# Patient Record
Sex: MALE | Race: WHITE | URBAN - METROPOLITAN AREA
[De-identification: names, ages, dates, MRNs, and addresses within clinical notes are randomized per-mention and may not be internally consistent; named-entity substitution may affect disease eponyms.]

---

## 2017-01-27 ENCOUNTER — INPATIENT (INPATIENT)
Facility: HOSPITAL | Age: 77
LOS: 2 days | Discharge: HOME | End: 2017-01-30
Attending: INTERNAL MEDICINE | Admitting: INTERNAL MEDICINE
Payer: MEDICARE

## 2017-01-27 PROCEDURE — 71010: CPT | Mod: 26

## 2017-01-27 PROCEDURE — 93010 ELECTROCARDIOGRAM REPORT: CPT

## 2017-01-27 PROCEDURE — 99285 EMERGENCY DEPT VISIT HI MDM: CPT | Mod: 25

## 2017-01-28 VITALS — WEIGHT: 224.87 LBS | HEIGHT: 70 IN

## 2017-01-28 LAB
ANION GAP SERPL CALC-SCNC: 6 MMOL/L — SIGNIFICANT CHANGE UP (ref 5–17)
BASOPHILS # BLD AUTO: 0 K/UL — SIGNIFICANT CHANGE UP (ref 0–0.2)
BASOPHILS NFR BLD AUTO: 0.4 % — SIGNIFICANT CHANGE UP (ref 0–2)
BUN SERPL-MCNC: 19 MG/DL — SIGNIFICANT CHANGE UP (ref 7–23)
CALCIUM SERPL-MCNC: 8.3 MG/DL — LOW (ref 8.5–10.1)
CHLORIDE SERPL-SCNC: 107 MMOL/L — SIGNIFICANT CHANGE UP (ref 96–108)
CO2 SERPL-SCNC: 29 MMOL/L — SIGNIFICANT CHANGE UP (ref 22–31)
CREAT SERPL-MCNC: 0.95 MG/DL — SIGNIFICANT CHANGE UP (ref 0.5–1.3)
CULTURE RESULTS: NO GROWTH — SIGNIFICANT CHANGE UP
EOSINOPHIL # BLD AUTO: 0 K/UL — SIGNIFICANT CHANGE UP (ref 0–0.5)
EOSINOPHIL NFR BLD AUTO: 0.5 % — SIGNIFICANT CHANGE UP (ref 0–6)
GLUCOSE SERPL-MCNC: 114 MG/DL — HIGH (ref 70–99)
HCT VFR BLD CALC: 35.8 % — LOW (ref 39–50)
HGB BLD-MCNC: 11.6 G/DL — LOW (ref 13–17)
LYMPHOCYTES # BLD AUTO: 1.6 K/UL — SIGNIFICANT CHANGE UP (ref 1–3.3)
LYMPHOCYTES # BLD AUTO: 16.1 % — SIGNIFICANT CHANGE UP (ref 13–44)
MAGNESIUM SERPL-MCNC: 2.2 MG/DL — SIGNIFICANT CHANGE UP (ref 1.8–2.4)
MCHC RBC-ENTMCNC: 31 PG — SIGNIFICANT CHANGE UP (ref 27–34)
MCHC RBC-ENTMCNC: 32.3 GM/DL — SIGNIFICANT CHANGE UP (ref 32–36)
MCV RBC AUTO: 95.9 FL — SIGNIFICANT CHANGE UP (ref 80–100)
MONOCYTES # BLD AUTO: 0.4 K/UL — SIGNIFICANT CHANGE UP (ref 0–0.9)
MONOCYTES NFR BLD AUTO: 4.5 % — SIGNIFICANT CHANGE UP (ref 2–14)
NEUTROPHILS # BLD AUTO: 7.7 K/UL — HIGH (ref 1.8–7.4)
NEUTROPHILS NFR BLD AUTO: 78.5 % — HIGH (ref 43–77)
PHOSPHATE SERPL-MCNC: 2.1 MG/DL — LOW (ref 2.5–4.5)
PLATELET # BLD AUTO: 177 K/UL — SIGNIFICANT CHANGE UP (ref 150–400)
POTASSIUM SERPL-MCNC: 4.3 MMOL/L — SIGNIFICANT CHANGE UP (ref 3.5–5.3)
POTASSIUM SERPL-SCNC: 4.3 MMOL/L — SIGNIFICANT CHANGE UP (ref 3.5–5.3)
RBC # BLD: 3.74 M/UL — LOW (ref 4.2–5.8)
RBC # FLD: 13.5 % — SIGNIFICANT CHANGE UP (ref 10.3–14.5)
SODIUM SERPL-SCNC: 142 MMOL/L — SIGNIFICANT CHANGE UP (ref 135–145)
SPECIMEN SOURCE: SIGNIFICANT CHANGE UP
WBC # BLD: 9.9 K/UL — SIGNIFICANT CHANGE UP (ref 3.8–10.5)
WBC # FLD AUTO: 9.9 K/UL — SIGNIFICANT CHANGE UP (ref 3.8–10.5)

## 2017-01-28 RX ORDER — TIOTROPIUM BROMIDE 18 UG/1
1 CAPSULE ORAL; RESPIRATORY (INHALATION) DAILY
Qty: 0 | Refills: 0 | Status: DISCONTINUED | OUTPATIENT
Start: 2017-01-28 | End: 2017-01-28

## 2017-01-28 RX ORDER — IPRATROPIUM/ALBUTEROL SULFATE 18-103MCG
3 AEROSOL WITH ADAPTER (GRAM) INHALATION EVERY 6 HOURS
Qty: 0 | Refills: 0 | Status: DISCONTINUED | OUTPATIENT
Start: 2017-01-28 | End: 2017-01-30

## 2017-01-28 RX ORDER — INFLUENZA VIRUS VACCINE 15; 15; 15; 15 UG/.5ML; UG/.5ML; UG/.5ML; UG/.5ML
0.5 SUSPENSION INTRAMUSCULAR ONCE
Qty: 0 | Refills: 0 | Status: DISCONTINUED | OUTPATIENT
Start: 2017-01-28 | End: 2017-01-30

## 2017-01-28 RX ORDER — CEFTRIAXONE 500 MG/1
1 INJECTION, POWDER, FOR SOLUTION INTRAMUSCULAR; INTRAVENOUS EVERY 24 HOURS
Qty: 0 | Refills: 0 | Status: DISCONTINUED | OUTPATIENT
Start: 2017-01-28 | End: 2017-01-28

## 2017-01-28 RX ORDER — ALBUTEROL 90 UG/1
1 AEROSOL, METERED ORAL EVERY 4 HOURS
Qty: 0 | Refills: 0 | Status: DISCONTINUED | OUTPATIENT
Start: 2017-01-28 | End: 2017-01-28

## 2017-01-28 RX ORDER — ACETAMINOPHEN 500 MG
650 TABLET ORAL EVERY 6 HOURS
Qty: 0 | Refills: 0 | Status: DISCONTINUED | OUTPATIENT
Start: 2017-01-28 | End: 2017-01-30

## 2017-01-28 RX ORDER — SODIUM CHLORIDE 9 MG/ML
1000 INJECTION INTRAMUSCULAR; INTRAVENOUS; SUBCUTANEOUS
Qty: 0 | Refills: 0 | Status: DISCONTINUED | OUTPATIENT
Start: 2017-01-28 | End: 2017-01-30

## 2017-01-28 RX ORDER — DOCUSATE SODIUM 100 MG
200 CAPSULE ORAL AT BEDTIME
Qty: 0 | Refills: 0 | Status: DISCONTINUED | OUTPATIENT
Start: 2017-01-28 | End: 2017-01-30

## 2017-01-28 RX ORDER — AZITHROMYCIN 500 MG/1
250 TABLET, FILM COATED ORAL DAILY
Qty: 0 | Refills: 0 | Status: DISCONTINUED | OUTPATIENT
Start: 2017-01-29 | End: 2017-01-30

## 2017-01-28 RX ORDER — ATORVASTATIN CALCIUM 80 MG/1
20 TABLET, FILM COATED ORAL AT BEDTIME
Qty: 0 | Refills: 0 | Status: DISCONTINUED | OUTPATIENT
Start: 2017-01-28 | End: 2017-01-28

## 2017-01-28 RX ORDER — AZITHROMYCIN 500 MG/1
250 TABLET, FILM COATED ORAL EVERY 24 HOURS
Qty: 0 | Refills: 0 | Status: DISCONTINUED | OUTPATIENT
Start: 2017-01-28 | End: 2017-01-28

## 2017-01-28 RX ORDER — ATORVASTATIN CALCIUM 80 MG/1
20 TABLET, FILM COATED ORAL AT BEDTIME
Qty: 0 | Refills: 0 | Status: DISCONTINUED | OUTPATIENT
Start: 2017-01-28 | End: 2017-01-30

## 2017-01-28 RX ORDER — CEFTRIAXONE 500 MG/1
1 INJECTION, POWDER, FOR SOLUTION INTRAMUSCULAR; INTRAVENOUS EVERY 24 HOURS
Qty: 0 | Refills: 0 | Status: DISCONTINUED | OUTPATIENT
Start: 2017-01-29 | End: 2017-01-30

## 2017-01-28 RX ORDER — ZOLPIDEM TARTRATE 10 MG/1
5 TABLET ORAL AT BEDTIME
Qty: 0 | Refills: 0 | Status: DISCONTINUED | OUTPATIENT
Start: 2017-01-28 | End: 2017-01-30

## 2017-01-28 RX ORDER — HEPARIN SODIUM 5000 [USP'U]/ML
5000 INJECTION INTRAVENOUS; SUBCUTANEOUS EVERY 12 HOURS
Qty: 0 | Refills: 0 | Status: DISCONTINUED | OUTPATIENT
Start: 2017-01-28 | End: 2017-01-30

## 2017-01-28 RX ORDER — AMLODIPINE BESYLATE 2.5 MG/1
5 TABLET ORAL DAILY
Qty: 0 | Refills: 0 | Status: DISCONTINUED | OUTPATIENT
Start: 2017-01-28 | End: 2017-01-28

## 2017-01-28 RX ORDER — LOSARTAN POTASSIUM 100 MG/1
100 TABLET, FILM COATED ORAL DAILY
Qty: 0 | Refills: 0 | Status: DISCONTINUED | OUTPATIENT
Start: 2017-01-28 | End: 2017-01-28

## 2017-01-28 RX ORDER — IPRATROPIUM/ALBUTEROL SULFATE 18-103MCG
3 AEROSOL WITH ADAPTER (GRAM) INHALATION EVERY 6 HOURS
Qty: 0 | Refills: 0 | Status: DISCONTINUED | OUTPATIENT
Start: 2017-01-28 | End: 2017-01-28

## 2017-01-28 RX ORDER — CEFTRIAXONE 500 MG/1
1 INJECTION, POWDER, FOR SOLUTION INTRAMUSCULAR; INTRAVENOUS EVERY 12 HOURS
Qty: 0 | Refills: 0 | Status: DISCONTINUED | OUTPATIENT
Start: 2017-01-28 | End: 2017-01-28

## 2017-01-28 RX ORDER — METFORMIN HYDROCHLORIDE 850 MG/1
1000 TABLET ORAL AT BEDTIME
Qty: 0 | Refills: 0 | Status: DISCONTINUED | OUTPATIENT
Start: 2017-01-28 | End: 2017-01-28

## 2017-01-28 RX ORDER — SENNA PLUS 8.6 MG/1
2 TABLET ORAL AT BEDTIME
Qty: 0 | Refills: 0 | Status: DISCONTINUED | OUTPATIENT
Start: 2017-01-28 | End: 2017-01-30

## 2017-01-28 RX ADMIN — SODIUM CHLORIDE 125 MILLILITER(S): 9 INJECTION INTRAMUSCULAR; INTRAVENOUS; SUBCUTANEOUS at 15:33

## 2017-01-28 RX ADMIN — ZOLPIDEM TARTRATE 5 MILLIGRAM(S): 10 TABLET ORAL at 21:02

## 2017-01-28 RX ADMIN — AZITHROMYCIN 250 MILLIGRAM(S): 500 TABLET, FILM COATED ORAL at 15:28

## 2017-01-28 RX ADMIN — HEPARIN SODIUM 5000 UNIT(S): 5000 INJECTION INTRAVENOUS; SUBCUTANEOUS at 17:56

## 2017-01-28 RX ADMIN — Medication 600 MILLIGRAM(S): at 17:56

## 2017-01-28 RX ADMIN — CEFTRIAXONE 100 GRAM(S): 500 INJECTION, POWDER, FOR SOLUTION INTRAMUSCULAR; INTRAVENOUS at 15:29

## 2017-01-28 RX ADMIN — ATORVASTATIN CALCIUM 20 MILLIGRAM(S): 80 TABLET, FILM COATED ORAL at 21:02

## 2017-01-28 NOTE — PROGRESS NOTE ADULT - SUBJECTIVE AND OBJECTIVE BOX
PCP    Patient is a 76y old  Male who presents with a chief complaint of sob and cough.    HPI: 75 y/o male with HTN, NIDDM II that presents to the ED with a 4 day history of progressively worsening  shortness of breath and productive cough. Patient state that his wife has been sick at home with similar symptoms. He  states that these symptoms have been associated with fevers, night sweats, and chills. Patient states that his shortness  of breath worsened to the point on being unable to ambulate without cough and SOB        Review of system- Rest of the review of system are normal excpet mentioned in HPI    SUBJECTIVE & OBJECTIVE:  No new complaint. Cough and SOB improving. Discussed with patient in length regarding management and d/c plan. Patient is very anxious to go home.         MEDICATIONS  (STANDING):  amLODIPine   Tablet 5milliGRAM(s) Oral daily  hydrochlorothiazide    Capsule 12.5milliGRAM(s) Oral daily  losartan 100milliGRAM(s) Oral daily  sodium chloride 0.9%. 1000milliLiter(s) IV Continuous <Continuous>  metFORMIN 1000milliGRAM(s) Oral at bedtime  atorvastatin 20milliGRAM(s) Oral at bedtime  azithromycin   Tablet 250milliGRAM(s) Oral every 24 hours  cefTRIAXone   IVPB 1Gram(s) IV Intermittent every 24 hours  guaiFENesin ER 600milliGRAM(s) Oral two times a day  heparin  Injectable 5000Unit(s) SubCutaneous every 12 hours  influenza   Vaccine 0.5milliLiter(s) IntraMuscular once    MEDICATIONS  (PRN):  acetaminophen   Tablet. 650milliGRAM(s) Oral every 6 hours PRN Mild Pain (1 - 3)  ALBUTerol/ipratropium for Nebulization 3milliLiter(s) Nebulizer every 6 hours PRN Wheezing  aluminum hydroxide/magnesium hydroxide/simethicone Suspension 30milliLiter(s) Oral every 6 hours PRN Dyspepsia  docusate sodium 200milliGRAM(s) Oral at bedtime PRN Constipation  senna 2Tablet(s) Oral at bedtime PRN Constipation  zolpidem 5milliGRAM(s) Oral at bedtime PRN Insomnia    Height (cm): 177.8 (01-28 @ 10:59)  Weight (kg): 102 (01-28 @ 10:59)  BMI (kg/m2): 32.3 (01-28 @ 10:59)  BSA (m2): 2.19 (01-28 @ 10:59)  LABS:                        11.6   9.9   )-----------( 177      ( 28 Jan 2017 06:05 )             35.8     Vitals: as per nursing documentation:  T(C): --  HR: --  BP: --  RR: --  SpO2: --  Wt(kg): --    Physical exam  GENERAL: NAD, well-groomed, well-developed  HEAD:  Atraumatic, Normocephalic  EYES: EOMI, PERRLA, conjunctiva and sclera clear  ENMT: Moist mucous membranes  NECK: Supple, No JVD  NERVOUS SYSTEM:  Alert & Oriented X3, Motor Strength 5/5 B/L upper and lower extremities; DTRs 2+ intact and symmetric  CHEST/LUNG: Clear to auscultation bilaterally; No rales, rhonchi, wheezing, or rubs  HEART: Regular rate and rhythm; No murmurs, rubs, or gallops  ABDOMEN: Soft, Nontender, Nondistended; Bowel sounds present  EXTREMITIES:  2+ Peripheral Pulses, No clubbing, cyanosis, or edema  Skin  CNS- alert, oriented X3, non focal     28 Jan 2017 06:05    142    |  107    |  19     ----------------------------<  114    4.3     |  29     |  0.95     Ca    8.3        28 Jan 2017 06:05  Phos  2.1       28 Jan 2017 06:05  Mg     2.2       28 Jan 2017 06:05      CAPILLARY BLOOD GLUCOSE      CAPILLARY BLOOD GLUCOSE    CAPILLARY BLOOD GLUCOSE            RECENT CULTURES:      RADIOLOGY & ADDITIONAL TESTS:            amLODIPine   Tablet 5milliGRAM(s) Oral daily  hydrochlorothiazide    Capsule 12.5milliGRAM(s) Oral daily  losartan 100milliGRAM(s) Oral daily  sodium chloride 0.9%. 1000milliLiter(s) IV Continuous <Continuous>  metFORMIN 1000milliGRAM(s) Oral at bedtime  atorvastatin 20milliGRAM(s) Oral at bedtime  azithromycin   Tablet 250milliGRAM(s) Oral every 24 hours  cefTRIAXone   IVPB 1Gram(s) IV Intermittent every 24 hours  guaiFENesin ER 600milliGRAM(s) Oral two times a day  heparin  Injectable 5000Unit(s) SubCutaneous every 12 hours  acetaminophen   Tablet. 650milliGRAM(s) Oral every 6 hours PRN  ALBUTerol/ipratropium for Nebulization 3milliLiter(s) Nebulizer every 6 hours PRN  aluminum hydroxide/magnesium hydroxide/simethicone Suspension 30milliLiter(s) Oral every 6 hours PRN  docusate sodium 200milliGRAM(s) Oral at bedtime PRN  senna 2Tablet(s) Oral at bedtime PRN  zolpidem 5milliGRAM(s) Oral at bedtime PRN  influenza   Vaccine 0.5milliLiter(s) IntraMuscular once    Assessment and Plan: :: 75 y/o male with HTN, NIDDM that presents with SOB and fever concerning for  Community acquired pneumonia    #Sepsis secondary to Viral Pneumomia (Human Metapnuemo Virus) with likley superimposed Community Acquired  Bacterial Left lobar Pnuemonia  -cbc wnl, will trend  - bmp wnl  -Continue IV Rocephin/Zithromax  - Mucinex for cough- blood culultres x 2, urine legiionella, mycoplasma ab    #NIDDM  - hold all oral hypoglycemics  - HISS  - Accucheck Q AC and HS    #HTN  - hold antihypertensives until sepsis resolves.

## 2017-01-29 LAB — LEGIONELLA AG UR QL: NEGATIVE — SIGNIFICANT CHANGE UP

## 2017-01-29 RX ORDER — ALBUTEROL 90 UG/1
1 AEROSOL, METERED ORAL EVERY 4 HOURS
Qty: 0 | Refills: 0 | Status: DISCONTINUED | OUTPATIENT
Start: 2017-01-29 | End: 2017-01-30

## 2017-01-29 RX ORDER — IPRATROPIUM/ALBUTEROL SULFATE 18-103MCG
3 AEROSOL WITH ADAPTER (GRAM) INHALATION EVERY 6 HOURS
Qty: 0 | Refills: 0 | Status: DISCONTINUED | OUTPATIENT
Start: 2017-01-29 | End: 2017-01-30

## 2017-01-29 RX ORDER — AMLODIPINE BESYLATE 2.5 MG/1
5 TABLET ORAL DAILY
Qty: 0 | Refills: 0 | Status: DISCONTINUED | OUTPATIENT
Start: 2017-01-29 | End: 2017-01-30

## 2017-01-29 RX ORDER — LOSARTAN POTASSIUM 100 MG/1
100 TABLET, FILM COATED ORAL DAILY
Qty: 0 | Refills: 0 | Status: DISCONTINUED | OUTPATIENT
Start: 2017-01-29 | End: 2017-01-30

## 2017-01-29 RX ORDER — TIOTROPIUM BROMIDE 18 UG/1
1 CAPSULE ORAL; RESPIRATORY (INHALATION) DAILY
Qty: 0 | Refills: 0 | Status: DISCONTINUED | OUTPATIENT
Start: 2017-01-29 | End: 2017-01-30

## 2017-01-29 RX ADMIN — Medication 3 MILLILITER(S): at 13:28

## 2017-01-29 RX ADMIN — Medication 600 MILLIGRAM(S): at 18:47

## 2017-01-29 RX ADMIN — AMLODIPINE BESYLATE 5 MILLIGRAM(S): 2.5 TABLET ORAL at 18:46

## 2017-01-29 RX ADMIN — HEPARIN SODIUM 5000 UNIT(S): 5000 INJECTION INTRAVENOUS; SUBCUTANEOUS at 06:45

## 2017-01-29 RX ADMIN — ATORVASTATIN CALCIUM 20 MILLIGRAM(S): 80 TABLET, FILM COATED ORAL at 21:47

## 2017-01-29 RX ADMIN — LOSARTAN POTASSIUM 100 MILLIGRAM(S): 100 TABLET, FILM COATED ORAL at 14:21

## 2017-01-29 RX ADMIN — Medication 200 MILLIGRAM(S): at 21:51

## 2017-01-29 RX ADMIN — ZOLPIDEM TARTRATE 5 MILLIGRAM(S): 10 TABLET ORAL at 21:47

## 2017-01-29 RX ADMIN — Medication 200 MILLIGRAM(S): at 22:41

## 2017-01-29 RX ADMIN — Medication 600 MILLIGRAM(S): at 06:45

## 2017-01-29 RX ADMIN — CEFTRIAXONE 100 GRAM(S): 500 INJECTION, POWDER, FOR SOLUTION INTRAMUSCULAR; INTRAVENOUS at 14:21

## 2017-01-29 RX ADMIN — AZITHROMYCIN 250 MILLIGRAM(S): 500 TABLET, FILM COATED ORAL at 11:46

## 2017-01-29 RX ADMIN — HEPARIN SODIUM 5000 UNIT(S): 5000 INJECTION INTRAVENOUS; SUBCUTANEOUS at 18:48

## 2017-01-29 NOTE — PROGRESS NOTE ADULT - SUBJECTIVE AND OBJECTIVE BOX
PCP    Patient is a 76y old  Male who presents with a chief complaint of sob and cough.    HPI: 75 y/o male with HTN, NIDDM II that presents to the ED with a 4 day history of progressively worsening  shortness of breath and productive cough. Patient state that his wife has been sick at home with similar symptoms. He  states that these symptoms have been associated with fevers, night sweats, and chills. Patient states that his shortness  of breath worsened to the point on being unable to ambulate without cough and SOB        Review of system- Rest of the review of system are normal excpet mentioned in HPI    SUBJECTIVE & OBJECTIVE:  No new complaint. Still with cough. Anxious to go home. Patient was advised not to be discharged until clinically stable.         MEDICATIONS  (STANDING):  amLODIPine   Tablet 5milliGRAM(s) Oral daily  hydrochlorothiazide    Capsule 12.5milliGRAM(s) Oral daily  losartan 100milliGRAM(s) Oral daily  sodium chloride 0.9%. 1000milliLiter(s) IV Continuous <Continuous>  metFORMIN 1000milliGRAM(s) Oral at bedtime  atorvastatin 20milliGRAM(s) Oral at bedtime  azithromycin   Tablet 250milliGRAM(s) Oral every 24 hours  cefTRIAXone   IVPB 1Gram(s) IV Intermittent every 24 hours  guaiFENesin ER 600milliGRAM(s) Oral two times a day  heparin  Injectable 5000Unit(s) SubCutaneous every 12 hours  influenza   Vaccine 0.5milliLiter(s) IntraMuscular once    MEDICATIONS  (PRN):  acetaminophen   Tablet. 650milliGRAM(s) Oral every 6 hours PRN Mild Pain (1 - 3)  ALBUTerol/ipratropium for Nebulization 3milliLiter(s) Nebulizer every 6 hours PRN Wheezing  aluminum hydroxide/magnesium hydroxide/simethicone Suspension 30milliLiter(s) Oral every 6 hours PRN Dyspepsia  docusate sodium 200milliGRAM(s) Oral at bedtime PRN Constipation  senna 2Tablet(s) Oral at bedtime PRN Constipation  zolpidem 5milliGRAM(s) Oral at bedtime PRN Insomnia    Height (cm): 177.8 (01-28 @ 10:59)  Weight (kg): 102 (01-28 @ 10:59)  BMI (kg/m2): 32.3 (01-28 @ 10:59)  BSA (m2): 2.19 (01-28 @ 10:59)  LABS:                        11.6   9.9   )-----------( 177      ( 28 Jan 2017 06:05 )             35.8       Physical exam  GENERAL: NAD, well-groomed, well-developed  HEAD:  Atraumatic, Normocephalic  EYES: EOMI, PERRLA, conjunctiva and sclera clear  ENMT: Moist mucous membranes  NECK: Supple, No JVD  NERVOUS SYSTEM:  Alert & Oriented X3, Motor Strength 5/5 B/L upper and lower extremities; DTRs 2+ intact and symmetric  CHEST/LUNG: Bilateral rhonchi and rales  HEART: Regular rate and rhythm; No murmurs, rubs, or gallops  ABDOMEN: Soft, Nontender, Nondistended; Bowel sounds present  EXTREMITIES:  2+ Peripheral Pulses, No clubbing, cyanosis, or edema  Skin  CNS- alert, oriented X3, non focal     28 Jan 2017 06:05    142    |  107    |  19     ----------------------------<  114    4.3     |  29     |  0.95     Ca    8.3        28 Jan 2017 06:05  Phos  2.1       28 Jan 2017 06:05  Mg     2.2       28 Jan 2017 06:05      Meds:    amLODIPine   Tablet 5milliGRAM(s) Oral daily  hydrochlorothiazide    Capsule 12.5milliGRAM(s) Oral daily  losartan 100milliGRAM(s) Oral daily  sodium chloride 0.9%. 1000milliLiter(s) IV Continuous <Continuous>  metFORMIN 1000milliGRAM(s) Oral at bedtime  atorvastatin 20milliGRAM(s) Oral at bedtime  azithromycin   Tablet 250milliGRAM(s) Oral every 24 hours  cefTRIAXone   IVPB 1Gram(s) IV Intermittent every 24 hours  guaiFENesin ER 600milliGRAM(s) Oral two times a day  heparin  Injectable 5000Unit(s) SubCutaneous every 12 hours  acetaminophen   Tablet. 650milliGRAM(s) Oral every 6 hours PRN  ALBUTerol/ipratropium for Nebulization 3milliLiter(s) Nebulizer every 6 hours PRN  aluminum hydroxide/magnesium hydroxide/simethicone Suspension 30milliLiter(s) Oral every 6 hours PRN  docusate sodium 200milliGRAM(s) Oral at bedtime PRN  senna 2Tablet(s) Oral at bedtime PRN  zolpidem 5milliGRAM(s) Oral at bedtime PRN  influenza   Vaccine 0.5milliLiter(s) IntraMuscular once    Assessment and Plan: :: 75 y/o male with HTN, NIDDM that presents with SOB and fever concerning for  Community acquired pneumonia    #Sepsis secondary to Viral Pneumomia (Human Metapnuemo Virus) with likley superimposed Community Acquired  Bacterial Left lobar Pnuemonia: resolving  -Clinically improving  - bmp wnl  -Continue IV Rocephin/Zithromax  - Mucinex for cough- blood culultres x 2, urine legiionella, mycoplasma ab    #NIDDM  - hold all oral hypoglycemics  - HISS  - Accucheck Q AC and HS    #HTN:  Restart po cozzar and  norvasc.    # Disposition: patient is not clinically stable for discharge.   - e.

## 2017-01-29 NOTE — DISCHARGE NOTE ADULT - CARE PLAN
Principal Discharge DX:	Pneumonia  Goal:	Continue antibiotic  Instructions for follow-up, activity and diet:	Follow up with your PMD in 1 week time

## 2017-01-29 NOTE — DISCHARGE NOTE ADULT - NS MD DC FALL RISK RISK
For information on Fall & Injury Prevention, visit [email or web address]

## 2017-01-29 NOTE — DISCHARGE NOTE ADULT - HOSPITAL COURSE
Patient was admitted with pneumonia and flu. He was treated with IV ropcehin and po zithromax and also nebulization treatment. Patient is clinically much improved.

## 2017-01-29 NOTE — DISCHARGE NOTE ADULT - MEDICATION SUMMARY - MEDICATIONS TO TAKE
I will START or STAY ON the medications listed below when I get home from the hospital:    Cozaar 100 mg oral tablet  -- 1 tab(s) by mouth once a day  -- Indication: For HTN    metFORMIN 500 mg oral tablet, extended release  -- 2 tab(s) by mouth 2 times a day (with meals)  -- Indication: For DM    Lipitor 20 mg oral tablet  -- 1 tab(s) by mouth once a day (at bedtime)  -- Indication: For Hyperlipidemia    albuterol 90 mcg/inh inhalation aerosol  -- 1 puff(s) inhaled every 6 hours, As Needed -for bronchospasm  -- Indication: For Pneumonia    Norvasc 5 mg oral tablet  -- 1 tab(s) by mouth once a day  -- Indication: For HTN    Ceftin 250 mg oral tablet  -- 1 tab(s) by mouth 2 times a day  -- Finish all this medication unless otherwise directed by prescriber.  Medication should be taken with plenty of water.  Take with food or milk.    -- Indication: For Pneumonia    hydroCHLOROthiazide 12.5 mg oral capsule  -- 1 cap(s) by mouth once a day  -- Indication: For HTN

## 2017-01-29 NOTE — DISCHARGE NOTE ADULT - PATIENT PORTAL LINK FT
“You can access the [employer] Patient Portal, offered by [facility], by registering with the following website: [email or web address]”

## 2017-01-30 VITALS
OXYGEN SATURATION: 95 % | HEART RATE: 76 BPM | SYSTOLIC BLOOD PRESSURE: 165 MMHG | DIASTOLIC BLOOD PRESSURE: 77 MMHG | TEMPERATURE: 98 F | RESPIRATION RATE: 20 BRPM

## 2017-01-30 DIAGNOSIS — J18.9 PNEUMONIA, UNSPECIFIED ORGANISM: ICD-10-CM

## 2017-01-30 DIAGNOSIS — R00.0 TACHYCARDIA, UNSPECIFIED: ICD-10-CM

## 2017-01-30 LAB
M PNEUMO IGG SER IA-ACNC: 2.97 INDEX — HIGH
M PNEUMO IGG SER IA-ACNC: POSITIVE
M PNEUMO IGM SER-ACNC: 92 UNITS/ML — SIGNIFICANT CHANGE UP
MYCOPLASMA AG SPEC QL: NEGATIVE — SIGNIFICANT CHANGE UP

## 2017-01-30 RX ORDER — LOSARTAN POTASSIUM 100 MG/1
1 TABLET, FILM COATED ORAL
Qty: 0 | Refills: 0 | COMMUNITY
Start: 2017-01-30

## 2017-01-30 RX ORDER — ALBUTEROL 90 UG/1
1 AEROSOL, METERED ORAL
Qty: 1 | Refills: 0 | OUTPATIENT
Start: 2017-01-30

## 2017-01-30 RX ORDER — CEFUROXIME AXETIL 250 MG
1 TABLET ORAL
Qty: 14 | Refills: 0 | OUTPATIENT
Start: 2017-01-30 | End: 2017-02-06

## 2017-01-30 RX ORDER — LOSARTAN POTASSIUM 100 MG/1
1 TABLET, FILM COATED ORAL
Qty: 0 | Refills: 0 | COMMUNITY

## 2017-01-30 RX ORDER — INFLUENZA VIRUS VACCINE 15; 15; 15; 15 UG/.5ML; UG/.5ML; UG/.5ML; UG/.5ML
0.5 SUSPENSION INTRAMUSCULAR ONCE
Qty: 0 | Refills: 0 | Status: COMPLETED | OUTPATIENT
Start: 2017-01-30 | End: 2017-01-30

## 2017-01-30 RX ADMIN — AZITHROMYCIN 250 MILLIGRAM(S): 500 TABLET, FILM COATED ORAL at 11:22

## 2017-01-30 RX ADMIN — AMLODIPINE BESYLATE 5 MILLIGRAM(S): 2.5 TABLET ORAL at 05:36

## 2017-01-30 RX ADMIN — Medication 200 MILLIGRAM(S): at 11:56

## 2017-01-30 RX ADMIN — INFLUENZA VIRUS VACCINE 0.5 MILLILITER(S): 15; 15; 15; 15 SUSPENSION INTRAMUSCULAR at 11:57

## 2017-01-30 RX ADMIN — SENNA PLUS 2 TABLET(S): 8.6 TABLET ORAL at 11:56

## 2017-01-30 RX ADMIN — Medication 600 MILLIGRAM(S): at 05:36

## 2017-01-30 RX ADMIN — LOSARTAN POTASSIUM 100 MILLIGRAM(S): 100 TABLET, FILM COATED ORAL at 06:28

## 2017-01-30 RX ADMIN — Medication 3 MILLILITER(S): at 01:48

## 2017-01-30 RX ADMIN — HEPARIN SODIUM 5000 UNIT(S): 5000 INJECTION INTRAVENOUS; SUBCUTANEOUS at 05:36

## 2017-02-01 DIAGNOSIS — J11.08 INFLUENZA DUE TO UNIDENTIFIED INFLUENZA VIRUS WITH SPECIFIED PNEUMONIA: ICD-10-CM

## 2017-02-01 DIAGNOSIS — Z87.891 PERSONAL HISTORY OF NICOTINE DEPENDENCE: ICD-10-CM

## 2017-02-01 DIAGNOSIS — J12.3 HUMAN METAPNEUMOVIRUS PNEUMONIA: ICD-10-CM

## 2017-02-01 DIAGNOSIS — E11.9 TYPE 2 DIABETES MELLITUS WITHOUT COMPLICATIONS: ICD-10-CM

## 2017-02-01 DIAGNOSIS — I10 ESSENTIAL (PRIMARY) HYPERTENSION: ICD-10-CM

## 2017-02-01 DIAGNOSIS — A41.9 SEPSIS, UNSPECIFIED ORGANISM: ICD-10-CM

## 2017-02-01 LAB
CULTURE RESULTS: SIGNIFICANT CHANGE UP
CULTURE RESULTS: SIGNIFICANT CHANGE UP
SPECIMEN SOURCE: SIGNIFICANT CHANGE UP
SPECIMEN SOURCE: SIGNIFICANT CHANGE UP

## 2018-01-01 ENCOUNTER — INPATIENT (INPATIENT)
Facility: HOSPITAL | Age: 78
LOS: 5 days | End: 2018-12-19
Attending: INTERNAL MEDICINE | Admitting: INTERNAL MEDICINE
Payer: MEDICARE

## 2018-01-01 ENCOUNTER — OUTPATIENT (OUTPATIENT)
Dept: OUTPATIENT SERVICES | Facility: HOSPITAL | Age: 78
LOS: 1 days | End: 2018-01-01
Payer: MEDICARE

## 2018-01-01 ENCOUNTER — INPATIENT (INPATIENT)
Facility: HOSPITAL | Age: 78
LOS: 3 days | Discharge: ANOTHER HOSPITAL | End: 2018-10-11
Attending: INTERNAL MEDICINE | Admitting: INTERNAL MEDICINE
Payer: MEDICARE

## 2018-01-01 VITALS
RESPIRATION RATE: 19 BRPM | HEIGHT: 68 IN | TEMPERATURE: 99 F | HEART RATE: 108 BPM | DIASTOLIC BLOOD PRESSURE: 82 MMHG | SYSTOLIC BLOOD PRESSURE: 96 MMHG | OXYGEN SATURATION: 96 % | WEIGHT: 182.1 LBS

## 2018-01-01 VITALS — SYSTOLIC BLOOD PRESSURE: 85 MMHG | DIASTOLIC BLOOD PRESSURE: 45 MMHG | HEART RATE: 81 BPM

## 2018-01-01 VITALS
DIASTOLIC BLOOD PRESSURE: 91 MMHG | TEMPERATURE: 98 F | SYSTOLIC BLOOD PRESSURE: 118 MMHG | RESPIRATION RATE: 18 BRPM | HEART RATE: 61 BPM | OXYGEN SATURATION: 96 %

## 2018-01-01 VITALS
OXYGEN SATURATION: 96 % | RESPIRATION RATE: 18 BRPM | TEMPERATURE: 98 F | DIASTOLIC BLOOD PRESSURE: 73 MMHG | SYSTOLIC BLOOD PRESSURE: 129 MMHG | HEART RATE: 83 BPM

## 2018-01-01 DIAGNOSIS — E78.5 HYPERLIPIDEMIA, UNSPECIFIED: ICD-10-CM

## 2018-01-01 DIAGNOSIS — R91.8 OTHER NONSPECIFIC ABNORMAL FINDING OF LUNG FIELD: ICD-10-CM

## 2018-01-01 DIAGNOSIS — Z66 DO NOT RESUSCITATE: ICD-10-CM

## 2018-01-01 DIAGNOSIS — I10 ESSENTIAL (PRIMARY) HYPERTENSION: ICD-10-CM

## 2018-01-01 DIAGNOSIS — C78.00 SECONDARY MALIGNANT NEOPLASM OF UNSPECIFIED LUNG: ICD-10-CM

## 2018-01-01 DIAGNOSIS — J96.01 ACUTE RESPIRATORY FAILURE WITH HYPOXIA: ICD-10-CM

## 2018-01-01 DIAGNOSIS — Z79.84 LONG TERM (CURRENT) USE OF ORAL HYPOGLYCEMIC DRUGS: ICD-10-CM

## 2018-01-01 DIAGNOSIS — D72.829 ELEVATED WHITE BLOOD CELL COUNT, UNSPECIFIED: ICD-10-CM

## 2018-01-01 DIAGNOSIS — R09.89 OTHER SPECIFIED SYMPTOMS AND SIGNS INVOLVING THE CIRCULATORY AND RESPIRATORY SYSTEMS: ICD-10-CM

## 2018-01-01 DIAGNOSIS — D61.810 ANTINEOPLASTIC CHEMOTHERAPY INDUCED PANCYTOPENIA: ICD-10-CM
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
## 2018-01-01 DIAGNOSIS — Z98.890 OTHER SPECIFIED POSTPROCEDURAL STATES: Chronic | ICD-10-CM

## 2018-01-01 DIAGNOSIS — Z87.891 PERSONAL HISTORY OF NICOTINE DEPENDENCE: ICD-10-CM

## 2018-01-01 DIAGNOSIS — Z92.3 PERSONAL HISTORY OF IRRADIATION: ICD-10-CM

## 2018-01-01 DIAGNOSIS — E11.65 TYPE 2 DIABETES MELLITUS WITH HYPERGLYCEMIA: ICD-10-CM

## 2018-01-01 DIAGNOSIS — R55 SYNCOPE AND COLLAPSE: ICD-10-CM

## 2018-01-01 DIAGNOSIS — E11.9 TYPE 2 DIABETES MELLITUS WITHOUT COMPLICATIONS: ICD-10-CM

## 2018-01-01 DIAGNOSIS — M54.2 CERVICALGIA: ICD-10-CM

## 2018-01-01 DIAGNOSIS — R57.0 CARDIOGENIC SHOCK: ICD-10-CM

## 2018-01-01 DIAGNOSIS — M89.8X8 OTHER SPECIFIED DISORDERS OF BONE, OTHER SITE: ICD-10-CM

## 2018-01-01 DIAGNOSIS — T38.0X5A ADVERSE EFFECT OF GLUCOCORTICOIDS AND SYNTHETIC ANALOGUES, INITIAL ENCOUNTER: ICD-10-CM

## 2018-01-01 DIAGNOSIS — Z00.00 ENCOUNTER FOR GENERAL ADULT MEDICAL EXAMINATION WITHOUT ABNORMAL FINDINGS: ICD-10-CM

## 2018-01-01 DIAGNOSIS — C79.51 SECONDARY MALIGNANT NEOPLASM OF BONE: ICD-10-CM

## 2018-01-01 DIAGNOSIS — E86.0 DEHYDRATION: ICD-10-CM

## 2018-01-01 DIAGNOSIS — D68.59 OTHER PRIMARY THROMBOPHILIA: ICD-10-CM

## 2018-01-01 DIAGNOSIS — G95.89 OTHER SPECIFIED DISEASES OF SPINAL CORD: ICD-10-CM

## 2018-01-01 DIAGNOSIS — E44.0 MODERATE PROTEIN-CALORIE MALNUTRITION: ICD-10-CM

## 2018-01-01 DIAGNOSIS — K62.5 HEMORRHAGE OF ANUS AND RECTUM: ICD-10-CM

## 2018-01-01 DIAGNOSIS — E66.9 OBESITY, UNSPECIFIED: ICD-10-CM

## 2018-01-01 DIAGNOSIS — M96.842 POSTPROCEDURAL SEROMA OF A MUSCULOSKELETAL STRUCTURE FOLLOWING A MUSCULOSKELETAL SYSTEM PROCEDURE: ICD-10-CM

## 2018-01-01 DIAGNOSIS — C34.90 MALIGNANT NEOPLASM OF UNSPECIFIED PART OF UNSPECIFIED BRONCHUS OR LUNG: ICD-10-CM

## 2018-01-01 DIAGNOSIS — Z92.21 PERSONAL HISTORY OF ANTINEOPLASTIC CHEMOTHERAPY: ICD-10-CM
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
## 2018-01-01 DIAGNOSIS — M50.30 OTHER CERVICAL DISC DEGENERATION, UNSPECIFIED CERVICAL REGION: ICD-10-CM

## 2018-01-01 DIAGNOSIS — F41.9 ANXIETY DISORDER, UNSPECIFIED: ICD-10-CM

## 2018-01-01 DIAGNOSIS — R19.7 DIARRHEA, UNSPECIFIED: ICD-10-CM

## 2018-01-01 DIAGNOSIS — D72.819 DECREASED WHITE BLOOD CELL COUNT, UNSPECIFIED: ICD-10-CM

## 2018-01-01 DIAGNOSIS — G82.50 QUADRIPLEGIA, UNSPECIFIED: ICD-10-CM

## 2018-01-01 DIAGNOSIS — K64.9 UNSPECIFIED HEMORRHOIDS: ICD-10-CM

## 2018-01-01 DIAGNOSIS — K21.9 GASTRO-ESOPHAGEAL REFLUX DISEASE WITHOUT ESOPHAGITIS: ICD-10-CM

## 2018-01-01 DIAGNOSIS — T81.89XA OTHER COMPLICATIONS OF PROCEDURES, NOT ELSEWHERE CLASSIFIED, INITIAL ENCOUNTER: ICD-10-CM

## 2018-01-01 DIAGNOSIS — D64.9 ANEMIA, UNSPECIFIED: ICD-10-CM

## 2018-01-01 LAB
ABO RH CONFIRMATION: SIGNIFICANT CHANGE UP
ADD ON TEST-SPECIMEN IN LAB: SIGNIFICANT CHANGE UP
ALBUMIN SERPL ELPH-MCNC: 2.2 G/DL — LOW (ref 3.3–5)
ALBUMIN SERPL ELPH-MCNC: 2.4 G/DL — LOW (ref 3.3–5)
ALBUMIN SERPL ELPH-MCNC: 3.2 G/DL — LOW (ref 3.3–5)
ALP SERPL-CCNC: 91 U/L — SIGNIFICANT CHANGE UP (ref 40–120)
ALP SERPL-CCNC: 92 U/L — SIGNIFICANT CHANGE UP (ref 40–120)
ALP SERPL-CCNC: 96 U/L — SIGNIFICANT CHANGE UP (ref 40–120)
ALT FLD-CCNC: 18 U/L — SIGNIFICANT CHANGE UP (ref 12–78)
ALT FLD-CCNC: 19 U/L — SIGNIFICANT CHANGE UP (ref 12–78)
ALT FLD-CCNC: 20 U/L — SIGNIFICANT CHANGE UP (ref 12–78)
ANION GAP SERPL CALC-SCNC: 10 MMOL/L — SIGNIFICANT CHANGE UP (ref 5–17)
ANION GAP SERPL CALC-SCNC: 12 MMOL/L — SIGNIFICANT CHANGE UP (ref 5–17)
ANION GAP SERPL CALC-SCNC: 6 MMOL/L — SIGNIFICANT CHANGE UP (ref 5–17)
ANION GAP SERPL CALC-SCNC: 7 MMOL/L — SIGNIFICANT CHANGE UP (ref 5–17)
ANION GAP SERPL CALC-SCNC: 8 MMOL/L — SIGNIFICANT CHANGE UP (ref 5–17)
APPEARANCE UR: CLEAR — SIGNIFICANT CHANGE UP
APTT BLD: 25.6 SEC — LOW (ref 27.5–37.4)
APTT BLD: 25.7 SEC — LOW (ref 27.5–36.3)
APTT BLD: 25.8 SEC — LOW (ref 27.5–37.4)
AST SERPL-CCNC: 16 U/L — SIGNIFICANT CHANGE UP (ref 15–37)
AST SERPL-CCNC: 18 U/L — SIGNIFICANT CHANGE UP (ref 15–37)
AST SERPL-CCNC: 19 U/L — SIGNIFICANT CHANGE UP (ref 15–37)
BACTERIA # UR AUTO: ABNORMAL
BASE EXCESS BLDA CALC-SCNC: -3.1 MMOL/L — LOW (ref -2–2)
BASOPHILS # BLD AUTO: 0.01 K/UL — SIGNIFICANT CHANGE UP (ref 0–0.2)
BASOPHILS # BLD AUTO: 0.04 K/UL — SIGNIFICANT CHANGE UP (ref 0–0.2)
BASOPHILS NFR BLD AUTO: 0.1 % — SIGNIFICANT CHANGE UP (ref 0–2)
BASOPHILS NFR BLD AUTO: 0.3 % — SIGNIFICANT CHANGE UP (ref 0–2)
BILIRUB DIRECT SERPL-MCNC: 0.3 MG/DL — HIGH (ref 0–0.2)
BILIRUB INDIRECT FLD-MCNC: 0.3 MG/DL — SIGNIFICANT CHANGE UP (ref 0.2–1)
BILIRUB SERPL-MCNC: 0.5 MG/DL — SIGNIFICANT CHANGE UP (ref 0.2–1.2)
BILIRUB SERPL-MCNC: 0.6 MG/DL — SIGNIFICANT CHANGE UP (ref 0.2–1.2)
BILIRUB SERPL-MCNC: 0.6 MG/DL — SIGNIFICANT CHANGE UP (ref 0.2–1.2)
BILIRUB UR-MCNC: NEGATIVE — SIGNIFICANT CHANGE UP
BLD GP AB SCN SERPL QL: SIGNIFICANT CHANGE UP
BLOOD GAS COMMENTS ARTERIAL: SIGNIFICANT CHANGE UP
BUN SERPL-MCNC: 23 MG/DL — SIGNIFICANT CHANGE UP (ref 7–23)
BUN SERPL-MCNC: 26 MG/DL — HIGH (ref 7–23)
BUN SERPL-MCNC: 33 MG/DL — HIGH (ref 7–23)
BUN SERPL-MCNC: 36 MG/DL — HIGH (ref 7–23)
BUN SERPL-MCNC: 36 MG/DL — HIGH (ref 7–23)
BUN SERPL-MCNC: 38 MG/DL — HIGH (ref 7–23)
BUN SERPL-MCNC: 42 MG/DL — HIGH (ref 7–23)
BUN SERPL-MCNC: 45 MG/DL — HIGH (ref 7–23)
BUN SERPL-MCNC: 53 MG/DL — HIGH (ref 7–23)
BUN SERPL-MCNC: 54 MG/DL — HIGH (ref 7–23)
CALCIUM SERPL-MCNC: 8.1 MG/DL — LOW (ref 8.5–10.1)
CALCIUM SERPL-MCNC: 8.2 MG/DL — LOW (ref 8.5–10.1)
CALCIUM SERPL-MCNC: 8.4 MG/DL — LOW (ref 8.5–10.1)
CALCIUM SERPL-MCNC: 8.6 MG/DL — SIGNIFICANT CHANGE UP (ref 8.5–10.1)
CALCIUM SERPL-MCNC: 8.6 MG/DL — SIGNIFICANT CHANGE UP (ref 8.5–10.1)
CALCIUM SERPL-MCNC: 8.7 MG/DL — SIGNIFICANT CHANGE UP (ref 8.5–10.1)
CALCIUM SERPL-MCNC: 8.8 MG/DL — SIGNIFICANT CHANGE UP (ref 8.5–10.1)
CALCIUM SERPL-MCNC: 8.8 MG/DL — SIGNIFICANT CHANGE UP (ref 8.5–10.1)
CALCIUM SERPL-MCNC: 9.3 MG/DL — SIGNIFICANT CHANGE UP (ref 8.5–10.1)
CALCIUM SERPL-MCNC: 9.4 MG/DL — SIGNIFICANT CHANGE UP (ref 8.5–10.1)
CEA SERPL-MCNC: 5.8 NG/ML — HIGH (ref 0–3.8)
CHLORIDE SERPL-SCNC: 101 MMOL/L — SIGNIFICANT CHANGE UP (ref 96–108)
CHLORIDE SERPL-SCNC: 105 MMOL/L — SIGNIFICANT CHANGE UP (ref 96–108)
CHLORIDE SERPL-SCNC: 106 MMOL/L — SIGNIFICANT CHANGE UP (ref 96–108)
CHLORIDE SERPL-SCNC: 106 MMOL/L — SIGNIFICANT CHANGE UP (ref 96–108)
CHLORIDE SERPL-SCNC: 109 MMOL/L — HIGH (ref 96–108)
CHLORIDE SERPL-SCNC: 109 MMOL/L — HIGH (ref 96–108)
CHLORIDE SERPL-SCNC: 113 MMOL/L — HIGH (ref 96–108)
CHLORIDE SERPL-SCNC: 113 MMOL/L — HIGH (ref 96–108)
CHLORIDE SERPL-SCNC: 117 MMOL/L — HIGH (ref 96–108)
CHLORIDE SERPL-SCNC: 118 MMOL/L — HIGH (ref 96–108)
CO2 SERPL-SCNC: 20 MMOL/L — LOW (ref 22–31)
CO2 SERPL-SCNC: 23 MMOL/L — SIGNIFICANT CHANGE UP (ref 22–31)
CO2 SERPL-SCNC: 24 MMOL/L — SIGNIFICANT CHANGE UP (ref 22–31)
CO2 SERPL-SCNC: 26 MMOL/L — SIGNIFICANT CHANGE UP (ref 22–31)
CO2 SERPL-SCNC: 26 MMOL/L — SIGNIFICANT CHANGE UP (ref 22–31)
CO2 SERPL-SCNC: 29 MMOL/L — SIGNIFICANT CHANGE UP (ref 22–31)
CO2 SERPL-SCNC: 29 MMOL/L — SIGNIFICANT CHANGE UP (ref 22–31)
COLOR SPEC: YELLOW — SIGNIFICANT CHANGE UP
COMMENT - URINE: SIGNIFICANT CHANGE UP
CORTIS AM PEAK SERPL-MCNC: 12 UG/DL — SIGNIFICANT CHANGE UP (ref 6–18.4)
CREAT SERPL-MCNC: 0.64 MG/DL — SIGNIFICANT CHANGE UP (ref 0.5–1.3)
CREAT SERPL-MCNC: 0.65 MG/DL — SIGNIFICANT CHANGE UP (ref 0.5–1.3)
CREAT SERPL-MCNC: 0.78 MG/DL — SIGNIFICANT CHANGE UP (ref 0.5–1.3)
CREAT SERPL-MCNC: 0.82 MG/DL — SIGNIFICANT CHANGE UP (ref 0.5–1.3)
CREAT SERPL-MCNC: 0.83 MG/DL — SIGNIFICANT CHANGE UP (ref 0.5–1.3)
CREAT SERPL-MCNC: 0.89 MG/DL — SIGNIFICANT CHANGE UP (ref 0.5–1.3)
CREAT SERPL-MCNC: 0.91 MG/DL — SIGNIFICANT CHANGE UP (ref 0.5–1.3)
CREAT SERPL-MCNC: 0.94 MG/DL — SIGNIFICANT CHANGE UP (ref 0.5–1.3)
CREAT SERPL-MCNC: 1.05 MG/DL — SIGNIFICANT CHANGE UP (ref 0.5–1.3)
CREAT SERPL-MCNC: 1.3 MG/DL — SIGNIFICANT CHANGE UP (ref 0.5–1.3)
CRP SERPL-MCNC: 2.57 MG/DL — HIGH (ref 0–0.4)
CULTURE RESULTS: NO GROWTH — SIGNIFICANT CHANGE UP
CULTURE RESULTS: SIGNIFICANT CHANGE UP
DIFF PNL FLD: ABNORMAL
EOSINOPHIL # BLD AUTO: 0 K/UL — SIGNIFICANT CHANGE UP (ref 0–0.5)
EOSINOPHIL # BLD AUTO: 0.11 K/UL — SIGNIFICANT CHANGE UP (ref 0–0.5)
EOSINOPHIL NFR BLD AUTO: 0 % — SIGNIFICANT CHANGE UP (ref 0–6)
EOSINOPHIL NFR BLD AUTO: 0.9 % — SIGNIFICANT CHANGE UP (ref 0–6)
EPI CELLS # UR: SIGNIFICANT CHANGE UP
ERYTHROCYTE [SEDIMENTATION RATE] IN BLOOD: 24 MM/HR — HIGH (ref 0–20)
GAS PNL BLDA: SIGNIFICANT CHANGE UP
GLUCOSE BLDC GLUCOMTR-MCNC: 101 MG/DL — HIGH (ref 70–99)
GLUCOSE BLDC GLUCOMTR-MCNC: 125 MG/DL — HIGH (ref 70–99)
GLUCOSE BLDC GLUCOMTR-MCNC: 138 MG/DL — HIGH (ref 70–99)
GLUCOSE BLDC GLUCOMTR-MCNC: 141 MG/DL — HIGH (ref 70–99)
GLUCOSE BLDC GLUCOMTR-MCNC: 145 MG/DL — HIGH (ref 70–99)
GLUCOSE BLDC GLUCOMTR-MCNC: 147 MG/DL — HIGH (ref 70–99)
GLUCOSE BLDC GLUCOMTR-MCNC: 156 MG/DL — HIGH (ref 70–99)
GLUCOSE BLDC GLUCOMTR-MCNC: 159 MG/DL — HIGH (ref 70–99)
GLUCOSE BLDC GLUCOMTR-MCNC: 160 MG/DL — HIGH (ref 70–99)
GLUCOSE BLDC GLUCOMTR-MCNC: 165 MG/DL — HIGH (ref 70–99)
GLUCOSE BLDC GLUCOMTR-MCNC: 165 MG/DL — HIGH (ref 70–99)
GLUCOSE BLDC GLUCOMTR-MCNC: 167 MG/DL — HIGH (ref 70–99)
GLUCOSE BLDC GLUCOMTR-MCNC: 171 MG/DL — HIGH (ref 70–99)
GLUCOSE BLDC GLUCOMTR-MCNC: 172 MG/DL — HIGH (ref 70–99)
GLUCOSE BLDC GLUCOMTR-MCNC: 174 MG/DL — HIGH (ref 70–99)
GLUCOSE BLDC GLUCOMTR-MCNC: 179 MG/DL — HIGH (ref 70–99)
GLUCOSE BLDC GLUCOMTR-MCNC: 186 MG/DL — HIGH (ref 70–99)
GLUCOSE BLDC GLUCOMTR-MCNC: 189 MG/DL — HIGH (ref 70–99)
GLUCOSE BLDC GLUCOMTR-MCNC: 217 MG/DL — HIGH (ref 70–99)
GLUCOSE BLDC GLUCOMTR-MCNC: 221 MG/DL — HIGH (ref 70–99)
GLUCOSE BLDC GLUCOMTR-MCNC: 222 MG/DL — HIGH (ref 70–99)
GLUCOSE BLDC GLUCOMTR-MCNC: 226 MG/DL — HIGH (ref 70–99)
GLUCOSE BLDC GLUCOMTR-MCNC: 242 MG/DL — HIGH (ref 70–99)
GLUCOSE BLDC GLUCOMTR-MCNC: 251 MG/DL — HIGH (ref 70–99)
GLUCOSE BLDC GLUCOMTR-MCNC: 267 MG/DL — HIGH (ref 70–99)
GLUCOSE BLDC GLUCOMTR-MCNC: 291 MG/DL — HIGH (ref 70–99)
GLUCOSE BLDC GLUCOMTR-MCNC: 307 MG/DL — HIGH (ref 70–99)
GLUCOSE BLDC GLUCOMTR-MCNC: 322 MG/DL — HIGH (ref 70–99)
GLUCOSE BLDC GLUCOMTR-MCNC: 363 MG/DL — HIGH (ref 70–99)
GLUCOSE BLDC GLUCOMTR-MCNC: 398 MG/DL — HIGH (ref 70–99)
GLUCOSE BLDC GLUCOMTR-MCNC: 426 MG/DL — HIGH (ref 70–99)
GLUCOSE BLDC GLUCOMTR-MCNC: 435 MG/DL — HIGH (ref 70–99)
GLUCOSE SERPL-MCNC: 112 MG/DL — HIGH (ref 70–99)
GLUCOSE SERPL-MCNC: 120 MG/DL — HIGH (ref 70–99)
GLUCOSE SERPL-MCNC: 148 MG/DL — HIGH (ref 70–99)
GLUCOSE SERPL-MCNC: 156 MG/DL — HIGH (ref 70–99)
GLUCOSE SERPL-MCNC: 166 MG/DL — HIGH (ref 70–99)
GLUCOSE SERPL-MCNC: 178 MG/DL — HIGH (ref 70–99)
GLUCOSE SERPL-MCNC: 203 MG/DL — HIGH (ref 70–99)
GLUCOSE SERPL-MCNC: 212 MG/DL — HIGH (ref 70–99)
GLUCOSE SERPL-MCNC: 316 MG/DL — HIGH (ref 70–99)
GLUCOSE SERPL-MCNC: 96 MG/DL — SIGNIFICANT CHANGE UP (ref 70–99)
GLUCOSE UR QL: NEGATIVE MG/DL — SIGNIFICANT CHANGE UP
HBA1C BLD-MCNC: 11.8 % — HIGH (ref 4–5.6)
HBA1C BLD-MCNC: 6.5 % — HIGH (ref 4–5.6)
HCO3 BLDA-SCNC: 22 MMOL/L — SIGNIFICANT CHANGE UP (ref 21–29)
HCT VFR BLD CALC: 21.9 % — LOW (ref 39–50)
HCT VFR BLD CALC: 23 % — LOW (ref 39–50)
HCT VFR BLD CALC: 23.3 % — LOW (ref 39–50)
HCT VFR BLD CALC: 25.9 % — LOW (ref 39–50)
HCT VFR BLD CALC: 28.7 % — LOW (ref 39–50)
HCT VFR BLD CALC: 32.3 % — LOW (ref 39–50)
HCT VFR BLD CALC: 32.7 % — LOW (ref 39–50)
HCT VFR BLD CALC: 33 % — LOW (ref 39–50)
HCT VFR BLD CALC: 33.9 % — LOW (ref 39–50)
HCT VFR BLD CALC: 35.2 % — LOW (ref 39–50)
HCT VFR BLD CALC: 42.7 % — SIGNIFICANT CHANGE UP (ref 39–50)
HGB BLD-MCNC: 10.2 G/DL — LOW (ref 13–17)
HGB BLD-MCNC: 10.4 G/DL — LOW (ref 13–17)
HGB BLD-MCNC: 11.1 G/DL — LOW (ref 13–17)
HGB BLD-MCNC: 11.2 G/DL — LOW (ref 13–17)
HGB BLD-MCNC: 12 G/DL — LOW (ref 13–17)
HGB BLD-MCNC: 14.2 G/DL — SIGNIFICANT CHANGE UP (ref 13–17)
HGB BLD-MCNC: 6.8 G/DL — CRITICAL LOW (ref 13–17)
HGB BLD-MCNC: 7.1 G/DL — LOW (ref 13–17)
HGB BLD-MCNC: 7.1 G/DL — LOW (ref 13–17)
HGB BLD-MCNC: 8.1 G/DL — LOW (ref 13–17)
HGB BLD-MCNC: 8.9 G/DL — LOW (ref 13–17)
IMM GRANULOCYTES NFR BLD AUTO: 1.8 % — HIGH (ref 0–1.5)
IMM GRANULOCYTES NFR BLD AUTO: 3.5 % — HIGH (ref 0–1.5)
INR BLD: 1 RATIO — SIGNIFICANT CHANGE UP (ref 0.88–1.16)
INR BLD: 1.03 RATIO — SIGNIFICANT CHANGE UP (ref 0.88–1.16)
INR BLD: 1.1 RATIO — SIGNIFICANT CHANGE UP (ref 0.88–1.16)
KETONES UR-MCNC: NEGATIVE — SIGNIFICANT CHANGE UP
LACTATE SERPL-SCNC: 0.7 MMOL/L — SIGNIFICANT CHANGE UP (ref 0.7–2)
LACTATE SERPL-SCNC: 3.5 MMOL/L — HIGH (ref 0.7–2)
LEUKOCYTE ESTERASE UR-ACNC: NEGATIVE — SIGNIFICANT CHANGE UP
LYMPHOCYTES # BLD AUTO: 0.83 K/UL — LOW (ref 1–3.3)
LYMPHOCYTES # BLD AUTO: 1.15 K/UL — SIGNIFICANT CHANGE UP (ref 1–3.3)
LYMPHOCYTES # BLD AUTO: 15.3 % — SIGNIFICANT CHANGE UP (ref 13–44)
LYMPHOCYTES # BLD AUTO: 7 % — LOW (ref 13–44)
MAGNESIUM SERPL-MCNC: 1.9 MG/DL — SIGNIFICANT CHANGE UP (ref 1.6–2.6)
MAGNESIUM SERPL-MCNC: 2 MG/DL — SIGNIFICANT CHANGE UP (ref 1.6–2.6)
MAGNESIUM SERPL-MCNC: 2 MG/DL — SIGNIFICANT CHANGE UP (ref 1.6–2.6)
MAGNESIUM SERPL-MCNC: 2.1 MG/DL — SIGNIFICANT CHANGE UP (ref 1.6–2.6)
MAGNESIUM SERPL-MCNC: 2.2 MG/DL — SIGNIFICANT CHANGE UP (ref 1.6–2.6)
MCHC RBC-ENTMCNC: 28.7 PG — SIGNIFICANT CHANGE UP (ref 27–34)
MCHC RBC-ENTMCNC: 29.3 PG — SIGNIFICANT CHANGE UP (ref 27–34)
MCHC RBC-ENTMCNC: 29.4 PG — SIGNIFICANT CHANGE UP (ref 27–34)
MCHC RBC-ENTMCNC: 29.5 PG — SIGNIFICANT CHANGE UP (ref 27–34)
MCHC RBC-ENTMCNC: 29.6 PG — SIGNIFICANT CHANGE UP (ref 27–34)
MCHC RBC-ENTMCNC: 30 PG — SIGNIFICANT CHANGE UP (ref 27–34)
MCHC RBC-ENTMCNC: 30.1 PG — SIGNIFICANT CHANGE UP (ref 27–34)
MCHC RBC-ENTMCNC: 30.5 GM/DL — LOW (ref 32–36)
MCHC RBC-ENTMCNC: 30.7 GM/DL — LOW (ref 32–36)
MCHC RBC-ENTMCNC: 30.9 GM/DL — LOW (ref 32–36)
MCHC RBC-ENTMCNC: 31 GM/DL — LOW (ref 32–36)
MCHC RBC-ENTMCNC: 31 PG — SIGNIFICANT CHANGE UP (ref 27–34)
MCHC RBC-ENTMCNC: 31.1 GM/DL — LOW (ref 32–36)
MCHC RBC-ENTMCNC: 31.1 PG — SIGNIFICANT CHANGE UP (ref 27–34)
MCHC RBC-ENTMCNC: 31.3 GM/DL — LOW (ref 32–36)
MCHC RBC-ENTMCNC: 31.4 PG — SIGNIFICANT CHANGE UP (ref 27–34)
MCHC RBC-ENTMCNC: 31.5 PG — SIGNIFICANT CHANGE UP (ref 27–34)
MCHC RBC-ENTMCNC: 31.6 GM/DL — LOW (ref 32–36)
MCHC RBC-ENTMCNC: 33.3 GM/DL — SIGNIFICANT CHANGE UP (ref 32–36)
MCHC RBC-ENTMCNC: 33.9 GM/DL — SIGNIFICANT CHANGE UP (ref 32–36)
MCHC RBC-ENTMCNC: 33.9 GM/DL — SIGNIFICANT CHANGE UP (ref 32–36)
MCHC RBC-ENTMCNC: 34.1 GM/DL — SIGNIFICANT CHANGE UP (ref 32–36)
MCV RBC AUTO: 91.2 FL — SIGNIFICANT CHANGE UP (ref 80–100)
MCV RBC AUTO: 91.4 FL — SIGNIFICANT CHANGE UP (ref 80–100)
MCV RBC AUTO: 92.6 FL — SIGNIFICANT CHANGE UP (ref 80–100)
MCV RBC AUTO: 93.1 FL — SIGNIFICANT CHANGE UP (ref 80–100)
MCV RBC AUTO: 94.4 FL — SIGNIFICANT CHANGE UP (ref 80–100)
MCV RBC AUTO: 94.5 FL — SIGNIFICANT CHANGE UP (ref 80–100)
MCV RBC AUTO: 94.7 FL — SIGNIFICANT CHANGE UP (ref 80–100)
MCV RBC AUTO: 94.8 FL — SIGNIFICANT CHANGE UP (ref 80–100)
MCV RBC AUTO: 95 FL — SIGNIFICANT CHANGE UP (ref 80–100)
MCV RBC AUTO: 96.7 FL — SIGNIFICANT CHANGE UP (ref 80–100)
MCV RBC AUTO: 98.3 FL — SIGNIFICANT CHANGE UP (ref 80–100)
MONOCYTES # BLD AUTO: 0.29 K/UL — SIGNIFICANT CHANGE UP (ref 0–0.9)
MONOCYTES # BLD AUTO: 0.75 K/UL — SIGNIFICANT CHANGE UP (ref 0–0.9)
MONOCYTES NFR BLD AUTO: 3.9 % — SIGNIFICANT CHANGE UP (ref 2–14)
MONOCYTES NFR BLD AUTO: 6.3 % — SIGNIFICANT CHANGE UP (ref 2–14)
NEUTROPHILS # BLD AUTO: 5.79 K/UL — SIGNIFICANT CHANGE UP (ref 1.8–7.4)
NEUTROPHILS # BLD AUTO: 9.98 K/UL — HIGH (ref 1.8–7.4)
NEUTROPHILS NFR BLD AUTO: 77.2 % — HIGH (ref 43–77)
NEUTROPHILS NFR BLD AUTO: 83.7 % — HIGH (ref 43–77)
NITRITE UR-MCNC: NEGATIVE — SIGNIFICANT CHANGE UP
NRBC # BLD: 0 /100 WBCS — SIGNIFICANT CHANGE UP (ref 0–0)
NT-PROBNP SERPL-SCNC: 1773 PG/ML — HIGH (ref 0–450)
PCO2 BLDA: 44 MMHG — SIGNIFICANT CHANGE UP (ref 32–46)
PH BLDA: 7.33 — LOW (ref 7.35–7.45)
PH UR: 5 — SIGNIFICANT CHANGE UP (ref 5–8)
PHOSPHATE SERPL-MCNC: 2.3 MG/DL — LOW (ref 2.5–4.5)
PHOSPHATE SERPL-MCNC: 3 MG/DL — SIGNIFICANT CHANGE UP (ref 2.5–4.5)
PHOSPHATE SERPL-MCNC: 3.2 MG/DL — SIGNIFICANT CHANGE UP (ref 2.5–4.5)
PHOSPHATE SERPL-MCNC: 3.2 MG/DL — SIGNIFICANT CHANGE UP (ref 2.5–4.5)
PHOSPHATE SERPL-MCNC: 3.4 MG/DL — SIGNIFICANT CHANGE UP (ref 2.5–4.5)
PLATELET # BLD AUTO: 100 K/UL — LOW (ref 150–400)
PLATELET # BLD AUTO: 143 K/UL — LOW (ref 150–400)
PLATELET # BLD AUTO: 199 K/UL — SIGNIFICANT CHANGE UP (ref 150–400)
PLATELET # BLD AUTO: 242 K/UL — SIGNIFICANT CHANGE UP (ref 150–400)
PLATELET # BLD AUTO: 262 K/UL — SIGNIFICANT CHANGE UP (ref 150–400)
PLATELET # BLD AUTO: 269 K/UL — SIGNIFICANT CHANGE UP (ref 150–400)
PLATELET # BLD AUTO: 286 K/UL — SIGNIFICANT CHANGE UP (ref 150–400)
PLATELET # BLD AUTO: 314 K/UL — SIGNIFICANT CHANGE UP (ref 150–400)
PLATELET # BLD AUTO: 364 K/UL — SIGNIFICANT CHANGE UP (ref 150–400)
PLATELET # BLD AUTO: 47 K/UL — LOW (ref 150–400)
PLATELET # BLD AUTO: 71 K/UL — LOW (ref 150–400)
PO2 BLDA: 165 MMHG — HIGH (ref 74–108)
POTASSIUM SERPL-MCNC: 3.6 MMOL/L — SIGNIFICANT CHANGE UP (ref 3.5–5.3)
POTASSIUM SERPL-MCNC: 3.9 MMOL/L — SIGNIFICANT CHANGE UP (ref 3.5–5.3)
POTASSIUM SERPL-MCNC: 3.9 MMOL/L — SIGNIFICANT CHANGE UP (ref 3.5–5.3)
POTASSIUM SERPL-MCNC: 4 MMOL/L — SIGNIFICANT CHANGE UP (ref 3.5–5.3)
POTASSIUM SERPL-MCNC: 4.1 MMOL/L — SIGNIFICANT CHANGE UP (ref 3.5–5.3)
POTASSIUM SERPL-MCNC: 4.2 MMOL/L — SIGNIFICANT CHANGE UP (ref 3.5–5.3)
POTASSIUM SERPL-MCNC: 4.3 MMOL/L — SIGNIFICANT CHANGE UP (ref 3.5–5.3)
POTASSIUM SERPL-MCNC: 4.5 MMOL/L — SIGNIFICANT CHANGE UP (ref 3.5–5.3)
POTASSIUM SERPL-MCNC: 4.5 MMOL/L — SIGNIFICANT CHANGE UP (ref 3.5–5.3)
POTASSIUM SERPL-MCNC: 4.6 MMOL/L — SIGNIFICANT CHANGE UP (ref 3.5–5.3)
POTASSIUM SERPL-SCNC: 3.6 MMOL/L — SIGNIFICANT CHANGE UP (ref 3.5–5.3)
POTASSIUM SERPL-SCNC: 3.9 MMOL/L — SIGNIFICANT CHANGE UP (ref 3.5–5.3)
POTASSIUM SERPL-SCNC: 3.9 MMOL/L — SIGNIFICANT CHANGE UP (ref 3.5–5.3)
POTASSIUM SERPL-SCNC: 4 MMOL/L — SIGNIFICANT CHANGE UP (ref 3.5–5.3)
POTASSIUM SERPL-SCNC: 4.1 MMOL/L — SIGNIFICANT CHANGE UP (ref 3.5–5.3)
POTASSIUM SERPL-SCNC: 4.2 MMOL/L — SIGNIFICANT CHANGE UP (ref 3.5–5.3)
POTASSIUM SERPL-SCNC: 4.3 MMOL/L — SIGNIFICANT CHANGE UP (ref 3.5–5.3)
POTASSIUM SERPL-SCNC: 4.5 MMOL/L — SIGNIFICANT CHANGE UP (ref 3.5–5.3)
POTASSIUM SERPL-SCNC: 4.5 MMOL/L — SIGNIFICANT CHANGE UP (ref 3.5–5.3)
POTASSIUM SERPL-SCNC: 4.6 MMOL/L — SIGNIFICANT CHANGE UP (ref 3.5–5.3)
PROT SERPL-MCNC: 6.3 GM/DL — SIGNIFICANT CHANGE UP (ref 6–8.3)
PROT SERPL-MCNC: 6.6 GM/DL — SIGNIFICANT CHANGE UP (ref 6–8.3)
PROT SERPL-MCNC: 6.7 GM/DL — SIGNIFICANT CHANGE UP (ref 6–8.3)
PROT UR-MCNC: 30 MG/DL
PROTHROM AB SERPL-ACNC: 10.8 SEC — SIGNIFICANT CHANGE UP (ref 9.8–12.7)
PROTHROM AB SERPL-ACNC: 11.1 SEC — SIGNIFICANT CHANGE UP (ref 9.8–12.7)
PROTHROM AB SERPL-ACNC: 12.2 SEC — SIGNIFICANT CHANGE UP (ref 10–12.9)
RBC # BLD: 2.31 M/UL — LOW (ref 4.2–5.8)
RBC # BLD: 2.41 M/UL — LOW (ref 4.2–5.8)
RBC # BLD: 2.47 M/UL — LOW (ref 4.2–5.8)
RBC # BLD: 2.74 M/UL — LOW (ref 4.2–5.8)
RBC # BLD: 3.04 M/UL — LOW (ref 4.2–5.8)
RBC # BLD: 3.4 M/UL — LOW (ref 4.2–5.8)
RBC # BLD: 3.45 M/UL — LOW (ref 4.2–5.8)
RBC # BLD: 3.53 M/UL — LOW (ref 4.2–5.8)
RBC # BLD: 3.61 M/UL — LOW (ref 4.2–5.8)
RBC # BLD: 3.86 M/UL — LOW (ref 4.2–5.8)
RBC # BLD: 4.51 M/UL — SIGNIFICANT CHANGE UP (ref 4.2–5.8)
RBC # FLD: 13.2 % — SIGNIFICANT CHANGE UP (ref 10.3–14.5)
RBC # FLD: 14.7 % — HIGH (ref 10.3–14.5)
RBC # FLD: 14.8 % — HIGH (ref 10.3–14.5)
RBC # FLD: 14.9 % — HIGH (ref 10.3–14.5)
RBC # FLD: 15.1 % — HIGH (ref 10.3–14.5)
RBC # FLD: 15.1 % — HIGH (ref 10.3–14.5)
RBC # FLD: 15.2 % — HIGH (ref 10.3–14.5)
RBC # FLD: 15.3 % — HIGH (ref 10.3–14.5)
RBC CASTS # UR COMP ASSIST: ABNORMAL /HPF (ref 0–4)
SAO2 % BLDA: 98 % — HIGH (ref 92–96)
SODIUM SERPL-SCNC: 139 MMOL/L — SIGNIFICANT CHANGE UP (ref 135–145)
SODIUM SERPL-SCNC: 141 MMOL/L — SIGNIFICANT CHANGE UP (ref 135–145)
SODIUM SERPL-SCNC: 142 MMOL/L — SIGNIFICANT CHANGE UP (ref 135–145)
SODIUM SERPL-SCNC: 143 MMOL/L — SIGNIFICANT CHANGE UP (ref 135–145)
SODIUM SERPL-SCNC: 146 MMOL/L — HIGH (ref 135–145)
SODIUM SERPL-SCNC: 148 MMOL/L — HIGH (ref 135–145)
SP GR SPEC: 1.01 — SIGNIFICANT CHANGE UP (ref 1.01–1.02)
SPECIMEN SOURCE: SIGNIFICANT CHANGE UP
TROPONIN I SERPL-MCNC: 0.04 NG/ML — SIGNIFICANT CHANGE UP (ref 0.01–0.04)
TSH SERPL-MCNC: 0.12 UU/ML — LOW (ref 0.34–4.82)
TYPE + AB SCN PNL BLD: SIGNIFICANT CHANGE UP
UROBILINOGEN FLD QL: NEGATIVE MG/DL — SIGNIFICANT CHANGE UP
WBC # BLD: 0.43 K/UL — CRITICAL LOW (ref 3.8–10.5)
WBC # BLD: 0.48 K/UL — CRITICAL LOW (ref 3.8–10.5)
WBC # BLD: 0.48 K/UL — CRITICAL LOW (ref 3.8–10.5)
WBC # BLD: 0.5 K/UL — CRITICAL LOW (ref 3.8–10.5)
WBC # BLD: 1.07 K/UL — CRITICAL LOW (ref 3.8–10.5)
WBC # BLD: 11.47 K/UL — HIGH (ref 3.8–10.5)
WBC # BLD: 11.93 K/UL — HIGH (ref 3.8–10.5)
WBC # BLD: 4.32 K/UL — SIGNIFICANT CHANGE UP (ref 3.8–10.5)
WBC # BLD: 7.5 K/UL — SIGNIFICANT CHANGE UP (ref 3.8–10.5)
WBC # BLD: 8.82 K/UL — SIGNIFICANT CHANGE UP (ref 3.8–10.5)
WBC # BLD: 9.6 K/UL — SIGNIFICANT CHANGE UP (ref 3.8–10.5)
WBC # FLD AUTO: 0.43 K/UL — CRITICAL LOW (ref 3.8–10.5)
WBC # FLD AUTO: 0.48 K/UL — CRITICAL LOW (ref 3.8–10.5)
WBC # FLD AUTO: 0.48 K/UL — CRITICAL LOW (ref 3.8–10.5)
WBC # FLD AUTO: 0.5 K/UL — CRITICAL LOW (ref 3.8–10.5)
WBC # FLD AUTO: 1.07 K/UL — CRITICAL LOW (ref 3.8–10.5)
WBC # FLD AUTO: 11.47 K/UL — HIGH (ref 3.8–10.5)
WBC # FLD AUTO: 11.93 K/UL — HIGH (ref 3.8–10.5)
WBC # FLD AUTO: 4.32 K/UL — SIGNIFICANT CHANGE UP (ref 3.8–10.5)
WBC # FLD AUTO: 7.5 K/UL — SIGNIFICANT CHANGE UP (ref 3.8–10.5)
WBC # FLD AUTO: 8.82 K/UL — SIGNIFICANT CHANGE UP (ref 3.8–10.5)
WBC # FLD AUTO: 9.6 K/UL — SIGNIFICANT CHANGE UP (ref 3.8–10.5)
WBC UR QL: SIGNIFICANT CHANGE UP

## 2018-01-01 PROCEDURE — 72157 MRI CHEST SPINE W/O & W/DYE: CPT | Mod: 26

## 2018-01-01 PROCEDURE — 72125 CT NECK SPINE W/O DYE: CPT | Mod: 26

## 2018-01-01 PROCEDURE — 99291 CRITICAL CARE FIRST HOUR: CPT

## 2018-01-01 PROCEDURE — 71045 X-RAY EXAM CHEST 1 VIEW: CPT | Mod: 26

## 2018-01-01 PROCEDURE — 72146 MRI CHEST SPINE W/O DYE: CPT | Mod: 26

## 2018-01-01 PROCEDURE — 74177 CT ABD & PELVIS W/CONTRAST: CPT

## 2018-01-01 PROCEDURE — 72128 CT CHEST SPINE W/O DYE: CPT | Mod: 26

## 2018-01-01 PROCEDURE — 99285 EMERGENCY DEPT VISIT HI MDM: CPT

## 2018-01-01 PROCEDURE — 72156 MRI NECK SPINE W/O & W/DYE: CPT | Mod: 26

## 2018-01-01 PROCEDURE — 93010 ELECTROCARDIOGRAM REPORT: CPT

## 2018-01-01 PROCEDURE — 93306 TTE W/DOPPLER COMPLETE: CPT | Mod: 26

## 2018-01-01 PROCEDURE — 70551 MRI BRAIN STEM W/O DYE: CPT | Mod: 26

## 2018-01-01 PROCEDURE — 78806: CPT | Mod: 26

## 2018-01-01 PROCEDURE — 71260 CT THORAX DX C+: CPT

## 2018-01-01 PROCEDURE — 70450 CT HEAD/BRAIN W/O DYE: CPT | Mod: 26

## 2018-01-01 PROCEDURE — 72125 CT NECK SPINE W/O DYE: CPT

## 2018-01-01 PROCEDURE — 72141 MRI NECK SPINE W/O DYE: CPT | Mod: 26

## 2018-01-01 PROCEDURE — 71275 CT ANGIOGRAPHY CHEST: CPT | Mod: 26

## 2018-01-01 PROCEDURE — 74177 CT ABD & PELVIS W/CONTRAST: CPT | Mod: 26

## 2018-01-01 PROCEDURE — 71260 CT THORAX DX C+: CPT | Mod: 26

## 2018-01-01 RX ORDER — PROPOFOL 10 MG/ML
25 INJECTION, EMULSION INTRAVENOUS
Qty: 1000 | Refills: 0 | Status: DISCONTINUED | OUTPATIENT
Start: 2018-01-01 | End: 2018-01-01

## 2018-01-01 RX ORDER — ENOXAPARIN SODIUM 100 MG/ML
40 INJECTION SUBCUTANEOUS DAILY
Qty: 0 | Refills: 0 | Status: DISCONTINUED | OUTPATIENT
Start: 2018-01-01 | End: 2018-01-01

## 2018-01-01 RX ORDER — CHLORHEXIDINE GLUCONATE 213 G/1000ML
15 SOLUTION TOPICAL
Qty: 0 | Refills: 0 | Status: DISCONTINUED | OUTPATIENT
Start: 2018-01-01 | End: 2018-01-01

## 2018-01-01 RX ORDER — SODIUM CHLORIDE 9 MG/ML
1000 INJECTION, SOLUTION INTRAVENOUS
Qty: 0 | Refills: 0 | Status: DISCONTINUED | OUTPATIENT
Start: 2018-01-01 | End: 2018-01-01

## 2018-01-01 RX ORDER — ENOXAPARIN SODIUM 100 MG/ML
40 INJECTION SUBCUTANEOUS EVERY 24 HOURS
Qty: 0 | Refills: 0 | Status: DISCONTINUED | OUTPATIENT
Start: 2018-01-01 | End: 2018-01-01

## 2018-01-01 RX ORDER — AMLODIPINE BESYLATE 2.5 MG/1
1 TABLET ORAL
Qty: 0 | Refills: 0 | COMMUNITY

## 2018-01-01 RX ORDER — ROBINUL 0.2 MG/ML
0.2 INJECTION INTRAMUSCULAR; INTRAVENOUS EVERY 4 HOURS
Qty: 0 | Refills: 0 | Status: DISCONTINUED | OUTPATIENT
Start: 2018-01-01 | End: 2018-01-01

## 2018-01-01 RX ORDER — MIDAZOLAM HYDROCHLORIDE 1 MG/ML
0.02 INJECTION, SOLUTION INTRAMUSCULAR; INTRAVENOUS
Qty: 100 | Refills: 0 | Status: DISCONTINUED | OUTPATIENT
Start: 2018-01-01 | End: 2018-01-01

## 2018-01-01 RX ORDER — OMEPRAZOLE 10 MG/1
1 CAPSULE, DELAYED RELEASE ORAL
Qty: 0 | Refills: 0 | COMMUNITY

## 2018-01-01 RX ORDER — POLYETHYLENE GLYCOL 3350 17 G/17G
1 POWDER, FOR SOLUTION ORAL
Qty: 0 | Refills: 0 | COMMUNITY

## 2018-01-01 RX ORDER — DEXTROSE 50 % IN WATER 50 %
12.5 SYRINGE (ML) INTRAVENOUS ONCE
Qty: 0 | Refills: 0 | Status: DISCONTINUED | OUTPATIENT
Start: 2018-01-01 | End: 2018-01-01

## 2018-01-01 RX ORDER — ACETAMINOPHEN 500 MG
2 TABLET ORAL
Qty: 0 | Refills: 0 | COMMUNITY
Start: 2018-01-01

## 2018-01-01 RX ORDER — SODIUM CHLORIDE 9 MG/ML
1000 INJECTION INTRAMUSCULAR; INTRAVENOUS; SUBCUTANEOUS
Qty: 0 | Refills: 0 | Status: DISCONTINUED | OUTPATIENT
Start: 2018-01-01 | End: 2018-01-01

## 2018-01-01 RX ORDER — FILGRASTIM 480MCG/1.6
480 VIAL (ML) INJECTION DAILY
Qty: 0 | Refills: 0 | Status: DISCONTINUED | OUTPATIENT
Start: 2018-01-01 | End: 2018-01-01

## 2018-01-01 RX ORDER — FENTANYL CITRATE 50 UG/ML
0.5 INJECTION INTRAVENOUS
Qty: 2500 | Refills: 0 | Status: DISCONTINUED | OUTPATIENT
Start: 2018-01-01 | End: 2018-01-01

## 2018-01-01 RX ORDER — LOSARTAN POTASSIUM 100 MG/1
100 TABLET, FILM COATED ORAL DAILY
Qty: 0 | Refills: 0 | Status: DISCONTINUED | OUTPATIENT
Start: 2018-01-01 | End: 2018-01-01

## 2018-01-01 RX ORDER — INSULIN LISPRO 100/ML
VIAL (ML) SUBCUTANEOUS AT BEDTIME
Qty: 0 | Refills: 0 | Status: DISCONTINUED | OUTPATIENT
Start: 2018-01-01 | End: 2018-01-01

## 2018-01-01 RX ORDER — HYDROMORPHONE HYDROCHLORIDE 2 MG/ML
1 INJECTION INTRAMUSCULAR; INTRAVENOUS; SUBCUTANEOUS
Qty: 0 | Refills: 0 | Status: DISCONTINUED | OUTPATIENT
Start: 2018-01-01 | End: 2018-01-01

## 2018-01-01 RX ORDER — PHENYLEPHRINE HYDROCHLORIDE 10 MG/ML
0.1 INJECTION INTRAVENOUS
Qty: 40 | Refills: 0 | Status: DISCONTINUED | OUTPATIENT
Start: 2018-01-01 | End: 2018-01-01

## 2018-01-01 RX ORDER — INSULIN LISPRO 100/ML
6 VIAL (ML) SUBCUTANEOUS ONCE
Qty: 0 | Refills: 0 | Status: COMPLETED | OUTPATIENT
Start: 2018-01-01 | End: 2018-01-01

## 2018-01-01 RX ORDER — FOLIC ACID 0.8 MG
1 TABLET ORAL
Qty: 0 | Refills: 0 | COMMUNITY

## 2018-01-01 RX ORDER — OXYCODONE HYDROCHLORIDE 5 MG/1
5 TABLET ORAL EVERY 4 HOURS
Qty: 0 | Refills: 0 | Status: DISCONTINUED | OUTPATIENT
Start: 2018-01-01 | End: 2018-01-01

## 2018-01-01 RX ORDER — PIPERACILLIN AND TAZOBACTAM 4; .5 G/20ML; G/20ML
3.38 INJECTION, POWDER, LYOPHILIZED, FOR SOLUTION INTRAVENOUS ONCE
Qty: 0 | Refills: 0 | Status: COMPLETED | OUTPATIENT
Start: 2018-01-01 | End: 2018-01-01

## 2018-01-01 RX ORDER — SODIUM CHLORIDE 9 MG/ML
2000 INJECTION INTRAMUSCULAR; INTRAVENOUS; SUBCUTANEOUS ONCE
Qty: 0 | Refills: 0 | Status: COMPLETED | OUTPATIENT
Start: 2018-01-01 | End: 2018-01-01

## 2018-01-01 RX ORDER — METFORMIN HYDROCHLORIDE 850 MG/1
2 TABLET ORAL
Qty: 0 | Refills: 0 | COMMUNITY

## 2018-01-01 RX ORDER — DEXTROSE 50 % IN WATER 50 %
25 SYRINGE (ML) INTRAVENOUS ONCE
Qty: 0 | Refills: 0 | Status: DISCONTINUED | OUTPATIENT
Start: 2018-01-01 | End: 2018-01-01

## 2018-01-01 RX ORDER — INSULIN LISPRO 100/ML
VIAL (ML) SUBCUTANEOUS EVERY 6 HOURS
Qty: 0 | Refills: 0 | Status: DISCONTINUED | OUTPATIENT
Start: 2018-01-01 | End: 2018-01-01

## 2018-01-01 RX ORDER — ATORVASTATIN CALCIUM 80 MG/1
20 TABLET, FILM COATED ORAL AT BEDTIME
Qty: 0 | Refills: 0 | Status: DISCONTINUED | OUTPATIENT
Start: 2018-01-01 | End: 2018-01-01

## 2018-01-01 RX ORDER — ATORVASTATIN CALCIUM 80 MG/1
1 TABLET, FILM COATED ORAL
Qty: 0 | Refills: 0 | COMMUNITY

## 2018-01-01 RX ORDER — INSULIN GLARGINE 100 [IU]/ML
15 INJECTION, SOLUTION SUBCUTANEOUS AT BEDTIME
Qty: 0 | Refills: 0 | Status: DISCONTINUED | OUTPATIENT
Start: 2018-01-01 | End: 2018-01-01

## 2018-01-01 RX ORDER — PANTOPRAZOLE SODIUM 20 MG/1
40 TABLET, DELAYED RELEASE ORAL
Qty: 0 | Refills: 0 | Status: DISCONTINUED | OUTPATIENT
Start: 2018-01-01 | End: 2018-01-01

## 2018-01-01 RX ORDER — ACETAMINOPHEN 500 MG
650 TABLET ORAL EVERY 6 HOURS
Qty: 0 | Refills: 0 | Status: DISCONTINUED | OUTPATIENT
Start: 2018-01-01 | End: 2018-01-01

## 2018-01-01 RX ORDER — INSULIN GLARGINE 100 [IU]/ML
10 INJECTION, SOLUTION SUBCUTANEOUS AT BEDTIME
Qty: 0 | Refills: 0 | Status: DISCONTINUED | OUTPATIENT
Start: 2018-01-01 | End: 2018-01-01

## 2018-01-01 RX ORDER — PANTOPRAZOLE SODIUM 20 MG/1
1 TABLET, DELAYED RELEASE ORAL
Qty: 0 | Refills: 0 | COMMUNITY
Start: 2018-01-01

## 2018-01-01 RX ORDER — INSULIN GLARGINE 100 [IU]/ML
5 INJECTION, SOLUTION SUBCUTANEOUS AT BEDTIME
Qty: 0 | Refills: 0 | Status: DISCONTINUED | OUTPATIENT
Start: 2018-01-01 | End: 2018-01-01

## 2018-01-01 RX ORDER — FILGRASTIM 480MCG/1.6
480 VIAL (ML) INJECTION ONCE
Qty: 0 | Refills: 0 | Status: COMPLETED | OUTPATIENT
Start: 2018-01-01 | End: 2018-01-01

## 2018-01-01 RX ORDER — INSULIN LISPRO 100/ML
VIAL (ML) SUBCUTANEOUS
Qty: 0 | Refills: 0 | Status: DISCONTINUED | OUTPATIENT
Start: 2018-01-01 | End: 2018-01-01

## 2018-01-01 RX ORDER — GLUCAGON INJECTION, SOLUTION 0.5 MG/.1ML
1 INJECTION, SOLUTION SUBCUTANEOUS ONCE
Qty: 0 | Refills: 0 | Status: DISCONTINUED | OUTPATIENT
Start: 2018-01-01 | End: 2018-01-01

## 2018-01-01 RX ORDER — SENNA PLUS 8.6 MG/1
1 TABLET ORAL
Qty: 0 | Refills: 0 | COMMUNITY

## 2018-01-01 RX ORDER — HEPARIN SODIUM 5000 [USP'U]/ML
5000 INJECTION INTRAVENOUS; SUBCUTANEOUS EVERY 8 HOURS
Qty: 0 | Refills: 0 | Status: DISCONTINUED | OUTPATIENT
Start: 2018-01-01 | End: 2018-01-01

## 2018-01-01 RX ORDER — PANTOPRAZOLE SODIUM 20 MG/1
40 TABLET, DELAYED RELEASE ORAL DAILY
Qty: 0 | Refills: 0 | Status: DISCONTINUED | OUTPATIENT
Start: 2018-01-01 | End: 2018-01-01

## 2018-01-01 RX ORDER — DEXAMETHASONE 0.5 MG/5ML
4 ELIXIR ORAL EVERY 6 HOURS
Qty: 0 | Refills: 0 | Status: DISCONTINUED | OUTPATIENT
Start: 2018-01-01 | End: 2018-01-01

## 2018-01-01 RX ORDER — DEXTROSE 50 % IN WATER 50 %
15 SYRINGE (ML) INTRAVENOUS ONCE
Qty: 0 | Refills: 0 | Status: DISCONTINUED | OUTPATIENT
Start: 2018-01-01 | End: 2018-01-01

## 2018-01-01 RX ORDER — ATORVASTATIN CALCIUM 80 MG/1
40 TABLET, FILM COATED ORAL DAILY
Qty: 0 | Refills: 0 | Status: DISCONTINUED | OUTPATIENT
Start: 2018-01-01 | End: 2018-01-01

## 2018-01-01 RX ORDER — HYDROMORPHONE HYDROCHLORIDE 2 MG/ML
1 INJECTION INTRAMUSCULAR; INTRAVENOUS; SUBCUTANEOUS EVERY 4 HOURS
Qty: 0 | Refills: 0 | Status: DISCONTINUED | OUTPATIENT
Start: 2018-01-01 | End: 2018-01-01

## 2018-01-01 RX ORDER — PIPERACILLIN AND TAZOBACTAM 4; .5 G/20ML; G/20ML
3.38 INJECTION, POWDER, LYOPHILIZED, FOR SOLUTION INTRAVENOUS EVERY 8 HOURS
Qty: 0 | Refills: 0 | Status: DISCONTINUED | OUTPATIENT
Start: 2018-01-01 | End: 2018-01-01

## 2018-01-01 RX ORDER — GABAPENTIN 400 MG/1
1 CAPSULE ORAL
Qty: 0 | Refills: 0 | COMMUNITY

## 2018-01-01 RX ORDER — EPINEPHRINE 0.3 MG/.3ML
0.05 INJECTION INTRAMUSCULAR; SUBCUTANEOUS
Qty: 4 | Refills: 0 | Status: DISCONTINUED | OUTPATIENT
Start: 2018-01-01 | End: 2018-01-01

## 2018-01-01 RX ORDER — OXYCODONE HYDROCHLORIDE 5 MG/1
1 TABLET ORAL
Qty: 0 | Refills: 0 | COMMUNITY
Start: 2018-01-01

## 2018-01-01 RX ORDER — POLYETHYLENE GLYCOL 3350 17 G/17G
17 POWDER, FOR SOLUTION ORAL DAILY
Qty: 0 | Refills: 0 | Status: DISCONTINUED | OUTPATIENT
Start: 2018-01-01 | End: 2018-01-01

## 2018-01-01 RX ORDER — OXYCODONE HYDROCHLORIDE 5 MG/1
10 TABLET ORAL EVERY 4 HOURS
Qty: 0 | Refills: 0 | Status: DISCONTINUED | OUTPATIENT
Start: 2018-01-01 | End: 2018-01-01

## 2018-01-01 RX ORDER — DOCUSATE SODIUM 100 MG
100 CAPSULE ORAL
Qty: 0 | Refills: 0 | Status: DISCONTINUED | OUTPATIENT
Start: 2018-01-01 | End: 2018-01-01

## 2018-01-01 RX ORDER — METOPROLOL TARTRATE 50 MG
1 TABLET ORAL
Qty: 0 | Refills: 0 | COMMUNITY
Start: 2018-01-01

## 2018-01-01 RX ORDER — AMLODIPINE BESYLATE 2.5 MG/1
5 TABLET ORAL DAILY
Qty: 0 | Refills: 0 | Status: DISCONTINUED | OUTPATIENT
Start: 2018-01-01 | End: 2018-01-01

## 2018-01-01 RX ORDER — FENTANYL CITRATE 50 UG/ML
50 INJECTION INTRAVENOUS ONCE
Qty: 0 | Refills: 0 | Status: DISCONTINUED | OUTPATIENT
Start: 2018-01-01 | End: 2018-01-01

## 2018-01-01 RX ORDER — METOPROLOL TARTRATE 50 MG
25 TABLET ORAL DAILY
Qty: 0 | Refills: 0 | Status: DISCONTINUED | OUTPATIENT
Start: 2018-01-01 | End: 2018-01-01

## 2018-01-01 RX ORDER — SUCCINYLCHOLINE CHLORIDE 100 MG/5ML
100 SYRINGE (ML) INTRAVENOUS ONCE
Qty: 0 | Refills: 0 | Status: COMPLETED | OUTPATIENT
Start: 2018-01-01 | End: 2018-01-01

## 2018-01-01 RX ORDER — INSULIN GLARGINE 100 [IU]/ML
0 INJECTION, SOLUTION SUBCUTANEOUS
Qty: 0 | Refills: 0 | COMMUNITY
Start: 2018-01-01

## 2018-01-01 RX ORDER — DEXAMETHASONE 0.5 MG/5ML
4 ELIXIR ORAL EVERY 8 HOURS
Qty: 0 | Refills: 0 | Status: DISCONTINUED | OUTPATIENT
Start: 2018-01-01 | End: 2018-01-01

## 2018-01-01 RX ORDER — VANCOMYCIN HCL 1 G
1250 VIAL (EA) INTRAVENOUS ONCE
Qty: 0 | Refills: 0 | Status: COMPLETED | OUTPATIENT
Start: 2018-01-01 | End: 2018-01-01

## 2018-01-01 RX ORDER — AMLODIPINE BESYLATE 2.5 MG/1
2 TABLET ORAL
Qty: 0 | Refills: 0 | COMMUNITY

## 2018-01-01 RX ORDER — INSULIN GLARGINE 100 [IU]/ML
5 INJECTION, SOLUTION SUBCUTANEOUS EVERY MORNING
Qty: 0 | Refills: 0 | Status: DISCONTINUED | OUTPATIENT
Start: 2018-01-01 | End: 2018-01-01

## 2018-01-01 RX ORDER — DOCUSATE SODIUM 100 MG
3 CAPSULE ORAL
Qty: 0 | Refills: 0 | COMMUNITY

## 2018-01-01 RX ORDER — DOPAMINE HYDROCHLORIDE 40 MG/ML
10 INJECTION, SOLUTION, CONCENTRATE INTRAVENOUS
Qty: 400 | Refills: 0 | Status: DISCONTINUED | OUTPATIENT
Start: 2018-01-01 | End: 2018-01-01

## 2018-01-01 RX ORDER — INFLUENZA VIRUS VACCINE 15; 15; 15; 15 UG/.5ML; UG/.5ML; UG/.5ML; UG/.5ML
0.5 SUSPENSION INTRAMUSCULAR ONCE
Qty: 0 | Refills: 0 | Status: DISCONTINUED | OUTPATIENT
Start: 2018-01-01 | End: 2018-01-01

## 2018-01-01 RX ORDER — MIRTAZAPINE 45 MG/1
1 TABLET, ORALLY DISINTEGRATING ORAL
Qty: 0 | Refills: 0 | COMMUNITY

## 2018-01-01 RX ORDER — INSULIN LISPRO 100/ML
4 VIAL (ML) SUBCUTANEOUS ONCE
Qty: 0 | Refills: 0 | Status: COMPLETED | OUTPATIENT
Start: 2018-01-01 | End: 2018-01-01

## 2018-01-01 RX ORDER — DEXAMETHASONE 0.5 MG/5ML
4 ELIXIR ORAL
Qty: 0 | Refills: 0 | COMMUNITY
Start: 2018-01-01

## 2018-01-01 RX ORDER — ETOMIDATE 2 MG/ML
20 INJECTION INTRAVENOUS ONCE
Qty: 0 | Refills: 0 | Status: COMPLETED | OUTPATIENT
Start: 2018-01-01 | End: 2018-01-01

## 2018-01-01 RX ADMIN — DOPAMINE HYDROCHLORIDE 30.98 MICROGRAM(S)/KG/MIN: 40 INJECTION, SOLUTION, CONCENTRATE INTRAVENOUS at 19:22

## 2018-01-01 RX ADMIN — SODIUM CHLORIDE 75 MILLILITER(S): 9 INJECTION INTRAMUSCULAR; INTRAVENOUS; SUBCUTANEOUS at 05:17

## 2018-01-01 RX ADMIN — HYDROMORPHONE HYDROCHLORIDE 1 MILLIGRAM(S): 2 INJECTION INTRAMUSCULAR; INTRAVENOUS; SUBCUTANEOUS at 08:44

## 2018-01-01 RX ADMIN — CHLORHEXIDINE GLUCONATE 15 MILLILITER(S): 213 SOLUTION TOPICAL at 05:13

## 2018-01-01 RX ADMIN — CHLORHEXIDINE GLUCONATE 15 MILLILITER(S): 213 SOLUTION TOPICAL at 18:01

## 2018-01-01 RX ADMIN — AMLODIPINE BESYLATE 5 MILLIGRAM(S): 2.5 TABLET ORAL at 06:09

## 2018-01-01 RX ADMIN — Medication 4 MILLIGRAM(S): at 17:20

## 2018-01-01 RX ADMIN — ATORVASTATIN CALCIUM 20 MILLIGRAM(S): 80 TABLET, FILM COATED ORAL at 21:32

## 2018-01-01 RX ADMIN — SODIUM CHLORIDE 75 MILLILITER(S): 9 INJECTION, SOLUTION INTRAVENOUS at 15:12

## 2018-01-01 RX ADMIN — SODIUM CHLORIDE 75 MILLILITER(S): 9 INJECTION INTRAMUSCULAR; INTRAVENOUS; SUBCUTANEOUS at 21:46

## 2018-01-01 RX ADMIN — PANTOPRAZOLE SODIUM 40 MILLIGRAM(S): 20 TABLET, DELAYED RELEASE ORAL at 12:31

## 2018-01-01 RX ADMIN — Medication 4 MILLIGRAM(S): at 12:46

## 2018-01-01 RX ADMIN — Medication 4 MILLIGRAM(S): at 12:31

## 2018-01-01 RX ADMIN — PANTOPRAZOLE SODIUM 40 MILLIGRAM(S): 20 TABLET, DELAYED RELEASE ORAL at 11:37

## 2018-01-01 RX ADMIN — AMLODIPINE BESYLATE 5 MILLIGRAM(S): 2.5 TABLET ORAL at 05:41

## 2018-01-01 RX ADMIN — CHLORHEXIDINE GLUCONATE 15 MILLILITER(S): 213 SOLUTION TOPICAL at 17:22

## 2018-01-01 RX ADMIN — LOSARTAN POTASSIUM 100 MILLIGRAM(S): 100 TABLET, FILM COATED ORAL at 06:09

## 2018-01-01 RX ADMIN — HYDROMORPHONE HYDROCHLORIDE 1 MILLIGRAM(S): 2 INJECTION INTRAMUSCULAR; INTRAVENOUS; SUBCUTANEOUS at 12:17

## 2018-01-01 RX ADMIN — FENTANYL CITRATE 4.13 MICROGRAM(S)/KG/HR: 50 INJECTION INTRAVENOUS at 02:53

## 2018-01-01 RX ADMIN — PANTOPRAZOLE SODIUM 40 MILLIGRAM(S): 20 TABLET, DELAYED RELEASE ORAL at 05:30

## 2018-01-01 RX ADMIN — FENTANYL CITRATE 4.13 MICROGRAM(S)/KG/HR: 50 INJECTION INTRAVENOUS at 19:56

## 2018-01-01 RX ADMIN — OXYCODONE HYDROCHLORIDE 10 MILLIGRAM(S): 5 TABLET ORAL at 16:50

## 2018-01-01 RX ADMIN — HYDROMORPHONE HYDROCHLORIDE 1 MILLIGRAM(S): 2 INJECTION INTRAMUSCULAR; INTRAVENOUS; SUBCUTANEOUS at 21:45

## 2018-01-01 RX ADMIN — Medication 2: at 08:00

## 2018-01-01 RX ADMIN — PANTOPRAZOLE SODIUM 40 MILLIGRAM(S): 20 TABLET, DELAYED RELEASE ORAL at 12:37

## 2018-01-01 RX ADMIN — OXYCODONE HYDROCHLORIDE 10 MILLIGRAM(S): 5 TABLET ORAL at 20:27

## 2018-01-01 RX ADMIN — OXYCODONE HYDROCHLORIDE 10 MILLIGRAM(S): 5 TABLET ORAL at 14:06

## 2018-01-01 RX ADMIN — Medication 2: at 05:24

## 2018-01-01 RX ADMIN — Medication 2: at 19:23

## 2018-01-01 RX ADMIN — HYDROMORPHONE HYDROCHLORIDE 1 MILLIGRAM(S): 2 INJECTION INTRAMUSCULAR; INTRAVENOUS; SUBCUTANEOUS at 22:15

## 2018-01-01 RX ADMIN — PANTOPRAZOLE SODIUM 40 MILLIGRAM(S): 20 TABLET, DELAYED RELEASE ORAL at 15:24

## 2018-01-01 RX ADMIN — HYDROMORPHONE HYDROCHLORIDE 1 MILLIGRAM(S): 2 INJECTION INTRAMUSCULAR; INTRAVENOUS; SUBCUTANEOUS at 19:45

## 2018-01-01 RX ADMIN — HYDROMORPHONE HYDROCHLORIDE 1 MILLIGRAM(S): 2 INJECTION INTRAMUSCULAR; INTRAVENOUS; SUBCUTANEOUS at 12:00

## 2018-01-01 RX ADMIN — Medication 2: at 05:27

## 2018-01-01 RX ADMIN — Medication 4: at 08:10

## 2018-01-01 RX ADMIN — Medication 2 MILLIGRAM(S): at 13:00

## 2018-01-01 RX ADMIN — Medication 4 MILLIGRAM(S): at 12:42

## 2018-01-01 RX ADMIN — HYDROMORPHONE HYDROCHLORIDE 1 MILLIGRAM(S): 2 INJECTION INTRAMUSCULAR; INTRAVENOUS; SUBCUTANEOUS at 23:50

## 2018-01-01 RX ADMIN — Medication 480 MICROGRAM(S): at 16:30

## 2018-01-01 RX ADMIN — Medication 2: at 08:25

## 2018-01-01 RX ADMIN — Medication 4 UNIT(S): at 19:14

## 2018-01-01 RX ADMIN — SODIUM CHLORIDE 75 MILLILITER(S): 9 INJECTION INTRAMUSCULAR; INTRAVENOUS; SUBCUTANEOUS at 21:31

## 2018-01-01 RX ADMIN — Medication 4 MILLIGRAM(S): at 21:38

## 2018-01-01 RX ADMIN — CHLORHEXIDINE GLUCONATE 15 MILLILITER(S): 213 SOLUTION TOPICAL at 05:18

## 2018-01-01 RX ADMIN — Medication 2: at 16:51

## 2018-01-01 RX ADMIN — Medication 1: at 21:39

## 2018-01-01 RX ADMIN — Medication 4 MILLIGRAM(S): at 05:26

## 2018-01-01 RX ADMIN — PROPOFOL 12.39 MICROGRAM(S)/KG/MIN: 10 INJECTION, EMULSION INTRAVENOUS at 12:28

## 2018-01-01 RX ADMIN — DOPAMINE HYDROCHLORIDE 30.98 MICROGRAM(S)/KG/MIN: 40 INJECTION, SOLUTION, CONCENTRATE INTRAVENOUS at 00:04

## 2018-01-01 RX ADMIN — HYDROMORPHONE HYDROCHLORIDE 1 MILLIGRAM(S): 2 INJECTION INTRAMUSCULAR; INTRAVENOUS; SUBCUTANEOUS at 04:45

## 2018-01-01 RX ADMIN — Medication 4 MILLIGRAM(S): at 23:46

## 2018-01-01 RX ADMIN — Medication 4 MILLIGRAM(S): at 05:13

## 2018-01-01 RX ADMIN — DOPAMINE HYDROCHLORIDE 30.98 MICROGRAM(S)/KG/MIN: 40 INJECTION, SOLUTION, CONCENTRATE INTRAVENOUS at 21:11

## 2018-01-01 RX ADMIN — PIPERACILLIN AND TAZOBACTAM 25 GRAM(S): 4; .5 INJECTION, POWDER, LYOPHILIZED, FOR SOLUTION INTRAVENOUS at 05:18

## 2018-01-01 RX ADMIN — HEPARIN SODIUM 5000 UNIT(S): 5000 INJECTION INTRAVENOUS; SUBCUTANEOUS at 21:39

## 2018-01-01 RX ADMIN — PIPERACILLIN AND TAZOBACTAM 25 GRAM(S): 4; .5 INJECTION, POWDER, LYOPHILIZED, FOR SOLUTION INTRAVENOUS at 13:11

## 2018-01-01 RX ADMIN — PIPERACILLIN AND TAZOBACTAM 25 GRAM(S): 4; .5 INJECTION, POWDER, LYOPHILIZED, FOR SOLUTION INTRAVENOUS at 22:05

## 2018-01-01 RX ADMIN — PIPERACILLIN AND TAZOBACTAM 25 GRAM(S): 4; .5 INJECTION, POWDER, LYOPHILIZED, FOR SOLUTION INTRAVENOUS at 21:45

## 2018-01-01 RX ADMIN — HYDROMORPHONE HYDROCHLORIDE 1 MILLIGRAM(S): 2 INJECTION INTRAMUSCULAR; INTRAVENOUS; SUBCUTANEOUS at 05:15

## 2018-01-01 RX ADMIN — HYDROMORPHONE HYDROCHLORIDE 1 MILLIGRAM(S): 2 INJECTION INTRAMUSCULAR; INTRAVENOUS; SUBCUTANEOUS at 13:33

## 2018-01-01 RX ADMIN — Medication 4 MILLIGRAM(S): at 13:23

## 2018-01-01 RX ADMIN — PIPERACILLIN AND TAZOBACTAM 25 GRAM(S): 4; .5 INJECTION, POWDER, LYOPHILIZED, FOR SOLUTION INTRAVENOUS at 05:41

## 2018-01-01 RX ADMIN — HYDROMORPHONE HYDROCHLORIDE 1 MILLIGRAM(S): 2 INJECTION INTRAMUSCULAR; INTRAVENOUS; SUBCUTANEOUS at 09:20

## 2018-01-01 RX ADMIN — PIPERACILLIN AND TAZOBACTAM 25 GRAM(S): 4; .5 INJECTION, POWDER, LYOPHILIZED, FOR SOLUTION INTRAVENOUS at 13:52

## 2018-01-01 RX ADMIN — HYDROMORPHONE HYDROCHLORIDE 1 MILLIGRAM(S): 2 INJECTION INTRAMUSCULAR; INTRAVENOUS; SUBCUTANEOUS at 13:51

## 2018-01-01 RX ADMIN — ENOXAPARIN SODIUM 40 MILLIGRAM(S): 100 INJECTION SUBCUTANEOUS at 11:27

## 2018-01-01 RX ADMIN — HYDROMORPHONE HYDROCHLORIDE 1 MILLIGRAM(S): 2 INJECTION INTRAMUSCULAR; INTRAVENOUS; SUBCUTANEOUS at 08:27

## 2018-01-01 RX ADMIN — OXYCODONE HYDROCHLORIDE 10 MILLIGRAM(S): 5 TABLET ORAL at 05:44

## 2018-01-01 RX ADMIN — ENOXAPARIN SODIUM 40 MILLIGRAM(S): 100 INJECTION SUBCUTANEOUS at 05:15

## 2018-01-01 RX ADMIN — FENTANYL CITRATE 4.13 MICROGRAM(S)/KG/HR: 50 INJECTION INTRAVENOUS at 17:09

## 2018-01-01 RX ADMIN — HYDROMORPHONE HYDROCHLORIDE 1 MILLIGRAM(S): 2 INJECTION INTRAMUSCULAR; INTRAVENOUS; SUBCUTANEOUS at 04:30

## 2018-01-01 RX ADMIN — LOSARTAN POTASSIUM 100 MILLIGRAM(S): 100 TABLET, FILM COATED ORAL at 05:38

## 2018-01-01 RX ADMIN — HEPARIN SODIUM 5000 UNIT(S): 5000 INJECTION INTRAVENOUS; SUBCUTANEOUS at 13:38

## 2018-01-01 RX ADMIN — HYDROMORPHONE HYDROCHLORIDE 1 MILLIGRAM(S): 2 INJECTION INTRAMUSCULAR; INTRAVENOUS; SUBCUTANEOUS at 09:05

## 2018-01-01 RX ADMIN — SODIUM CHLORIDE 75 MILLILITER(S): 9 INJECTION INTRAMUSCULAR; INTRAVENOUS; SUBCUTANEOUS at 05:15

## 2018-01-01 RX ADMIN — PANTOPRAZOLE SODIUM 40 MILLIGRAM(S): 20 TABLET, DELAYED RELEASE ORAL at 12:46

## 2018-01-01 RX ADMIN — Medication 100 MILLIGRAM(S): at 05:30

## 2018-01-01 RX ADMIN — HYDROMORPHONE HYDROCHLORIDE 1 MILLIGRAM(S): 2 INJECTION INTRAMUSCULAR; INTRAVENOUS; SUBCUTANEOUS at 22:00

## 2018-01-01 RX ADMIN — Medication 2 MILLIGRAM(S): at 10:46

## 2018-01-01 RX ADMIN — HYDROMORPHONE HYDROCHLORIDE 1 MILLIGRAM(S): 2 INJECTION INTRAMUSCULAR; INTRAVENOUS; SUBCUTANEOUS at 02:45

## 2018-01-01 RX ADMIN — Medication 4 MILLIGRAM(S): at 11:26

## 2018-01-01 RX ADMIN — INSULIN GLARGINE 5 UNIT(S): 100 INJECTION, SOLUTION SUBCUTANEOUS at 09:45

## 2018-01-01 RX ADMIN — Medication 2 MILLIGRAM(S): at 05:41

## 2018-01-01 RX ADMIN — Medication 4 MILLIGRAM(S): at 21:41

## 2018-01-01 RX ADMIN — OXYCODONE HYDROCHLORIDE 10 MILLIGRAM(S): 5 TABLET ORAL at 16:48

## 2018-01-01 RX ADMIN — PIPERACILLIN AND TAZOBACTAM 25 GRAM(S): 4; .5 INJECTION, POWDER, LYOPHILIZED, FOR SOLUTION INTRAVENOUS at 13:49

## 2018-01-01 RX ADMIN — HYDROMORPHONE HYDROCHLORIDE 1 MILLIGRAM(S): 2 INJECTION INTRAMUSCULAR; INTRAVENOUS; SUBCUTANEOUS at 23:55

## 2018-01-01 RX ADMIN — Medication 4 MILLIGRAM(S): at 21:39

## 2018-01-01 RX ADMIN — FENTANYL CITRATE 4.13 MICROGRAM(S)/KG/HR: 50 INJECTION INTRAVENOUS at 09:16

## 2018-01-01 RX ADMIN — Medication 2: at 23:46

## 2018-01-01 RX ADMIN — POLYETHYLENE GLYCOL 3350 17 GRAM(S): 17 POWDER, FOR SOLUTION ORAL at 13:32

## 2018-01-01 RX ADMIN — ENOXAPARIN SODIUM 40 MILLIGRAM(S): 100 INJECTION SUBCUTANEOUS at 05:27

## 2018-01-01 RX ADMIN — Medication 3: at 12:16

## 2018-01-01 RX ADMIN — HYDROMORPHONE HYDROCHLORIDE 1 MILLIGRAM(S): 2 INJECTION INTRAMUSCULAR; INTRAVENOUS; SUBCUTANEOUS at 10:00

## 2018-01-01 RX ADMIN — ROBINUL 0.2 MILLIGRAM(S): 0.2 INJECTION INTRAMUSCULAR; INTRAVENOUS at 15:25

## 2018-01-01 RX ADMIN — Medication 2 MILLIGRAM(S): at 05:15

## 2018-01-01 RX ADMIN — HYDROMORPHONE HYDROCHLORIDE 1 MILLIGRAM(S): 2 INJECTION INTRAMUSCULAR; INTRAVENOUS; SUBCUTANEOUS at 13:48

## 2018-01-01 RX ADMIN — PIPERACILLIN AND TAZOBACTAM 200 GRAM(S): 4; .5 INJECTION, POWDER, LYOPHILIZED, FOR SOLUTION INTRAVENOUS at 16:28

## 2018-01-01 RX ADMIN — CHLORHEXIDINE GLUCONATE 15 MILLILITER(S): 213 SOLUTION TOPICAL at 19:27

## 2018-01-01 RX ADMIN — PIPERACILLIN AND TAZOBACTAM 25 GRAM(S): 4; .5 INJECTION, POWDER, LYOPHILIZED, FOR SOLUTION INTRAVENOUS at 22:07

## 2018-01-01 RX ADMIN — Medication 2: at 18:01

## 2018-01-01 RX ADMIN — Medication 5: at 12:34

## 2018-01-01 RX ADMIN — CHLORHEXIDINE GLUCONATE 15 MILLILITER(S): 213 SOLUTION TOPICAL at 05:17

## 2018-01-01 RX ADMIN — HEPARIN SODIUM 5000 UNIT(S): 5000 INJECTION INTRAVENOUS; SUBCUTANEOUS at 21:41

## 2018-01-01 RX ADMIN — HEPARIN SODIUM 5000 UNIT(S): 5000 INJECTION INTRAVENOUS; SUBCUTANEOUS at 13:28

## 2018-01-01 RX ADMIN — SODIUM CHLORIDE 75 MILLILITER(S): 9 INJECTION INTRAMUSCULAR; INTRAVENOUS; SUBCUTANEOUS at 16:29

## 2018-01-01 RX ADMIN — Medication 4 MILLIGRAM(S): at 13:36

## 2018-01-01 RX ADMIN — SODIUM CHLORIDE 75 MILLILITER(S): 9 INJECTION INTRAMUSCULAR; INTRAVENOUS; SUBCUTANEOUS at 05:13

## 2018-01-01 RX ADMIN — Medication 650 MILLIGRAM(S): at 23:05

## 2018-01-01 RX ADMIN — Medication 2 MILLIGRAM(S): at 11:51

## 2018-01-01 RX ADMIN — PIPERACILLIN AND TAZOBACTAM 25 GRAM(S): 4; .5 INJECTION, POWDER, LYOPHILIZED, FOR SOLUTION INTRAVENOUS at 05:16

## 2018-01-01 RX ADMIN — Medication 480 MICROGRAM(S): at 12:55

## 2018-01-01 RX ADMIN — Medication 2: at 05:16

## 2018-01-01 RX ADMIN — ATORVASTATIN CALCIUM 20 MILLIGRAM(S): 80 TABLET, FILM COATED ORAL at 21:41

## 2018-01-01 RX ADMIN — PIPERACILLIN AND TAZOBACTAM 25 GRAM(S): 4; .5 INJECTION, POWDER, LYOPHILIZED, FOR SOLUTION INTRAVENOUS at 21:11

## 2018-01-01 RX ADMIN — Medication 100 MILLIGRAM(S): at 05:41

## 2018-01-01 RX ADMIN — HYDROMORPHONE HYDROCHLORIDE 1 MILLIGRAM(S): 2 INJECTION INTRAMUSCULAR; INTRAVENOUS; SUBCUTANEOUS at 08:29

## 2018-01-01 RX ADMIN — POLYETHYLENE GLYCOL 3350 17 GRAM(S): 17 POWDER, FOR SOLUTION ORAL at 12:19

## 2018-01-01 RX ADMIN — Medication 2 MILLIGRAM(S): at 23:40

## 2018-01-01 RX ADMIN — Medication 2 MILLIGRAM(S): at 06:41

## 2018-01-01 RX ADMIN — LOSARTAN POTASSIUM 100 MILLIGRAM(S): 100 TABLET, FILM COATED ORAL at 05:41

## 2018-01-01 RX ADMIN — OXYCODONE HYDROCHLORIDE 10 MILLIGRAM(S): 5 TABLET ORAL at 21:30

## 2018-01-01 RX ADMIN — HYDROMORPHONE HYDROCHLORIDE 1 MILLIGRAM(S): 2 INJECTION INTRAMUSCULAR; INTRAVENOUS; SUBCUTANEOUS at 13:04

## 2018-01-01 RX ADMIN — HYDROMORPHONE HYDROCHLORIDE 1 MILLIGRAM(S): 2 INJECTION INTRAMUSCULAR; INTRAVENOUS; SUBCUTANEOUS at 02:30

## 2018-01-01 RX ADMIN — HEPARIN SODIUM 5000 UNIT(S): 5000 INJECTION INTRAVENOUS; SUBCUTANEOUS at 06:09

## 2018-01-01 RX ADMIN — Medication 100 MILLIGRAM(S): at 17:16

## 2018-01-01 RX ADMIN — Medication 100 MILLIGRAM(S): at 16:50

## 2018-01-01 RX ADMIN — HYDROMORPHONE HYDROCHLORIDE 1 MILLIGRAM(S): 2 INJECTION INTRAMUSCULAR; INTRAVENOUS; SUBCUTANEOUS at 08:40

## 2018-01-01 RX ADMIN — Medication 6: at 17:16

## 2018-01-01 RX ADMIN — HYDROMORPHONE HYDROCHLORIDE 1 MILLIGRAM(S): 2 INJECTION INTRAMUSCULAR; INTRAVENOUS; SUBCUTANEOUS at 09:14

## 2018-01-01 RX ADMIN — Medication 1: at 21:38

## 2018-01-01 RX ADMIN — AMLODIPINE BESYLATE 5 MILLIGRAM(S): 2.5 TABLET ORAL at 17:56

## 2018-01-01 RX ADMIN — HYDROMORPHONE HYDROCHLORIDE 1 MILLIGRAM(S): 2 INJECTION INTRAMUSCULAR; INTRAVENOUS; SUBCUTANEOUS at 20:10

## 2018-01-01 RX ADMIN — Medication 166.67 MILLIGRAM(S): at 17:21

## 2018-01-01 RX ADMIN — OXYCODONE HYDROCHLORIDE 10 MILLIGRAM(S): 5 TABLET ORAL at 06:14

## 2018-01-01 RX ADMIN — DOPAMINE HYDROCHLORIDE 30.98 MICROGRAM(S)/KG/MIN: 40 INJECTION, SOLUTION, CONCENTRATE INTRAVENOUS at 17:05

## 2018-01-01 RX ADMIN — Medication 100 MILLIGRAM(S): at 16:52

## 2018-01-01 RX ADMIN — Medication 2: at 12:31

## 2018-01-01 RX ADMIN — Medication 4 MILLIGRAM(S): at 23:47

## 2018-01-01 RX ADMIN — DOPAMINE HYDROCHLORIDE 30.98 MICROGRAM(S)/KG/MIN: 40 INJECTION, SOLUTION, CONCENTRATE INTRAVENOUS at 05:17

## 2018-01-01 RX ADMIN — DOPAMINE HYDROCHLORIDE 30.98 MICROGRAM(S)/KG/MIN: 40 INJECTION, SOLUTION, CONCENTRATE INTRAVENOUS at 05:15

## 2018-01-01 RX ADMIN — SODIUM CHLORIDE 75 MILLILITER(S): 9 INJECTION INTRAMUSCULAR; INTRAVENOUS; SUBCUTANEOUS at 18:01

## 2018-01-01 RX ADMIN — FENTANYL CITRATE 4.13 MICROGRAM(S)/KG/HR: 50 INJECTION INTRAVENOUS at 07:01

## 2018-01-01 RX ADMIN — SODIUM CHLORIDE 75 MILLILITER(S): 9 INJECTION, SOLUTION INTRAVENOUS at 05:26

## 2018-01-01 RX ADMIN — Medication 4 MILLIGRAM(S): at 13:28

## 2018-01-01 RX ADMIN — HYDROMORPHONE HYDROCHLORIDE 1 MILLIGRAM(S): 2 INJECTION INTRAMUSCULAR; INTRAVENOUS; SUBCUTANEOUS at 23:40

## 2018-01-01 RX ADMIN — HEPARIN SODIUM 5000 UNIT(S): 5000 INJECTION INTRAVENOUS; SUBCUTANEOUS at 05:43

## 2018-01-01 RX ADMIN — PIPERACILLIN AND TAZOBACTAM 25 GRAM(S): 4; .5 INJECTION, POWDER, LYOPHILIZED, FOR SOLUTION INTRAVENOUS at 15:24

## 2018-01-01 RX ADMIN — Medication 3: at 19:02

## 2018-01-01 RX ADMIN — Medication 2: at 17:21

## 2018-01-01 RX ADMIN — Medication 4 MILLIGRAM(S): at 23:48

## 2018-01-01 RX ADMIN — PANTOPRAZOLE SODIUM 40 MILLIGRAM(S): 20 TABLET, DELAYED RELEASE ORAL at 12:42

## 2018-01-01 RX ADMIN — SODIUM CHLORIDE 75 MILLILITER(S): 9 INJECTION INTRAMUSCULAR; INTRAVENOUS; SUBCUTANEOUS at 13:24

## 2018-01-01 RX ADMIN — FENTANYL CITRATE 4.13 MICROGRAM(S)/KG/HR: 50 INJECTION INTRAVENOUS at 20:15

## 2018-01-01 RX ADMIN — DOPAMINE HYDROCHLORIDE 30.98 MICROGRAM(S)/KG/MIN: 40 INJECTION, SOLUTION, CONCENTRATE INTRAVENOUS at 10:57

## 2018-01-01 RX ADMIN — OXYCODONE HYDROCHLORIDE 10 MILLIGRAM(S): 5 TABLET ORAL at 17:03

## 2018-01-01 RX ADMIN — SODIUM CHLORIDE 75 MILLILITER(S): 9 INJECTION INTRAMUSCULAR; INTRAVENOUS; SUBCUTANEOUS at 05:41

## 2018-01-01 RX ADMIN — CHLORHEXIDINE GLUCONATE 15 MILLILITER(S): 213 SOLUTION TOPICAL at 17:04

## 2018-01-01 RX ADMIN — Medication 2: at 23:52

## 2018-01-01 RX ADMIN — HEPARIN SODIUM 5000 UNIT(S): 5000 INJECTION INTRAVENOUS; SUBCUTANEOUS at 05:30

## 2018-01-01 RX ADMIN — CHLORHEXIDINE GLUCONATE 15 MILLILITER(S): 213 SOLUTION TOPICAL at 05:42

## 2018-01-01 RX ADMIN — FENTANYL CITRATE 4.13 MICROGRAM(S)/KG/HR: 50 INJECTION INTRAVENOUS at 16:40

## 2018-01-01 RX ADMIN — Medication 100 MILLIGRAM(S): at 11:41

## 2018-01-01 RX ADMIN — Medication 2: at 23:51

## 2018-01-01 RX ADMIN — Medication 4 MILLIGRAM(S): at 05:42

## 2018-01-01 RX ADMIN — HYDROMORPHONE HYDROCHLORIDE 1 MILLIGRAM(S): 2 INJECTION INTRAMUSCULAR; INTRAVENOUS; SUBCUTANEOUS at 21:49

## 2018-01-01 RX ADMIN — FENTANYL CITRATE 4.13 MICROGRAM(S)/KG/HR: 50 INJECTION INTRAVENOUS at 11:49

## 2018-01-01 RX ADMIN — Medication 2: at 23:12

## 2018-01-01 RX ADMIN — DOPAMINE HYDROCHLORIDE 30.98 MICROGRAM(S)/KG/MIN: 40 INJECTION, SOLUTION, CONCENTRATE INTRAVENOUS at 13:49

## 2018-01-01 RX ADMIN — Medication 4 MILLIGRAM(S): at 05:30

## 2018-01-01 RX ADMIN — Medication 2: at 05:44

## 2018-01-01 RX ADMIN — AMLODIPINE BESYLATE 5 MILLIGRAM(S): 2.5 TABLET ORAL at 05:38

## 2018-01-01 RX ADMIN — INSULIN GLARGINE 15 UNIT(S): 100 INJECTION, SOLUTION SUBCUTANEOUS at 21:38

## 2018-01-01 RX ADMIN — OXYCODONE HYDROCHLORIDE 10 MILLIGRAM(S): 5 TABLET ORAL at 13:36

## 2018-01-01 RX ADMIN — Medication 480 MICROGRAM(S): at 11:26

## 2018-01-01 RX ADMIN — Medication 2: at 16:49

## 2018-01-01 RX ADMIN — Medication 2 MILLIGRAM(S): at 19:29

## 2018-01-01 RX ADMIN — SODIUM CHLORIDE 1000 MILLILITER(S): 9 INJECTION INTRAMUSCULAR; INTRAVENOUS; SUBCUTANEOUS at 16:27

## 2018-01-01 RX ADMIN — HYDROMORPHONE HYDROCHLORIDE 1 MILLIGRAM(S): 2 INJECTION INTRAMUSCULAR; INTRAVENOUS; SUBCUTANEOUS at 09:00

## 2018-01-01 RX ADMIN — Medication 2: at 13:26

## 2018-01-01 RX ADMIN — ETOMIDATE 20 MILLIGRAM(S): 2 INJECTION INTRAVENOUS at 11:41

## 2018-01-01 RX ADMIN — Medication 2 MILLIGRAM(S): at 15:24

## 2018-01-01 RX ADMIN — Medication 2 MILLIGRAM(S): at 21:20

## 2018-01-01 RX ADMIN — Medication 650 MILLIGRAM(S): at 22:05

## 2018-01-01 RX ADMIN — HYDROMORPHONE HYDROCHLORIDE 1 MILLIGRAM(S): 2 INJECTION INTRAMUSCULAR; INTRAVENOUS; SUBCUTANEOUS at 00:05

## 2018-01-01 RX ADMIN — Medication 4 MILLIGRAM(S): at 19:26

## 2018-01-01 RX ADMIN — HEPARIN SODIUM 5000 UNIT(S): 5000 INJECTION INTRAVENOUS; SUBCUTANEOUS at 21:37

## 2018-01-01 RX ADMIN — HYDROMORPHONE HYDROCHLORIDE 1 MILLIGRAM(S): 2 INJECTION INTRAMUSCULAR; INTRAVENOUS; SUBCUTANEOUS at 08:48

## 2018-01-01 RX ADMIN — OXYCODONE HYDROCHLORIDE 10 MILLIGRAM(S): 5 TABLET ORAL at 05:28

## 2018-01-01 RX ADMIN — PANTOPRAZOLE SODIUM 40 MILLIGRAM(S): 20 TABLET, DELAYED RELEASE ORAL at 11:26

## 2018-01-01 RX ADMIN — INSULIN GLARGINE 5 UNIT(S): 100 INJECTION, SOLUTION SUBCUTANEOUS at 08:59

## 2018-01-01 RX ADMIN — LOSARTAN POTASSIUM 100 MILLIGRAM(S): 100 TABLET, FILM COATED ORAL at 16:58

## 2018-01-01 RX ADMIN — HEPARIN SODIUM 5000 UNIT(S): 5000 INJECTION INTRAVENOUS; SUBCUTANEOUS at 13:23

## 2018-01-01 RX ADMIN — Medication 4 MILLIGRAM(S): at 06:09

## 2018-01-01 RX ADMIN — HYDROMORPHONE HYDROCHLORIDE 1 MILLIGRAM(S): 2 INJECTION INTRAMUSCULAR; INTRAVENOUS; SUBCUTANEOUS at 17:29

## 2018-01-01 RX ADMIN — PIPERACILLIN AND TAZOBACTAM 25 GRAM(S): 4; .5 INJECTION, POWDER, LYOPHILIZED, FOR SOLUTION INTRAVENOUS at 05:12

## 2018-01-01 RX ADMIN — ATORVASTATIN CALCIUM 20 MILLIGRAM(S): 80 TABLET, FILM COATED ORAL at 21:38

## 2018-01-01 RX ADMIN — POLYETHYLENE GLYCOL 3350 17 GRAM(S): 17 POWDER, FOR SOLUTION ORAL at 13:39

## 2018-01-01 RX ADMIN — Medication 2: at 12:50

## 2018-01-01 RX ADMIN — INSULIN GLARGINE 10 UNIT(S): 100 INJECTION, SOLUTION SUBCUTANEOUS at 21:38

## 2018-01-01 RX ADMIN — Medication 6 UNIT(S): at 21:32

## 2018-01-01 RX ADMIN — Medication 480 MICROGRAM(S): at 12:59

## 2018-01-01 RX ADMIN — HYDROMORPHONE HYDROCHLORIDE 1 MILLIGRAM(S): 2 INJECTION INTRAMUSCULAR; INTRAVENOUS; SUBCUTANEOUS at 05:30

## 2018-01-01 RX ADMIN — HYDROMORPHONE HYDROCHLORIDE 1 MILLIGRAM(S): 2 INJECTION INTRAMUSCULAR; INTRAVENOUS; SUBCUTANEOUS at 16:26

## 2018-01-01 RX ADMIN — Medication 4 MILLIGRAM(S): at 17:02

## 2018-10-08 NOTE — CONSULT NOTE ADULT - SUBJECTIVE AND OBJECTIVE BOX
INPATIENT HEMATOLOGY ONCOLOGY CONSULTATION  [facility]  REQUESTING PHYSICIAN: Dr. 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 Patient had extensive systemic imaging as an outpatient including CT chest abdomen  pelvis which were also notable for a pleuralbased lesion in the right lower lobe as well as a right acromion lesion. Patient also underwent  PET/CT on October 5 which revealed a FDG avid right upper lobe lung mass as well as multiple FDG avid lytic osseous lesions with soft  tissue component whereby the differential was suggestive of primary lung malignancy. Over the course of the last week he has been  having worsening neck pain and the patient was initially written for oxycodone 5 mg subsequently upgraded to 10 mg every 6 hours as  needed with no significant relief. Patient also complained of constipation and was written for bowel regimen. Patient developed copious  amounts of diarrhea and was subsequently dehydrated.  Patient presents to [facility] today for worsening neck pain as well as dehydration decreased p.o. intake and generalized  weakness.  ROS: + pain localized to RIght cervical region  Constitutional: Alert, cooperative, oriented. No acute distress  Eyes: Conjunctivae and sclerae are clear and without icterus.  ENMT: Neck Pain  Lymph: No easy bruising or bleeding. The patient denies any tender or palpable lymph nodes.  Respiratory: No dyspnea on exertion, chest pain, cough or hemoptysis.  Cardiovascular: No anginal chest pain, palpitations or orthopnea  Gastrointestinal: No nausea, vomiting, diarrhea, GI bleeding, or constipation. No change in bowel habits, no heartburn or early satiety.  Extremities: No lower extremity edema      LAST TREATMENT: N/A  PAST MEDICAL HISTORY: Type II DM, HTN,  PAST SURGICAL HISTORY: none  SOCIAL HISTORY: Former smoker, No ETOH      CURRENT MEDICATIONS:    Hydrochlorothiazide 12.5 mg every morning  Metformin 1000 mg every afternoon  Amoxicillin 1 tab twice daily  Protonix 40 mg p.o. daily  Prednisone 50 mg p.o. daily  Oxycodone 5 mg every 4 hours as needed pain  Losartan 100 mg p.o. daily  Amlodipine 5 mg p.o. daily  Docusate 200 mg p.o. twice daily  Atorvastatin 20 mg p.o. nightly  Lactulose  FAMILY HISTORY: No pertinent family history of cancer  PHYSICAL EXAMINATION:  GENERAL APPEARANCE: Well developed, well nourished, uncomfortable  SKIN: Inspection of the skin reveals no rashes, ulcerations or petechiae.  HEENT:  NECK: Patient in soft ccollar  CHEST: Normal AP diameter and normal contour without any kyphoscoliosis.  LUNGS: Auscultation of the lungs revealed normal breath sounds without any other adventitious sounds or rubs.  CARDIOVASCULAR: There was a regular rate and rhythm without any murmurs, gallops, rubs.   ABDOMEN: Soft and nontender with normal bowel sounds.  MUSCULOSKELETAL:. There was no tenderness or effusions noted. Muscle strength and tone were normal.  EXTREMITIES: No cyanosis, clubbing or edema.  NEUROLOGIC: Alert and oriented x 3. Normal affect.   LABS:     CBC Full  -  ( 07 Oct 2018 15:45 )  WBC Count : 11.93 K/uL  Hemoglobin : 14.2 g/dL  Hematocrit : 42.7 %  Platelet Count - Automated : 314 K/uL  Mean Cell Volume : 94.7 fl  Mean Cell Hemoglobin : 31.5 pg  Mean Cell Hemoglobin Concentration : 33.3 gm/dL  Auto Neutrophil # : 9.98 K/uL  Auto Lymphocyte # : 0.83 K/uL  Auto Monocyte # : 0.75 K/uL  Auto Eosinophil # : 0.11 K/uL  Auto Basophil # : 0.04 K/uL  Auto Neutrophil % : 83.7 %  Auto Lymphocyte % : 7.0 %  Auto Monocyte % : 6.3 %  Auto Eosinophil % : 0.9 %  Auto Basophil % : 0.3 %  10-07    139  |  101  |  36<H>  ----------------------------<  203<H>  3.9   |  26  |  1.30    Ca    9.3      07 Oct 2018 15:45    TPro  6.7  /  Alb  3.2<L>  /  TBili  0.6  /  DBili  x   /  AST  16  /  ALT  18  /  AlkPhos  92  10-07    IMAGING:  MRI brain: 10/3/2018 mild small vessel ischemic disease without evidence of acute ischemia or abnormal enhancement  PET/CT performed on 10/4/2018:  Lytic lesion with soft tissue component in the C1 vertebral body on the right with peripheral FDG avidity measuring 3.2 x 3.2 cm SUV of 3.0  Expansile lytic lesion in the right acromion with soft tissue component FDG avid 3.9 measuring 6.0 x 4.4cm destructive lesion in the right  lateral fifth rib with associated soft tissue component SUV of 1.9 FDG avid soft tissue lesion in the T9 vertebral body on the right anteriorly  measuring 1.8 x 1.7 SUV of 2.6  Pleuralbased  mass in the medial right lower lobe FDG avid SUV of 3.2 measuring 3.1 x 3.0 cm  Right lower paratracheal lymph node 1.8 x 0.9 cm

## 2018-10-08 NOTE — CONSULT NOTE ADULT - ADDITIONAL PE
Patient seen at Bedside. Sitting up, awake and alert, in no acute distress, eating a Ham sandwich. Able to move his head. Upper extremity mobility normal ( eating a sandwich), moving legs, without complain of weakness, pain or parasthesia

## 2018-10-08 NOTE — CONSULT NOTE ADULT - SUBJECTIVE AND OBJECTIVE BOX
This patient was being managed as an outpatient. He was known to have a large lesion at C1. Biopsy was pending as well as XRT to the neck. His pain increased over the weekend and he was admitted. I am asked to see the patient to start RT to the neck. 

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
## 2018-10-09 NOTE — PROGRESS NOTE ADULT - SUBJECTIVE AND OBJECTIVE BOX
Chief Complaint: Neck Pain    History: Patient admitted with severe neck pain. Underwent work-up as outpatient which demonstrated destructive lesion involving the right lateral mass of C1 as well as lung mass. Recent MRI of cervical and thoracic spine again demonstrated destructive lesion involving C1 as well as destructive process involving T9. MRI reviewed with radiology who feels MRI findings may represent infection/abcess.    OBJECTIVE:     Vital Signs Last 24 Hrs  T(C): 36.3 (09 Oct 2018 04:03), Max: 36.8 (08 Oct 2018 20:29)  T(F): 97.4 (09 Oct 2018 04:03), Max: 98.2 (08 Oct 2018 20:29)  HR: 68 (09 Oct 2018 04:03) (68 - 83)  BP: 138/82 (09 Oct 2018 04:03) (129/73 - 138/82)  BP(mean): --  RR: 18 (09 Oct 2018 04:03) (18 - 18)  SpO2: 96% (09 Oct 2018 04:03) (96% - 96%)    Physical Exam:         Awake and alert         HEENT - unremarkable         Neck - supple, tenderness along the right paraspinal region, ROM associated with pain.         Bilateral Upper Extremities:             Good Motor Strength             Sensation intact         Bilateral Lower Extremities             Good Motor Strength             Senation intact             I&O's Detail    08 Oct 2018 07:01  -  09 Oct 2018 07:00  --------------------------------------------------------  IN:    sodium chloride 0.9%.: 1000 mL  Total IN: 1000 mL    OUT:    Voided: 2300 mL  Total OUT: 2300 mL    Total NET: -1300 mL          LABS:                        11.2   7.50  )-----------( 242      ( 09 Oct 2018 05:30 )             33.0     10-09    142  |  106  |  26<H>  ----------------------------<  212<H>  4.0   |  26  |  0.78    Ca    8.6      09 Oct 2018 05:30    TPro  6.7  /  Alb  3.2<L>  /  TBili  0.6  /  DBili  x   /  AST  16  /  ALT  18  /  AlkPhos  92  10-07    PT/INR - ( 08 Oct 2018 12:20 )   PT: 11.1 sec;   INR: 1.03 ratio         PTT - ( 08 Oct 2018 12:20 )  PTT:25.8 sec      RADIOLOGY & ADDITIONAL STUDIES: MRI Cervical Spine - destructive lesion involving the right lateral mass of C1. No cord compression. Tumor vs infection. MRI of the thoracic spine - demonstrates destructive process of T9 suggestive of osteomyelitis.      A/P :  78y Male with severe neck pain, Destructive lesion C1 and T9, Tumor vs infection  -   Patient requires additional work-up prior to surgery. Agree with ID evaluation. Will follow up ESR and blood cultures. Would like to obtain Gallium scan to further evaluate spine for the possibility of infection/abscess. Spoke with Nuclear Medicine Department. Patient van be injected tomorrow and then scanned 48 hours later. Gallium Scan results would not be available until late in day on Friday. Therefore, surgery can not be performed on Thursday and will have to be re-scheduled after all work-up is completed.  -   Continue pain management.  -    Continue present treatment

## 2018-10-09 NOTE — PROGRESS NOTE ADULT - SUBJECTIVE AND OBJECTIVE BOX
This is a 78year old gentleman with past medical history significant for hypertension type 2 diabetes noninsulin dependent recently seen by Genaro Hackett in the office after worsening neck pain and radiographic findings consistent with an expansile destructive lesion involving the C1 mass. Patient had extensive systemic imaging as an outpatient including CT chest abdomen  pelvis which were also notable for a pleural based lesion in the right lower lobe as well as a right acromion lesion. Patient also underwent PET/CT on October 5 which revealed a FDG avid right upper lobe lung mass as well as multiple FDG avid lytic osseous lesions with soft tissue component whereby the differential was suggestive of primary lung malignancy. Over the course of the last week he has been having worsening neck pain and the patient was initially written for oxycodone 5 mg subsequently upgraded to 10 mg every 6 hours as  needed with no significant relief. Patient also complained of constipation and was written for bowel regimen. Patient developed copious amounts of diarrhea and was subsequently dehydrated. Patient presented to [facility] for worsening neck pain as well as dehydration decreased p.o. intake and generalized  weakness.    MEDICATIONS  (STANDING):  amLODIPine   Tablet 5 milliGRAM(s) Oral daily  atorvastatin 20 milliGRAM(s) Oral at bedtime  dexamethasone  Injectable 4 milliGRAM(s) IV Push every 8 hours  dextrose 5%. 1000 milliLiter(s) (50 mL/Hr) IV Continuous <Continuous>  dextrose 50% Injectable 12.5 Gram(s) IV Push once  dextrose 50% Injectable 25 Gram(s) IV Push once  dextrose 50% Injectable 25 Gram(s) IV Push once  heparin  Injectable 5000 Unit(s) SubCutaneous every 8 hours  insulin glargine Injectable (LANTUS) 5 Unit(s) SubCutaneous at bedtime  insulin lispro (HumaLOG) corrective regimen sliding scale   SubCutaneous at bedtime  insulin lispro (HumaLOG) corrective regimen sliding scale   SubCutaneous three times a day before meals  losartan 100 milliGRAM(s) Oral daily  sodium chloride 0.9%. 1000 milliLiter(s) (75 mL/Hr) IV Continuous <Continuous>    MEDICATIONS  (PRN):  acetaminophen   Tablet .. 650 milliGRAM(s) Oral every 6 hours PRN Temp greater or equal to 38C (100.4F), Mild Pain (1 - 3)  dextrose 40% Gel 15 Gram(s) Oral once PRN Blood Glucose LESS THAN 70 milliGRAM(s)/deciliter  glucagon  Injectable 1 milliGRAM(s) IntraMuscular once PRN Glucose LESS THAN 70 milligrams/deciliter  HYDROmorphone  Injectable 1 milliGRAM(s) IV Push every 4 hours PRN Severe Pain (7 - 10)  oxyCODONE    IR 10 milliGRAM(s) Oral every 4 hours PRN Moderate Pain (4 - 6)  oxyCODONE    IR 5 milliGRAM(s) Oral every 4 hours PRN Mild Pain (1 - 3)    Allergies    No Known Allergies    Intolerances    PE:    Vital Signs Last 24 Hrs  T(C): 36.3 (09 Oct 2018 04:03), Max: 36.8 (08 Oct 2018 20:29)  T(F): 97.4 (09 Oct 2018 04:03), Max: 98.2 (08 Oct 2018 20:29)  HR: 68 (09 Oct 2018 04:03) (68 - 83)  BP: 138/82 (09 Oct 2018 04:03) (129/73 - 138/82)  BP(mean): --  RR: 18 (09 Oct 2018 04:03) (18 - 18)  SpO2: 96% (09 Oct 2018 04:03) (96% - 96%)    Patient laying comfortably in bed-admits to marked R neck pain when trying to sit or stand.  Marked pain with AROM/PROM cervical spine  No motor deficits UE/LEs  Negative Allan's/clonus    MRI cervical spine with enhancing irregular destructive lesion R C1 arch which may represent an abscess rather than metastasis  MRI thoracic spine with enhancing irregular lesion and increased signal changes anterior body T9. This is a 78year old gentleman with past medical history significant for hypertension type 2 diabetes noninsulin dependent recently seen by Genaro Hackett in the office after worsening neck pain and radiographic findings consistent with an expansile destructive lesion involving the C1 mass. Patient had extensive systemic imaging as an outpatient including CT chest abdomen  pelvis which were also notable for a pleural based lesion in the right lower lobe as well as a right acromion lesion. Patient also underwent PET/CT on October 5 which revealed a FDG avid right upper lobe lung mass as well as multiple FDG avid lytic osseous lesions with soft tissue component whereby the differential was suggestive of primary lung malignancy. Over the course of the last week he has been having worsening neck pain and the patient was initially written for oxycodone 5 mg subsequently upgraded to 10 mg every 6 hours as  needed with no significant relief. Patient also complained of constipation and was written for bowel regimen. Patient developed copious amounts of diarrhea and was subsequently dehydrated. Patient presented to [facility] for worsening neck pain as well as dehydration decreased p.o. intake and generalized  weakness.    MEDICATIONS  (STANDING):  amLODIPine   Tablet 5 milliGRAM(s) Oral daily  atorvastatin 20 milliGRAM(s) Oral at bedtime  dexamethasone  Injectable 4 milliGRAM(s) IV Push every 8 hours  dextrose 5%. 1000 milliLiter(s) (50 mL/Hr) IV Continuous <Continuous>  dextrose 50% Injectable 12.5 Gram(s) IV Push once  dextrose 50% Injectable 25 Gram(s) IV Push once  dextrose 50% Injectable 25 Gram(s) IV Push once  heparin  Injectable 5000 Unit(s) SubCutaneous every 8 hours  insulin glargine Injectable (LANTUS) 5 Unit(s) SubCutaneous at bedtime  insulin lispro (HumaLOG) corrective regimen sliding scale   SubCutaneous at bedtime  insulin lispro (HumaLOG) corrective regimen sliding scale   SubCutaneous three times a day before meals  losartan 100 milliGRAM(s) Oral daily  sodium chloride 0.9%. 1000 milliLiter(s) (75 mL/Hr) IV Continuous <Continuous>    MEDICATIONS  (PRN):  acetaminophen   Tablet .. 650 milliGRAM(s) Oral every 6 hours PRN Temp greater or equal to 38C (100.4F), Mild Pain (1 - 3)  dextrose 40% Gel 15 Gram(s) Oral once PRN Blood Glucose LESS THAN 70 milliGRAM(s)/deciliter  glucagon  Injectable 1 milliGRAM(s) IntraMuscular once PRN Glucose LESS THAN 70 milligrams/deciliter  HYDROmorphone  Injectable 1 milliGRAM(s) IV Push every 4 hours PRN Severe Pain (7 - 10)  oxyCODONE    IR 10 milliGRAM(s) Oral every 4 hours PRN Moderate Pain (4 - 6)  oxyCODONE    IR 5 milliGRAM(s) Oral every 4 hours PRN Mild Pain (1 - 3)    Allergies    No Known Allergies    Intolerances    PE:    Vital Signs Last 24 Hrs  T(C): 36.3 (09 Oct 2018 04:03), Max: 36.8 (08 Oct 2018 20:29)  T(F): 97.4 (09 Oct 2018 04:03), Max: 98.2 (08 Oct 2018 20:29)  HR: 68 (09 Oct 2018 04:03) (68 - 83)  BP: 138/82 (09 Oct 2018 04:03) (129/73 - 138/82)  BP(mean): --  RR: 18 (09 Oct 2018 04:03) (18 - 18)  SpO2: 96% (09 Oct 2018 04:03) (96% - 96%)    Patient laying comfortably in bed-admits to marked R neck pain when trying to sit or stand.  Marked pain with AROM/PROM cervical spine  No motor deficits UE/LEs  Negative Allan's/clonus    MRI cervical spine with enhancing irregular destructive lesion R C1 arch which may represent an abscess rather than metastasis, multilevel spondylosis with myelomalacia of L side of cord at C3/4 with central stenosis  MRI thoracic spine with enhancing irregular lesion and increased signal changes anterior body T9.

## 2018-10-09 NOTE — PROGRESS NOTE ADULT - SUBJECTIVE AND OBJECTIVE BOX
Pt seen and examined, case d/w Mariangel Rosales and Vesta.  Pt is c/o neck pain, better controlled on current regimen, diarrhea resolved.      Date of Service: 10-09-18 @ 09:39    Vital Signs Last 24 Hrs  T(C): 36.3 (09 Oct 2018 04:03), Max: 36.8 (08 Oct 2018 20:29)  T(F): 97.4 (09 Oct 2018 04:03), Max: 98.2 (08 Oct 2018 20:29)  HR: 68 (09 Oct 2018 04:03) (68 - 83)  BP: 138/82 (09 Oct 2018 04:03) (129/73 - 138/82)  BP(mean): --  RR: 18 (09 Oct 2018 04:03) (18 - 18)  SpO2: 96% (09 Oct 2018 04:03) (96% - 96%)    Daily     Daily     I&O's Detail    08 Oct 2018 07:01  -  09 Oct 2018 07:00  --------------------------------------------------------  IN:    sodium chloride 0.9%.: 1000 mL  Total IN: 1000 mL    OUT:    Voided: 2300 mL  Total OUT: 2300 mL    Total NET: -1300 mL          CAPILLARY BLOOD GLUCOSE      POCT Blood Glucose.: 221 mg/dL (09 Oct 2018 07:57)  POCT Blood Glucose.: 322 mg/dL (08 Oct 2018 21:07)            EKG-nsr with PAC, non-specific STT wave changes                           11.2   7.50  )-----------( 242      ( 09 Oct 2018 05:30 )             33.0       10-09    142  |  106  |  26<H>  ----------------------------<  212<H>  4.0   |  26  |  0.78    Ca    8.6      09 Oct 2018 05:30    TPro  6.7  /  Alb  3.2<L>  /  TBili  0.6  /  DBili  x   /  AST  16  /  ALT  18  /  AlkPhos  92  10-07      PT/INR - ( 08 Oct 2018 12:20 )   PT: 11.1 sec;   INR: 1.03 ratio         PTT - ( 08 Oct 2018 12:20 )  PTT:25.8 sec    LIVER FUNCTIONS - ( 07 Oct 2018 15:45 )  Alb: 3.2 g/dL / Pro: 6.7 gm/dL / ALK PHOS: 92 U/L / ALT: 18 U/L / AST: 16 U/L / GGT: x                     MEDICATIONS  (STANDING):  amLODIPine   Tablet 5 milliGRAM(s) Oral daily  atorvastatin 20 milliGRAM(s) Oral at bedtime  dexamethasone  Injectable 4 milliGRAM(s) IV Push every 8 hours  dextrose 5%. 1000 milliLiter(s) (50 mL/Hr) IV Continuous <Continuous>  dextrose 50% Injectable 12.5 Gram(s) IV Push once  dextrose 50% Injectable 25 Gram(s) IV Push once  dextrose 50% Injectable 25 Gram(s) IV Push once  heparin  Injectable 5000 Unit(s) SubCutaneous every 8 hours  insulin lispro (HumaLOG) corrective regimen sliding scale   SubCutaneous three times a day before meals  losartan 100 milliGRAM(s) Oral daily  sodium chloride 0.9%. 1000 milliLiter(s) (75 mL/Hr) IV Continuous <Continuous>    MEDICATIONS  (PRN):  acetaminophen   Tablet .. 650 milliGRAM(s) Oral every 6 hours PRN Temp greater or equal to 38C (100.4F), Mild Pain (1 - 3)  dextrose 40% Gel 15 Gram(s) Oral once PRN Blood Glucose LESS THAN 70 milliGRAM(s)/deciliter  glucagon  Injectable 1 milliGRAM(s) IntraMuscular once PRN Glucose LESS THAN 70 milligrams/deciliter  HYDROmorphone  Injectable 1 milliGRAM(s) IV Push every 4 hours PRN Severe Pain (7 - 10)  oxyCODONE    IR 10 milliGRAM(s) Oral every 4 hours PRN Moderate Pain (4 - 6)  oxyCODONE    IR 5 milliGRAM(s) Oral every 4 hours PRN Mild Pain (1 - 3)
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
## 2018-10-09 NOTE — PROGRESS NOTE ADULT - ASSESSMENT
This is a 79yearold gentleman with history of hypertension diabetes recently found to have an expansile lesion at the T1 level admitted for intractable cervical pain.    MRI report now suggestive of necrotic lesion at C1 still unclear if infectious or malignant.   discussed with Dr. Hernandez earlier today and patient planned for Surgery on thurs   continue with dexamethasone   will await biopsy results.   diagnosis favoring pulmonary primary due to pleural based lesion  Case discussed with Dr. Rao   continue with current pain regimen.   Radiation oncology consult appreciated

## 2018-10-09 NOTE — PROGRESS NOTE ADULT - ASSESSMENT
This is a 78year old gentleman with past medical history significant for hypertension type 2 diabetes noninsulin dependent recently seen by Genaro Hackett in the office after worsening neck pain and radiographic findings consistent with an expansile destructive lesion involving the C1 mass. Patient had extensive systemic imaging as an outpatient including CT chest abdomen  pelvis which were also notable for a pleural based lesion in the right lower lobe as well as a right acromion lesion. Patient also underwent PET/CT on October 5 which revealed a FDG avid right upper lobe lung mass as well as multiple FDG avid lytic osseous lesions with soft tissue component whereby the differential was suggestive of primary lung malignancy. Over the course of the last week he has been having worsening neck pain and the patient was initially written for oxycodone 5 mg subsequently upgraded to 10 mg every 6 hours as  needed with no significant relief. Patient also complained of constipation and was written for bowel regimen. Patient developed copious amounts of diarrhea and was subsequently dehydrated. Patient presented to [facility] for worsening neck pain as well as dehydration decreased p.o. intake and generalized  weakness.    IMP:  Possible C1 and T9 abscess-  Underlying Lung mass c/w neoplasm and possible metastases    Plan:  Work up initiated for sepsis  Echo  ID consult  CT scan cervical and thoracic spine  Patient will still require stabilization of C1 due to destructive nature of current process whether tumor or infection.  Case discussed with Dr. Jaime, Dr. Rao, Dr. Rosales, Dr. Hernandez This is a 78year old gentleman with past medical history significant for hypertension type 2 diabetes noninsulin dependent recently seen by Genaro Hackett in the office after worsening neck pain and radiographic findings consistent with an expansile destructive lesion involving the C1 mass. Patient had extensive systemic imaging as an outpatient including CT chest abdomen  pelvis which were also notable for a pleural based lesion in the right lower lobe as well as a right acromion lesion. Patient also underwent PET/CT on October 5 which revealed a FDG avid right upper lobe lung mass as well as multiple FDG avid lytic osseous lesions with soft tissue component whereby the differential was suggestive of primary lung malignancy. Over the course of the last week he has been having worsening neck pain and the patient was initially written for oxycodone 5 mg subsequently upgraded to 10 mg every 6 hours as  needed with no significant relief. Patient also complained of constipation and was written for bowel regimen. Patient developed copious amounts of diarrhea and was subsequently dehydrated. Patient presented to [facility] for worsening neck pain as well as dehydration decreased p.o. intake and generalized  weakness.    IMP:  Possible C1 and T9 abscess-  Underlying Lung mass c/w neoplasm and possible metastases  Cervical DDD with myelomalacia C3/4 without evidence of myelopathy    Plan:  Work up initiated for sepsis  Echo  ID consult  CT scan cervical and thoracic spine  Patient will still require stabilization of C1 due to destructive nature of current process whether tumor or infection.  Case discussed with Dr. Jaime, Dr. Rao, Dr. Rosales, Dr. Hernandez

## 2018-10-09 NOTE — PROGRESS NOTE ADULT - ASSESSMENT
Pt is a 77 y/o female ex-smoker (smoked 1.5 ppd for many years, quit 30 years ago), HTN, type 2 diabetes, hyperlipidemia who was recently diagnosed with RLL pleural based mass along with C1 expansile mass.  Due to increasing pain he was admitted for pain control and further evaluation of his presumed metastatic disease.     * C1 Expansile Mass-concern for metastatic lung CA vs cancer.  Spine f/u, will need surgery, continue steroids.  No neurological deficits at this time, RT input noted.  Cards preop evaluation, order echo, continue pain control, bowel regimen, heme-onc following,   * HTN-stable, continue Norvasc HCTZ on hold  * Type 2 Diabetes-hold metformin, add low dose Lantus adjust ISS, titrate further as needed.  * Hyperlipidemia-continue atorvastatin  * GERD-continue Protonix  * Proph- heparin   * Disp-OOB, ambulate  * Comm-d/w pt, RN, Dr Rosales, Enoc Torres, left message for son Pt is a 77 y/o female ex-smoker (smoked 1.5 ppd for many years, quit 30 years ago), HTN, type 2 diabetes, hyperlipidemia who was recently diagnosed with RLL pleural based mass along with C1 expansile mass.  Due to increasing pain he was admitted for pain control and further evaluation of his presumed metastatic disease.     * C1 Expansile Mass-concern for metastatic lung CA vs cancer.  Spine f/u, will need surgery, continue steroids, ESR, CRP, echo, ID eval.  No neurological deficits at this time, RT input noted, no need for urgent RT at this time.  Cards preop evaluation, order echo, continue pain control, bowel regimen, heme-onc following.    * HTN-stable, continue Norvasc HCTZ on hold  * Type 2 Diabetes-hold metformin, add low dose Lantus adjust ISS, titrate further as needed.  * Hyperlipidemia-continue atorvastatin  * GERD-continue Protonix  * Proph- heparin   * Disp-OOB, ambulate  * Comm-d/w pt, RN, Dr Rosales, Enoc Torres, left message for son Pt is a 77 y/o female ex-smoker (smoked 1.5 ppd for many years, quit 30 years ago), HTN, type 2 diabetes, hyperlipidemia who was recently diagnosed with RLL pleural based mass along with C1 expansile mass.  Due to increasing pain he was admitted for pain control and further evaluation of his presumed metastatic disease.     * C1 Expansile Mass-concern for metastatic lung CA vs cancer.  Spine f/u, will need surgery, continue steroids, ESR, CRP, echo, ID eval.  No neurological deficits at this time, RT input noted, no need for urgent RT at this time.  Cards preop evaluation, order echo, continue pain control, bowel regimen, heme-onc following.    * HTN-stable, continue Norvasc, losartan   * Type 2 Diabetes-hold metformin, add low dose Lantus adjust ISS, titrate further as needed.  * Hyperlipidemia-continue atorvastatin  * GERD-continue Protonix  * Proph- heparin   * Disp-OOB, ambulate  * Comm-d/w pt, RN, Dr Rosales, Enoc Torres, left message for son

## 2018-10-10 NOTE — CONSULT NOTE ADULT - ASSESSMENT
C1 lesion Infective vs metastatic  No known CAD but high risk  HTN  DM  Obesity  Ex smoker    Suggest:    Get echo  No angina. Stress test is not absolutely necessary but may help. Will discuss with nuclear medicine reg timing of nuclear stress test once gallium scan is done.   Meanwhile continue current treatment, optimize BP  Add low dose beta blockers if HR permits.
IMPRESSION: This is a 77yearold gentleman with history of hypertension diabetes recently found to have an expansile lesion at the T1 level admitted for intractable cervical pain.  Neuro: C1 mass  Radiation Consult  Continue  with Corticosteroids  Will  perform MRI of C and T spine with / Wo contrast  Ortho  spine consult López Roman  pain  control  continue with 10mg Oxyco  Intervention  radiology consult for biopsy of C1 mass  Cardio: Hypertension  continue wtih ARB and CCB  hold diurectics  ENDO: DM II  Check Hb A1c  hold metformin  RISS  DVT: lovenox
Patient is 78 year old male with past medical history hypertension, hyperlipidemia, diabetes admitted on 10/7 for evaluation of neck pain mostly on right side of neck that began on 9/10 while on a trip to [facility] for which he was not even able to get out of bed. The patient denies fever, chills or any recent sick contacts; no recent dental work. No visual problems, is able to bend his neck and move in many motions.  1. Patient admitted with multiple lesions of the spine, possibly a necrotic lesion at C1 which may represent an abscess  - blood cultures have been ordered  - will need echocardiogram  - iv hydration and supportive care   - serial cbc and monitor temperature   - reviewed prior medical records to evaluate for resistant or atypical pathogens   - will continue off antibiotics until aspirate or OR cultures have been obtained  2. other issues: hypertension, hyperlipidemia, diabetes  - per medicine
PACs and [employer] EMR systems were down at time of consult, so I cannot view any images. [employer] also will not load with patient's name and  supplied.  I discussed the case with Dr. Hernandez, who stated he was able to view images and who thought the C1 lesion was unstable. Outpatient opinion from another neurosurgeon said the opposite. We are waiting for spine surgery to complete evaluation. He was unaware of need for tissue or biopsy.    At this point the patient is stable and comfortable. Should the need arise for urgent RT, My associates are aware of the case and we can establish transfer, or discharge and treatment from home.
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
## 2018-10-10 NOTE — PROGRESS NOTE ADULT - ASSESSMENT
Pt is a 79 y/o female ex-smoker (smoked 1.5 ppd for many years, quit 30 years ago), HTN, type 2 diabetes, hyperlipidemia who was recently diagnosed with RLL pleural based mass along with C1 expansile mass.  Due to increasing pain he was admitted for pain control and further evaluation of his presumed metastatic disease.     * C1 Expansile Mass-concern for metastatic lung CA vs cancer.  Spine f/u noted, will need surgery which is postponed until after gallium scan, continue steroids, ESR, CRP low therefore less likely infectious, f/u echo and cults, ID eval noted  No neurological deficits at this time, RT input noted, no need for urgent RT at this time.  Cards preop evaluation, await echo, continue pain control, bowel regimen, heme-onc following.    * HTN-stable, continue Norvasc, losartan   * Scant Rectal Bleeding-? hemorrhoids, monitor for now if returns than consider GI eval.    * Type 2 Diabetes-uncontrolled due to steroids, continue to hold metformin, inc Lantus, continue ISS and titrate further as needed.  * Hyperlipidemia-continue atorvastatin  * GERD-continue Protonix  * Proph- heparin   * Disp-OOB, ambulate  * Comm- d/w pt, RN, Dr Caputo, Dr Hernandez (yesterday)

## 2018-10-10 NOTE — PROGRESS NOTE ADULT - ASSESSMENT
Patient is 78 year old male with past medical history hypertension, hyperlipidemia, diabetes admitted on 10/7 for evaluation of neck pain mostly on right side of neck that began on 9/10 while on a trip to [facility] for which he was not even able to get out of bed. The patient denies fever, chills or any recent sick contacts; no recent dental work. No visual problems, is able to bend his neck and move in many motions.  1. Patient admitted with multiple lesions of the spine, possibly a necrotic lesion at C1 which may represent an abscess  - blood cultures have been ordered  - will need echocardiogram  - iv hydration and supportive care   - serial cbc and monitor temperature   - reviewed prior medical records to evaluate for resistant or atypical pathogens   - will continue off antibiotics until aspirate or OR cultures have been obtained  - ortho spine has gallium scan planned, therefore surgery will be delayed pending this imaging  2. other issues: hypertension, hyperlipidemia, diabetes  - per medicine
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
## 2018-10-10 NOTE — PROGRESS NOTE ADULT - ASSESSMENT
This is a 77yearold gentleman with history of hypertension diabetes recently found to have an expansile lesion at the T1 level admitted for intractable cervical pain.    MRI report now suggestive of necrotic lesion at C1 still unclear if infectious or malignant.   As per ortho spine, patient is now pending gallium scan prior to biopsy. Image will take place after 48 hours aftter  ID consult appreciated , ESR 24  planned for 2D echo  continue with dexamethasone   will await biopsy results.   diagnosis favoring pulmonary primary due to pleural based lesion on CT  continue with current pain regimen.   Radiation oncology consult appreciated

## 2018-10-10 NOTE — PROGRESS NOTE ADULT - SUBJECTIVE AND OBJECTIVE BOX
Chief Complaint: Neck Pain    History: Patient admitted with severe neck pain. Underwent work-up as outpatient which demonstrated destructive lesion involving the right lateral mass of C1 as well as lung mass. Recent MRI of cervical and thoracic spine again demonstrated destructive lesion involving C1 as well as destructive process involving T9. MRI reviewed with radiology who feels MRI findings may represent infection/abcess.    10/10/18 Patient with persistent R sided neck pain. No radicular pain. Denies history of previous radicular pain    MEDICATIONS  (STANDING):  amLODIPine   Tablet 5 milliGRAM(s) Oral daily  atorvastatin 20 milliGRAM(s) Oral at bedtime  dexamethasone  Injectable 4 milliGRAM(s) IV Push every 8 hours  dextrose 5%. 1000 milliLiter(s) (50 mL/Hr) IV Continuous <Continuous>  dextrose 50% Injectable 12.5 Gram(s) IV Push once  dextrose 50% Injectable 25 Gram(s) IV Push once  dextrose 50% Injectable 25 Gram(s) IV Push once  docusate sodium 100 milliGRAM(s) Oral two times a day  heparin  Injectable 5000 Unit(s) SubCutaneous every 8 hours  insulin glargine Injectable (LANTUS) 15 Unit(s) SubCutaneous at bedtime  insulin lispro (HumaLOG) corrective regimen sliding scale   SubCutaneous at bedtime  insulin lispro (HumaLOG) corrective regimen sliding scale   SubCutaneous three times a day before meals  losartan 100 milliGRAM(s) Oral daily  pantoprazole    Tablet 40 milliGRAM(s) Oral before breakfast  polyethylene glycol 3350 17 Gram(s) Oral daily  sodium chloride 0.9%. 1000 milliLiter(s) (75 mL/Hr) IV Continuous <Continuous>    MEDICATIONS  (PRN):  acetaminophen   Tablet .. 650 milliGRAM(s) Oral every 6 hours PRN Temp greater or equal to 38C (100.4F), Mild Pain (1 - 3)  dextrose 40% Gel 15 Gram(s) Oral once PRN Blood Glucose LESS THAN 70 milliGRAM(s)/deciliter  glucagon  Injectable 1 milliGRAM(s) IntraMuscular once PRN Glucose LESS THAN 70 milligrams/deciliter  HYDROmorphone  Injectable 1 milliGRAM(s) IV Push every 4 hours PRN Severe Pain (7 - 10)  oxyCODONE    IR 10 milliGRAM(s) Oral every 4 hours PRN Moderate Pain (4 - 6)  oxyCODONE    IR 5 milliGRAM(s) Oral every 4 hours PRN Mild Pain (1 - 3)    Allergies    No Known Allergies    Intolerances        OBJECTIVE:     Vital Signs Last 24 Hrs  T(C): 36.8 (10 Oct 2018 05:06), Max: 37.1 (09 Oct 2018 14:06)  T(F): 98.2 (10 Oct 2018 05:06), Max: 98.7 (09 Oct 2018 14:06)  HR: 59 (10 Oct 2018 05:06) (59 - 85)  BP: 151/73 (10 Oct 2018 05:06) (138/85 - 151/73)  BP(mean): --  RR: 17 (10 Oct 2018 05:06) (16 - 18)  SpO2: 96% (10 Oct 2018 05:06) (95% - 96%)    Physical Exam:         Awake and alert, c/o R sided neck pain         Neck - supple, tenderness along the right paraspinal region, ROM associated with pain.         Bilateral Upper Extremities:             Good Motor Strength             Sensation intact         Bilateral Lower Extremities             Good Motor Strength             Senation intact        Negative Allan's/clonus/hyperreflexia  	                     LABS:                        11.2   7.50  )-----------( 242      ( 09 Oct 2018 05:30 )             33.0     10-09    142  |  106  |  26<H>  ----------------------------<  212<H>  4.0   |  26  |  0.78    Ca    8.6      09 Oct 2018 05:30    ESR  24  CRP  2.57      RADIOLOGY & ADDITIONAL STUDIES:   MRI Cervical Spine - destructive lesion involving the right lateral mass of C1. Old signal changes in cord C3/4 L side with central stenosis. Tumor vs infection.   MRI of the thoracic spine - demonstrates destructive process of T9 suggestive of osteomyelitis.      A/P :  78y Male with severe neck pain, Destructive lesion C1 and T9, Tumor vs infection  -   Patient requires additional work-up prior to surgery.   Agree with ID evaluation.   Will follow up ESR and blood cultures.   Awaiting Gallium scan to further evaluate spine for the possibility of infection/abscess. Spoke with Nuclear Medicine Department. Patient was injected and then scanned 48 hours later. Gallium Scan results would not be available until late in day on Friday. Therefore, surgery can not be performed on Thursday and will have to be re-scheduled after all work-up is completed.  -   Continue pain management.  -    Continue present treatment

## 2018-10-10 NOTE — PROGRESS NOTE ADULT - SUBJECTIVE AND OBJECTIVE BOX
Pt is c/o neck pain otherwise uneventful night.  He is awaiting gallium scan and his surgery is now postponed.  He was noted to have scant amount of blood when patient had a BM.  No abd pain.    Date of Service: 10-10-18 @ 09:44    Vital Signs Last 24 Hrs  T(C): 36.8 (10 Oct 2018 05:06), Max: 37.1 (09 Oct 2018 14:06)  T(F): 98.2 (10 Oct 2018 05:06), Max: 98.7 (09 Oct 2018 14:06)  HR: 59 (10 Oct 2018 05:06) (59 - 85)  BP: 151/73 (10 Oct 2018 05:06) (138/85 - 151/73)  BP(mean): --  RR: 17 (10 Oct 2018 05:06) (16 - 18)  SpO2: 96% (10 Oct 2018 05:06) (95% - 96%)    Daily     Daily     I&O's Detail    09 Oct 2018 07:01  -  10 Oct 2018 07:00  --------------------------------------------------------  IN:    sodium chloride 0.9%.: 1779 mL  Total IN: 1779 mL    OUT:    Voided: 1400 mL  Total OUT: 1400 mL    Total NET: 379 mL          CAPILLARY BLOOD GLUCOSE      POCT Blood Glucose.: 226 mg/dL (10 Oct 2018 07:45)  POCT Blood Glucose.: 291 mg/dL (09 Oct 2018 21:26)  POCT Blood Glucose.: 363 mg/dL (09 Oct 2018 18:46)  POCT Blood Glucose.: 435 mg/dL (09 Oct 2018 17:11)  POCT Blood Glucose.: 426 mg/dL (09 Oct 2018 17:09)  POCT Blood Glucose.: 251 mg/dL (09 Oct 2018 11:44)                                      11.2   7.50  )-----------( 242      ( 09 Oct 2018 05:30 )             33.0       10-09    142  |  106  |  26<H>  ----------------------------<  212<H>  4.0   |  26  |  0.78    Ca    8.6      09 Oct 2018 05:30        PT/INR - ( 08 Oct 2018 12:20 )   PT: 11.1 sec;   INR: 1.03 ratio         PTT - ( 08 Oct 2018 12:20 )  PTT:25.8 sec                MEDICATIONS  (STANDING):  amLODIPine   Tablet 5 milliGRAM(s) Oral daily  atorvastatin 20 milliGRAM(s) Oral at bedtime  dexamethasone  Injectable 4 milliGRAM(s) IV Push every 8 hours  dextrose 5%. 1000 milliLiter(s) (50 mL/Hr) IV Continuous <Continuous>  dextrose 50% Injectable 12.5 Gram(s) IV Push once  dextrose 50% Injectable 25 Gram(s) IV Push once  dextrose 50% Injectable 25 Gram(s) IV Push once  docusate sodium 100 milliGRAM(s) Oral two times a day  heparin  Injectable 5000 Unit(s) SubCutaneous every 8 hours  insulin glargine Injectable (LANTUS) 15 Unit(s) SubCutaneous at bedtime  insulin lispro (HumaLOG) corrective regimen sliding scale   SubCutaneous at bedtime  insulin lispro (HumaLOG) corrective regimen sliding scale   SubCutaneous three times a day before meals  losartan 100 milliGRAM(s) Oral daily  polyethylene glycol 3350 17 Gram(s) Oral daily  sodium chloride 0.9%. 1000 milliLiter(s) (75 mL/Hr) IV Continuous <Continuous>    MEDICATIONS  (PRN):  acetaminophen   Tablet .. 650 milliGRAM(s) Oral every 6 hours PRN Temp greater or equal to 38C (100.4F), Mild Pain (1 - 3)  dextrose 40% Gel 15 Gram(s) Oral once PRN Blood Glucose LESS THAN 70 milliGRAM(s)/deciliter  glucagon  Injectable 1 milliGRAM(s) IntraMuscular once PRN Glucose LESS THAN 70 milligrams/deciliter  HYDROmorphone  Injectable 1 milliGRAM(s) IV Push every 4 hours PRN Severe Pain (7 - 10)  oxyCODONE    IR 10 milliGRAM(s) Oral every 4 hours PRN Moderate Pain (4 - 6)  oxyCODONE    IR 5 milliGRAM(s) Oral every 4 hours PRN Mild Pain (1 - 3)

## 2018-10-10 NOTE — CONSULT NOTE ADULT - SUBJECTIVE AND OBJECTIVE BOX
Patient is a 78y old  Male who presents with a chief complaint of Neck pain (10 Oct 2018 09:42)      HPI:      PAST MEDICAL & SURGICAL HISTORY:      PREVIOUS CARDIAC WORKUP:      Echo:  Stress Test:  Cardiac Cath:    ALLERGIES:    No Known Allergies       MEDICATIONS  (STANDING):  amLODIPine   Tablet 5 milliGRAM(s) Oral daily  atorvastatin 20 milliGRAM(s) Oral at bedtime  dexamethasone  Injectable 4 milliGRAM(s) IV Push every 8 hours  dextrose 5%. 1000 milliLiter(s) (50 mL/Hr) IV Continuous <Continuous>  dextrose 50% Injectable 12.5 Gram(s) IV Push once  dextrose 50% Injectable 25 Gram(s) IV Push once  dextrose 50% Injectable 25 Gram(s) IV Push once  docusate sodium 100 milliGRAM(s) Oral two times a day  heparin  Injectable 5000 Unit(s) SubCutaneous every 8 hours  insulin glargine Injectable (LANTUS) 15 Unit(s) SubCutaneous at bedtime  insulin lispro (HumaLOG) corrective regimen sliding scale   SubCutaneous at bedtime  insulin lispro (HumaLOG) corrective regimen sliding scale   SubCutaneous three times a day before meals  losartan 100 milliGRAM(s) Oral daily  pantoprazole    Tablet 40 milliGRAM(s) Oral before breakfast  polyethylene glycol 3350 17 Gram(s) Oral daily  sodium chloride 0.9%. 1000 milliLiter(s) (75 mL/Hr) IV Continuous <Continuous>    MEDICATIONS  (PRN):  acetaminophen   Tablet .. 650 milliGRAM(s) Oral every 6 hours PRN Temp greater or equal to 38C (100.4F), Mild Pain (1 - 3)  dextrose 40% Gel 15 Gram(s) Oral once PRN Blood Glucose LESS THAN 70 milliGRAM(s)/deciliter  glucagon  Injectable 1 milliGRAM(s) IntraMuscular once PRN Glucose LESS THAN 70 milligrams/deciliter  HYDROmorphone  Injectable 1 milliGRAM(s) IV Push every 4 hours PRN Severe Pain (7 - 10)  oxyCODONE    IR 10 milliGRAM(s) Oral every 4 hours PRN Moderate Pain (4 - 6)  oxyCODONE    IR 5 milliGRAM(s) Oral every 4 hours PRN Mild Pain (1 - 3)      FAMILY HISTORY:        SOCIAL HISTORY:  .scl     ROS:     .ros    Vital Signs Last 24 Hrs  T(C): 36.8 (10 Oct 2018 05:06), Max: 37.1 (09 Oct 2018 14:06)  T(F): 98.2 (10 Oct 2018 05:06), Max: 98.7 (09 Oct 2018 14:06)  HR: 59 (10 Oct 2018 05:06) (59 - 85)  BP: 151/73 (10 Oct 2018 05:06) (138/85 - 151/73)  BP(mean): --  RR: 17 (10 Oct 2018 05:06) (16 - 18)  SpO2: 96% (10 Oct 2018 05:06) (95% - 96%)    I&O's Summary    09 Oct 2018 07:01  -  10 Oct 2018 07:00  --------------------------------------------------------  IN: 1779 mL / OUT: 1400 mL / NET: 379 mL        PHYSICAL EXAM:    .phy      TELEMETRY:    ECG:    LABS:                          11.2   7.50  )-----------( 242      ( 09 Oct 2018 05:30 )             33.0     10-09    142  |  106  |  26<H>  ----------------------------<  212<H>  4.0   |  26  |  0.78    Ca    8.6      09 Oct 2018 05:30            PT/INR - ( 08 Oct 2018 12:20 )   PT: 11.1 sec;   INR: 1.03 ratio         PTT - ( 08 Oct 2018 12:20 )  PTT:25.8 sec          RADIOLOGY & ADDITIONAL STUDIES: Chief complaint of Neck pain (10 Oct 2018 09:42)      HPI:  77 yo male with neck pain and lytic lesion in the C spine that is being evaluated for infective vs metastatic pathology and will probably need surgery in the upcoming days.       PAST MEDICAL & SURGICAL HISTORY:  HTN  DM  HLD        PREVIOUS CARDIAC WORKUP:  None      ALLERGIES:    No Known Allergies       MEDICATIONS  (STANDING):  amLODIPine   Tablet 5 milliGRAM(s) Oral daily  atorvastatin 20 milliGRAM(s) Oral at bedtime  dexamethasone  Injectable 4 milliGRAM(s) IV Push every 8 hours  dextrose 5%. 1000 milliLiter(s) (50 mL/Hr) IV Continuous <Continuous>  dextrose 50% Injectable 12.5 Gram(s) IV Push once  dextrose 50% Injectable 25 Gram(s) IV Push once  dextrose 50% Injectable 25 Gram(s) IV Push once  docusate sodium 100 milliGRAM(s) Oral two times a day  heparin  Injectable 5000 Unit(s) SubCutaneous every 8 hours  insulin glargine Injectable (LANTUS) 15 Unit(s) SubCutaneous at bedtime  insulin lispro (HumaLOG) corrective regimen sliding scale   SubCutaneous at bedtime  insulin lispro (HumaLOG) corrective regimen sliding scale   SubCutaneous three times a day before meals  losartan 100 milliGRAM(s) Oral daily  pantoprazole    Tablet 40 milliGRAM(s) Oral before breakfast  polyethylene glycol 3350 17 Gram(s) Oral daily  sodium chloride 0.9%. 1000 milliLiter(s) (75 mL/Hr) IV Continuous <Continuous>    MEDICATIONS  (PRN):  acetaminophen   Tablet .. 650 milliGRAM(s) Oral every 6 hours PRN Temp greater or equal to 38C (100.4F), Mild Pain (1 - 3)  dextrose 40% Gel 15 Gram(s) Oral once PRN Blood Glucose LESS THAN 70 milliGRAM(s)/deciliter  glucagon  Injectable 1 milliGRAM(s) IntraMuscular once PRN Glucose LESS THAN 70 milligrams/deciliter  HYDROmorphone  Injectable 1 milliGRAM(s) IV Push every 4 hours PRN Severe Pain (7 - 10)  oxyCODONE    IR 10 milliGRAM(s) Oral every 4 hours PRN Moderate Pain (4 - 6)  oxyCODONE    IR 5 milliGRAM(s) Oral every 4 hours PRN Mild Pain (1 - 3)      FAMILY HISTORY:  Negative for early onset CAD         SOCIAL HISTORY:  +smoker. No ETOH abuse. No illicit drugs.     ROS:     Detailed ten system ROS was performed and was negative except for history as eluded to above.    no fever  no chills  no nausea  no vomiting  no diarrhea  no constipation  no melena  no hematochezia  no chest pain  no palpitations  no sob at rest  no dyspnea on exertion  no cough  no wheezing  no anorexia  no headache  no dizziness  no syncope  no weakness  no myalgia  no dysuria  no polyuria  no hematuria       Vital Signs Last 24 Hrs  T(C): 36.8 (10 Oct 2018 05:06), Max: 37.1 (09 Oct 2018 14:06)  T(F): 98.2 (10 Oct 2018 05:06), Max: 98.7 (09 Oct 2018 14:06)  HR: 59 (10 Oct 2018 05:06) (59 - 85)  BP: 151/73 (10 Oct 2018 05:06) (138/85 - 151/73)  BP(mean): --  RR: 17 (10 Oct 2018 05:06) (16 - 18)  SpO2: 96% (10 Oct 2018 05:06) (95% - 96%)    I&O's Summary    09 Oct 2018 07:01  -  10 Oct 2018 07:00  --------------------------------------------------------  IN: 1779 mL / OUT: 1400 mL / NET: 379 mL        PHYSICAL EXAM:    General:                Comfortable, AAO X 3, in no distress.  HEENT:                  Atraumatic, PERRLA, EOMI, conjunctiva clear.    Neck:                     No adenopathy, no thyromegaly, no JVD, no bruit.  Back:                     Symmetric, non tender.  Chest:                    Clear, B/L symmetric air entry, no tachypnea  Heart:                     S1, S2 normal, no gallop, no murmur.  Abdomen:              Soft, non-tender, bowel sounds active. No palpable masses.  Extremities:           no cyanosis, no edema. Peripheral pulses normal.  Skin:                      Skin color, texture normal. No rashes.  Neurologic:            Grossly nonfocal.       ECG:  NSR, PAC, no ST T changes of ischemia or infarction.     LABS:                        11.2   7.50  )-----------( 242      ( 09 Oct 2018 05:30 )             33.0     10-09    142  |  106  |  26<H>  ----------------------------<  212<H>  4.0   |  26  |  0.78    Ca    8.6      09 Oct 2018 05:30      PT/INR - ( 08 Oct 2018 12:20 )   PT: 11.1 sec;   INR: 1.03 ratio    PTT - ( 08 Oct 2018 12:20 )  PTT:25.8 sec    RADIOLOGY & ADDITIONAL STUDIES:

## 2018-10-11 NOTE — PROGRESS NOTE ADULT - SUBJECTIVE AND OBJECTIVE BOX
Chief Complaint: Neck Pain    History: Patient admitted with severe neck pain. Underwent work-up as outpatient which demonstrated destructive lesion involving the right lateral mass of C1 as well as lung mass. Recent MRI of cervical and thoracic spine again demonstrated destructive lesion involving C1 as well as destructive process involving T9. MRI reviewed with radiology who feels MRI findings may represent infection/abcess.    10/10/18 Patient with persistent R sided neck pain. No radicular pain. Denies history of previous radicular pain  10/11/18 Patient with persistent neck pain. Awaiting Gallium scan     MEDICATIONS  (STANDING):  amLODIPine   Tablet 5 milliGRAM(s) Oral daily  atorvastatin 20 milliGRAM(s) Oral at bedtime  dexamethasone  Injectable 4 milliGRAM(s) IV Push every 8 hours  dextrose 5%. 1000 milliLiter(s) (50 mL/Hr) IV Continuous <Continuous>  dextrose 50% Injectable 12.5 Gram(s) IV Push once  dextrose 50% Injectable 25 Gram(s) IV Push once  dextrose 50% Injectable 25 Gram(s) IV Push once  docusate sodium 100 milliGRAM(s) Oral two times a day  heparin  Injectable 5000 Unit(s) SubCutaneous every 8 hours  insulin glargine Injectable (LANTUS) 15 Unit(s) SubCutaneous at bedtime  insulin lispro (HumaLOG) corrective regimen sliding scale   SubCutaneous at bedtime  insulin lispro (HumaLOG) corrective regimen sliding scale   SubCutaneous three times a day before meals  losartan 100 milliGRAM(s) Oral daily  pantoprazole    Tablet 40 milliGRAM(s) Oral before breakfast  polyethylene glycol 3350 17 Gram(s) Oral daily  sodium chloride 0.9%. 1000 milliLiter(s) (75 mL/Hr) IV Continuous <Continuous>    MEDICATIONS  (PRN):  acetaminophen   Tablet .. 650 milliGRAM(s) Oral every 6 hours PRN Temp greater or equal to 38C (100.4F), Mild Pain (1 - 3)  dextrose 40% Gel 15 Gram(s) Oral once PRN Blood Glucose LESS THAN 70 milliGRAM(s)/deciliter  glucagon  Injectable 1 milliGRAM(s) IntraMuscular once PRN Glucose LESS THAN 70 milligrams/deciliter  HYDROmorphone  Injectable 1 milliGRAM(s) IV Push every 4 hours PRN Severe Pain (7 - 10)  oxyCODONE    IR 10 milliGRAM(s) Oral every 4 hours PRN Moderate Pain (4 - 6)  oxyCODONE    IR 5 milliGRAM(s) Oral every 4 hours PRN Mild Pain (1 - 3)    Allergies    No Known Allergies    Intolerances        OBJECTIVE:     Vital Signs Last 24 Hrs  T(C): 36.6 (11 Oct 2018 04:50), Max: 36.8 (10 Oct 2018 21:09)  T(F): 97.8 (11 Oct 2018 04:50), Max: 98.3 (10 Oct 2018 21:09)  HR: 64 (11 Oct 2018 05:37) (54 - 64)  BP: 137/56 (11 Oct 2018 04:50) (135/88 - 137/56)  BP(mean): --  RR: 18 (11 Oct 2018 04:50) (18 - 18)  SpO2: 95% (11 Oct 2018 04:50) (94% - 95%)    Physical Exam:         Awake and alert, c/o R sided neck pain         Neck - supple, tenderness along the right paraspinal region, ROM associated with pain.         Bilateral Upper Extremities:             Good Motor Strength             Sensation intact         Bilateral Lower Extremities             Good Motor Strength             Senation intact        Negative Allan's/clonus/hyperreflexia  	                     LABS:                        11.1   8.82  )-----------( 262      ( 11 Oct 2018 06:13 )             32.7     10-10    141  |  106  |  23  ----------------------------<  316<H>  3.9   |  29  |  0.91    Ca    8.7      10 Oct 2018 12:09      ESR  24  CRP  2.57      RADIOLOGY & ADDITIONAL STUDIES:   MRI Cervical Spine - destructive lesion involving the right lateral mass of C1. Old signal changes in cord C3/4 L side with central stenosis. Tumor vs infection.   MRI of the thoracic spine - demonstrates destructive process of T9 suggestive of osteomyelitis.      A/P :  78y Male with severe neck pain, Destructive lesion C1 and T9, Tumor vs infection  -   Patient requires additional work-up prior to surgery.   Agree with ID evaluation.   Will follow up ESR and blood cultures.   Awaiting Gallium scan to further evaluate spine for the possibility of infection/abscess. Spoke with Nuclear Medicine Department. Patient was injected and then scanned 48 hours later. Gallium Scan results would not be available until late in day on Friday. Family has decided to transfer patient into [facility]. Will follow until transferred.   -   Continue pain management.  -    Continue present treatment

## 2018-10-11 NOTE — PROGRESS NOTE ADULT - NSHPATTENDINGPLANDISCUSS_GEN_ALL_CORE
Dr Tres Roa
patient, nursing, Dr. Hernandez,. Dr. Rosales, Dr. Lemons
patient, nursing, Dr. Hernandez,. Dr. Rosales, Dr. Lemons
patient, nursing, Dr. Hernandez. Dr. Rao, Dr. Rosales, Dr. Jaime

## 2018-10-11 NOTE — DISCHARGE NOTE ADULT - MEDICATION SUMMARY - MEDICATIONS TO TAKE
I will START or STAY ON the medications listed below when I get home from the hospital:    dexamethasone  -- 4 milligram(s) injectable every 8 hours  -- Indication: For Neck mass    acetaminophen 325 mg oral tablet  -- 2 tab(s) by mouth every 6 hours, As needed, Temp greater or equal to 38C (100.4F), Mild Pain (1 - 3)  -- Indication: For pain    oxyCODONE 10 mg oral tablet  -- 1 tab(s) by mouth every 4 hours, As needed, Moderate Pain (4 - 6)  -- Indication: For pain    oxyCODONE 5 mg oral tablet  -- 1 tab(s) by mouth every 4 hours, As needed, Mild Pain (1 - 3)  -- Indication: For pain    Cozaar 100 mg oral tablet  -- 1 tab(s) by mouth once a day  -- Indication: For HTN    insulin glargine  -- Indication: For Diabetes    metFORMIN 500 mg oral tablet, extended release  -- 2 tab(s) by mouth 2 times a day (with meals)  -- Indication: For Diabetes     Lipitor 20 mg oral tablet  -- 1 tab(s) by mouth once a day (at bedtime)  -- Indication: For Hyperlipidemia    metoprolol succinate 25 mg oral tablet, extended release  -- 1 tab(s) by mouth once a day  -- Indication: For HTN    albuterol 90 mcg/inh inhalation aerosol  -- 1 puff(s) inhaled every 6 hours, As Needed -for bronchospasm  -- Indication: For reactive airway disease     Norvasc 5 mg oral tablet  -- 1 tab(s) by mouth once a day  -- Indication: For HTN    hydroCHLOROthiazide 12.5 mg oral capsule  -- 1 cap(s) by mouth once a day  -- Indication: For HTN    pantoprazole 40 mg oral delayed release tablet  -- 1 tab(s) by mouth once a day (before a meal)  -- Indication: For GERD

## 2018-10-11 NOTE — DISCHARGE NOTE ADULT - HOSPITAL COURSE
Pt is a 79 y/o female ex-smoker (smoked 1.5 ppd for many years, quit 30 years ago), HTN, type 2 diabetes, hyperlipidemia who was recently diagnosed with RLL pleural based mass along with C1 expansile mass.  Due to increasing pain he was admitted for pain control and further evaluation of his presumed metastatic disease.     * C1 Expansile Mass-concern for metastatic lung CA vs cancer.  Spine f/u noted, will need surgery which is postponed until after gallium scan, continue steroids, ESR, CRP low therefore less likely infectious, f/u echo and cults, ID eval noted  No neurological deficits at this time, RT input noted, no need for urgent RT at this time.  Cards preop evaluation noted, family made arrangements for transfer to [facility], pt agreed as well.  Await echo, continue pain control, bowel regimen, heme-onc following.    * HTN-stable, continue Norvasc, losartan   * Scant Rectal Bleeding-? hemorrhoids, monitor for now if returns than consider GI eval.    * Type 2 Diabetes-uncontrolled due to steroids, continue to hold metformin, continue current dose of Lantus, continue ISS and titrate further as needed.  * Hyperlipidemia-continue atorvastatin  * GERD-continue Protonix

## 2018-10-11 NOTE — DISCHARGE NOTE ADULT - PATIENT PORTAL LINK FT
You can access the [facility] Patient Portal, offered by [facility], by registering with the following website: [email or web address]

## 2018-10-11 NOTE — PROGRESS NOTE ADULT - PROVIDER SPECIALTY LIST ADULT
Cardiology
Heme/Onc
Infectious Disease
Internal Medicine
Internal Medicine
Orthopedics
Internal Medicine

## 2018-10-11 NOTE — DISCHARGE NOTE ADULT - CARE PLAN
Principal Discharge DX:	Cervical mass  Goal:	need diagnosis and definitive treatment  Assessment and plan of treatment:	Transfer to [facility] for diagnosis, treatment and further surgery

## 2018-10-11 NOTE — DISCHARGE NOTE ADULT - PLAN OF CARE
need diagnosis and definitive treatment Transfer to [facility] for diagnosis, treatment and further surgery

## 2018-10-11 NOTE — PROGRESS NOTE ADULT - SUBJECTIVE AND OBJECTIVE BOX
CURRENT CARDIAC WORKUP:       Echo:  Nml EF, Calcified AML chordae, less likely to be endocarditis. Trace MR, mild AI    Allergies:   No Known Allergies      MEDICATIONS  (STANDING):  amLODIPine   Tablet 5 milliGRAM(s) Oral daily  atorvastatin 20 milliGRAM(s) Oral at bedtime  dexamethasone  Injectable 4 milliGRAM(s) IV Push every 8 hours  dextrose 5%. 1000 milliLiter(s) (50 mL/Hr) IV Continuous <Continuous>  dextrose 50% Injectable 12.5 Gram(s) IV Push once  dextrose 50% Injectable 25 Gram(s) IV Push once  dextrose 50% Injectable 25 Gram(s) IV Push once  docusate sodium 100 milliGRAM(s) Oral two times a day  heparin  Injectable 5000 Unit(s) SubCutaneous every 8 hours  insulin glargine Injectable (LANTUS) 15 Unit(s) SubCutaneous at bedtime  insulin lispro (HumaLOG) corrective regimen sliding scale   SubCutaneous at bedtime  insulin lispro (HumaLOG) corrective regimen sliding scale   SubCutaneous three times a day before meals  losartan 100 milliGRAM(s) Oral daily  pantoprazole    Tablet 40 milliGRAM(s) Oral before breakfast  polyethylene glycol 3350 17 Gram(s) Oral daily  sodium chloride 0.9%. 1000 milliLiter(s) (75 mL/Hr) IV Continuous <Continuous>    MEDICATIONS  (PRN):  acetaminophen   Tablet .. 650 milliGRAM(s) Oral every 6 hours PRN Temp greater or equal to 38C (100.4F), Mild Pain (1 - 3)  dextrose 40% Gel 15 Gram(s) Oral once PRN Blood Glucose LESS THAN 70 milliGRAM(s)/deciliter  glucagon  Injectable 1 milliGRAM(s) IntraMuscular once PRN Glucose LESS THAN 70 milligrams/deciliter  HYDROmorphone  Injectable 1 milliGRAM(s) IV Push every 4 hours PRN Severe Pain (7 - 10)  oxyCODONE    IR 10 milliGRAM(s) Oral every 4 hours PRN Moderate Pain (4 - 6)  oxyCODONE    IR 5 milliGRAM(s) Oral every 4 hours PRN Mild Pain (1 - 3)      ROS:     Detailed ten system ROS was performed and was negative except for history as eluded to above.    no fever  no chills  no nausea  no vomiting  no diarrhea  no constipation  no melena  no hematochezia  no chest pain  no palpitations  no sob at rest  + dyspnea on exertion  no cough  no wheezing  no anorexia  no headache  no dizziness  no syncope  no weakness  no myalgia  no dysuria  no polyuria  no hematuria       Vital Signs Last 24 Hrs  T(C): 36.6 (11 Oct 2018 04:50), Max: 36.8 (10 Oct 2018 21:09)  T(F): 97.8 (11 Oct 2018 04:50), Max: 98.3 (10 Oct 2018 21:09)  HR: 64 (11 Oct 2018 05:37) (54 - 64)  BP: 137/56 (11 Oct 2018 04:50) (135/88 - 137/56)  BP(mean): --  RR: 18 (11 Oct 2018 04:50) (18 - 18)  SpO2: 95% (11 Oct 2018 04:50) (94% - 95%)    I&O's Summary    10 Oct 2018 07:01  -  11 Oct 2018 07:00  --------------------------------------------------------  IN: 0 mL / OUT: 1075 mL / NET: -1075 mL        PHYSICAL EXAM:    .phy       INTERPRETATION OF TELEMETRY:    ECG:    LABS:                        11.1   8.82  )-----------( 262      ( 11 Oct 2018 06:13 )             32.7     10-10    141  |  106  |  23  ----------------------------<  316<H>  3.9   |  29  |  0.91    Ca    8.7      10 Oct 2018 12:09                        RADIOLOGY & ADDITIONAL STUDIES: 79 yo male with neck pain and lytic lesion in the C spine that is being evaluated for infective vs metastatic pathology and will probably need surgery in the upcoming days.     Today, no new events. Contemplating transfer to [facility] for spinal surgery.    PAST MEDICAL & SURGICAL HISTORY:  HTN  DM  HLD      PREVIOUS CARDIAC WORKUP:  None    CURRENT CARDIAC WORKUP:       Echo:  Nml EF, Calcified AML chordae, less likely to be endocarditis. Trace MR, mild AI    Allergies:   No Known Allergies      MEDICATIONS  (STANDING):  amLODIPine   Tablet 5 milliGRAM(s) Oral daily  atorvastatin 20 milliGRAM(s) Oral at bedtime  dexamethasone  Injectable 4 milliGRAM(s) IV Push every 8 hours  dextrose 5%. 1000 milliLiter(s) (50 mL/Hr) IV Continuous <Continuous>  dextrose 50% Injectable 12.5 Gram(s) IV Push once  dextrose 50% Injectable 25 Gram(s) IV Push once  dextrose 50% Injectable 25 Gram(s) IV Push once  docusate sodium 100 milliGRAM(s) Oral two times a day  heparin  Injectable 5000 Unit(s) SubCutaneous every 8 hours  insulin glargine Injectable (LANTUS) 15 Unit(s) SubCutaneous at bedtime  insulin lispro (HumaLOG) corrective regimen sliding scale   SubCutaneous at bedtime  insulin lispro (HumaLOG) corrective regimen sliding scale   SubCutaneous three times a day before meals  losartan 100 milliGRAM(s) Oral daily  pantoprazole    Tablet 40 milliGRAM(s) Oral before breakfast  polyethylene glycol 3350 17 Gram(s) Oral daily  sodium chloride 0.9%. 1000 milliLiter(s) (75 mL/Hr) IV Continuous <Continuous>    MEDICATIONS  (PRN):  acetaminophen   Tablet .. 650 milliGRAM(s) Oral every 6 hours PRN Temp greater or equal to 38C (100.4F), Mild Pain (1 - 3)  dextrose 40% Gel 15 Gram(s) Oral once PRN Blood Glucose LESS THAN 70 milliGRAM(s)/deciliter  glucagon  Injectable 1 milliGRAM(s) IntraMuscular once PRN Glucose LESS THAN 70 milligrams/deciliter  HYDROmorphone  Injectable 1 milliGRAM(s) IV Push every 4 hours PRN Severe Pain (7 - 10)  oxyCODONE    IR 10 milliGRAM(s) Oral every 4 hours PRN Moderate Pain (4 - 6)  oxyCODONE    IR 5 milliGRAM(s) Oral every 4 hours PRN Mild Pain (1 - 3)      ROS:     Detailed ten system ROS was performed and was negative except for history as eluded to above.    no fever  no chills  no nausea  no vomiting  no diarrhea  no constipation  no melena  no hematochezia  no chest pain  no palpitations  no sob at rest  + dyspnea on exertion  no cough  no wheezing  no anorexia  no headache  no dizziness  no syncope  no weakness  no myalgia  no dysuria  no polyuria  no hematuria       Vital Signs Last 24 Hrs  T(C): 36.6 (11 Oct 2018 04:50), Max: 36.8 (10 Oct 2018 21:09)  T(F): 97.8 (11 Oct 2018 04:50), Max: 98.3 (10 Oct 2018 21:09)  HR: 64 (11 Oct 2018 05:37) (54 - 64)  BP: 137/56 (11 Oct 2018 04:50) (135/88 - 137/56)  BP(mean): --  RR: 18 (11 Oct 2018 04:50) (18 - 18)  SpO2: 95% (11 Oct 2018 04:50) (94% - 95%)    I&O's Summary    10 Oct 2018 07:01  -  11 Oct 2018 07:00  --------------------------------------------------------  IN: 0 mL / OUT: 1075 mL / NET: -1075 mL        PHYSICAL EXAM:    .phy       INTERPRETATION OF TELEMETRY:    ECG:    LABS:                        11.1   8.82  )-----------( 262      ( 11 Oct 2018 06:13 )             32.7     10-10    141  |  106  |  23  ----------------------------<  316<H>  3.9   |  29  |  0.91    Ca    8.7      10 Oct 2018 12:09

## 2018-10-11 NOTE — DISCHARGE NOTE ADULT - CARE PROVIDER_API CALL
Tres Rao), Internal Medicine  [address] [facility]  [address]  [place] [address]  Phone: [phone]  Fax: [phone]    Gayle Lam), Hematology; Oncology  [address] [place] [address]  Phone: [phone]  Fax: [phone]

## 2018-10-11 NOTE — PROGRESS NOTE ADULT - ASSESSMENT
This is a 79yearold gentleman with history of hypertension diabetes recently found to have an expansile lesion at the T1 level admitted for intractable cervical pain.    MRI report now suggestive of necrotic lesion at C1 still unclear if infectious or malignant.   As per ortho spine, patient is now pending gallium scan prior to biopsy. Image will take place after 48 hours bruna rodrigues now presently planned to transfer to [facility] today after galium study  continue with dexamethasone   Radiation oncology consult appreciated

## 2018-10-11 NOTE — PROGRESS NOTE ADULT - GASTROINTESTINAL DETAILS
bowel sounds normal/no distention/soft/nontender
bowel sounds normal/soft/no distention/nontender
soft/nontender/bowel sounds normal/no distention

## 2018-10-11 NOTE — PROGRESS NOTE ADULT - ASSESSMENT
C1 lesion Infective vs metastatic  No known CAD but high risk  HTN  DM  Obesity  Ex smoker    Suggest:    Echo shows normal EF.   No angina. Stress test is not absolutely necessary before OR.  Continue current treatment.   Add low dose beta blockers if HR permits. C1 lesion Infective vs metastatic  No known CAD but high risk  HTN  DM  Obesity  Ex smoker    Suggest:    Echo shows normal EF. Doubt vegetation clinically  No angina. Stress test is not absolutely necessary before OR.  Continue current treatment.   Add low dose beta blockers if HR permits.

## 2018-10-11 NOTE — PROGRESS NOTE ADULT - RS GEN PE MLT RESP DETAILS PC
breath sounds equal/clear to auscultation bilaterally/respirations non-labored
breath sounds equal/clear to auscultation bilaterally/respirations non-labored
clear to auscultation bilaterally/respirations non-labored/breath sounds equal

## 2018-12-13 NOTE — H&P ADULT - ASSESSMENT
Patient is 78y M presented with SOB, now s/p PEA and intubation    Admit to ICU  Case dw Dr. Lam, Dr. Rao has spoken to the son already    #Syncope  Patient episode associated with PEA  Possibly secondary to PE in setting of hypercoagulation, possible Pericardial effusion?  s/p CPR in ED; patient has regained normal Sinus Rhythm but is now Intubated after the episode    - Continue mech Vent  - Monitor vitals  - Check CT Head/Chest STAT    #Septic Shock  Hypotension, Lactate 3.3  Hold all anti-hypertensive home medications    - Monitor BPs off pressors  - IVF Resuscitation   - Check Echo    #DMII  - NPO for vent  - ISS Q6h    #Prophylactic measure  - Lovenox 40mg SubQ QD  - Chlorhexidine VAP prophx Protonix Stress Ulcer Prophx Patient is 78y M presented with SOB, now s/p PEA and intubation    Admit to ICU  Case dw Dr. Lam, Dr. Rao has spoken to the son already    #Syncope  Patient episode associated with PEA  Possibly secondary to PE in setting of hypercoagulation, possible Pericardial effusion?  s/p CPR in ED; patient has regained normal Sinus Rhythm but is now Intubated after the episode    - Continue mech Vent  - Monitor vitals  - Check CT Head/Chest STAT    #Septic Shock  Hypotension 90/50, Lactate 3.3; Temp 100.7  Hold all anti-hypertensive home medications    - IVF Resuscitation   - F/u Blood cultures; Urine Culture  - IV Vancomycin 1g IV and Zosyn 3.375g IV given in ED  - Check Echo  - Monitor BPs off pressors  - Monitor Temps    #DMII  - NPO for vent  - ISS Q6h    #Prophylactic measure  - Lovenox 40mg SubQ QD  - Chlorhexidine VAP prophx Protonix Stress Ulcer Prophx Patient is 78y M presented with SOB, now s/p PEA and intubation    Admit to ICU  Case dw Dr. Lam, Dr. Rao has spoken to the son already    #Syncope  Patient episode associated with PEA  Possibly secondary to PE in setting of hypercoagulation, possible Pericardial effusion?  s/p CPR in ED; patient has regained normal Sinus Rhythm but is now Intubated after the episode    - Continue mech Vent  - Monitor vitals  - Check CT Head/Chest STAT    #Septic Shock  Hypotension 90/50, Lactate 3.5; Temp 100.7  Hold all anti-hypertensive home medications    - IVF Resuscitation   - F/u Blood cultures; Urine Culture  - IV Vancomycin 1g IV and Zosyn 3.375g IV given in ED  - Check Echo  - Monitor BPs off pressors  - Monitor Temps    #DMII  - NPO for vent  - ISS Q6h    #Prophylactic measure  - Lovenox 40mg SubQ QD  - Chlorhexidine VAP prophx Protonix Stress Ulcer Prophx Patient is 78y M presented with SOB, now s/p PEA and intubation    Admit to ICU  Case dw Dr. Lam, Dr. Rao has spoken to the son already    #Syncope  Patient episode associated with PEA  Possibly secondary to PE in setting of hypercoagulation, possible Pericardial effusion?  s/p CPR in ED; patient has regained normal Sinus Rhythm but is now Intubated after the episode    - Continue mech Vent  - Monitor vitals  - Check CT Head/Chest STAT    #Septic Shock  Hypotension 90/50, Lactate 3.5; Temp 100.7  Hold all anti-hypertensive home medications    - IVF Resuscitation   - F/u Blood cultures; Urine Culture  - IV Vancomycin 1250mg IV and Zosyn 3.375g IV given in ED  - Check Echo  - Monitor BPs off pressors  - Monitor Temps    #DMII  - NPO for vent  - ISS Q6h    #Prophylactic measure  - Lovenox 40mg SubQ QD  - Chlorhexidine VAP prophx Protonix Stress Ulcer Prophx

## 2018-12-13 NOTE — CONSULT NOTE ADULT - SUBJECTIVE AND OBJECTIVE BOX
[facility] consult   HPI:  Patient is a 78y M PMHx of DMII, HTN, lung Ca stage IV with mets to neck s/p tumor removal of the neck on 10/30, presents to the [facility] ED on 12/13/18 from home with Shortness of breath. S/P surgery, patient spent three weeks in [facility] and was discharged day before thanksgiving. Over this time after surgery the patent has been started on chemotherapy and has since seen an acute decline in his overall functional status, has become progressively weaker, associated with appetite suppression, increased lethargy, and generalized pain, controlled with Morphine and oxycodone. Shortly after arrival to the ED the patient had an episode of cardiac arrest and became apneic. Patient was noticed to be in PEA, CPR was started patient was intubated by Anesthesia and placed on Propofol. ICU was consulted, seen at bedside, patient hemodynamically stable on Mechanical Vent. (13 Dec 2018 13:18)      Allergies    No Known Allergies    Intolerances        MEDICATIONS  (STANDING):  chlorhexidine 0.12% Liquid 15 milliLiter(s) Oral Mucosa two times a day  dextrose 5%. 1000 milliLiter(s) (50 mL/Hr) IV Continuous <Continuous>  dextrose 50% Injectable 12.5 Gram(s) IV Push once  dextrose 50% Injectable 25 Gram(s) IV Push once  dextrose 50% Injectable 25 Gram(s) IV Push once  DOPamine Infusion 10 MICROgram(s)/kG/Min (30.975 mL/Hr) IV Continuous <Continuous>  enoxaparin Injectable 40 milliGRAM(s) SubCutaneous every 24 hours  influenza   Vaccine 0.5 milliLiter(s) IntraMuscular once  pantoprazole  Injectable 40 milliGRAM(s) IV Push daily  phenylephrine    Infusion 0.1 MICROgram(s)/kG/Min (3.097 mL/Hr) IV Continuous <Continuous>  piperacillin/tazobactam IVPB. 3.375 Gram(s) IV Intermittent every 8 hours  sodium chloride 0.9%. 1000 milliLiter(s) (75 mL/Hr) IV Continuous <Continuous>    MEDICATIONS  (PRN):  dextrose 40% Gel 15 Gram(s) Oral once PRN Blood Glucose LESS THAN 70 milliGRAM(s)/deciliter  glucagon  Injectable 1 milliGRAM(s) IntraMuscular once PRN Glucose LESS THAN 70 milligrams/deciliter      PAST MEDICAL & SURGICAL HISTORY:  Lung cancer: Stage IV with distant mets  Essential hypertension  Type 2 diabetes mellitus without complication, without long-term current use of insulin  H/O neck surgery: Tumor Resection      FAMILY HISTORY:  Family history of Parkinson disease (Father): Father      SOCIAL HISTORY: No EtOH, no tobacco      Todays's Evaluation:    GENERAL: intubated on MV  CHEST/LUNG: diminished bilaterally   HEART: tachycardia  ABDOMEN: Soft,decreased bowel sounds  EXTREMITIES:  edema  NEUROLOGY: non-focal  SKIN: No rashes or lesions    Laboratories:                           10.4   11.47 )-----------( 364      ( 13 Dec 2018 12:00 )             33.9       12-13    141  |  105  |  45<H>  ----------------------------<  112<H>  4.5   |  29  |  1.05    Ca    9.4      13 Dec 2018 12:00  Mg     2.0     12-13    TPro  6.6  /  Alb  2.4<L>  /  TBili  0.5  /  DBili  x   /  AST  19  /  ALT  20  /  AlkPhos  91  12-13      Magnesium, Serum: 2.0 mg/dL (12-13 @ 12:00)    < from: CT Angio Chest PE Protocol w/ IV Cont (12.13.18 @ 14:09) >    EXAM:  CTA CHEST PE PROTOCOL (W)AW IC                            PROCEDURE DATE:  12/13/2018          INTERPRETATION:  CTA CHEST PE PROTOCOL (W)AW IC    HISTORY:  Syncope, respiratory failure    TECHNIQUE: Contrast enhanced CT pulmonary angiogram was performed.   Multiplanar CT and HRCT images were reviewed. Maximum intensity   projection (MIP) images are reconstructed as per CT angiography protocol.   Images were acquired during the administration of 90 cc Omnipaque 350 IV   contrast. This study was performed using automatic exposure control   (radiation dose reduction software) to obtain a diagnostic image quality   scan with patient dose as low as reasonably achievable.    COMPARISON: Chest x-ray from the same-day, chest CT 11/15/2018    PULMONARY ARTERIES: No pulmonary embolism.    LUNGS, AIRWAYS: The tip of the endotracheal tube is above the sae. The   central airways are patent. Bibasilar atelectasis.    Slight interval enlargement of right lower lobe superior segment mass   measuring 4.3 x 3.5 cm (4; 62), previously 4.1 x 3.0 cm.    There is a new nodule in the left upper lobe measures 0.7 cm (4; 40).    PLEURA: Trace bilateral pleural effusions.    HEART AND VESSELS: Normal heart size. No pericardial effusion. Coronary   and aortic atherosclerosis. Stable caliber thoracic aorta.    MEDIASTINUM, JOSE DE JESUS, AXILLAE: No adenopathy.    UPPER ABDOMEN: No acute or neoplastic findings.    BONES AND CHEST WALL: Multiple metastatic bone lesions including a 10.0 x   8.2 cm soft tissue mass involving the right AC joint, lateral clavicle,   and lateral scapula (2; 1). Enlarging metastatic lesion to the right   lateral sixth rib with extraosseous soft tissue component measuring 4.4 x   4.2 cm (2; 62). Additional metastatic destruction of the right T4   transverse process, T8, and T9 vertebral bodies with interval worsening.    IMPRESSION:     No pulmonary embolism.    Trace bilateral pleural effusions and bibasilar atelectasis.    Slight interval enlargement of right lower lobesuperior segment mass   measuring 4.3 x 3.5 cm (4; 62), previously 4.1 x 3.0 cm.    There is a new nodule in the left upper lobe measures 0.7 cm (4; 40).    Multiple metastatic bone lesions including a 10.0 x 8.2 cm soft tissue   mass involving the right AC joint, lateral clavicle, and lateral scapula   (2; 1).     Enlarging metastatic lesion to the right lateral sixth rib with   extraosseous soft tissue component measuring 4.4 x 4.2 cm (2; 62).     Additional metastatic destruction of the right T4 transverse process, T8,   and T9 vertebral bodies with interval worsening. Given the large size of   the T8 and T9 vertebral body lesions, the patient is at risk for a   pathologic fracture and neurosurgery consultation is advised.                LASHAUN BAGLEY   This document has been electronically signed. Dec 13 2018  2:54PM                < end of copied text >    Summary:    Plan:

## 2018-12-13 NOTE — ED PROVIDER NOTE - PROGRESS NOTE DETAILS
Adrian COE for ED attending, Dr. Rizvi: ICU consult placed. Adrian COE for ED attending, Dr. Rizvi: Interventional cardiology consult. Adrian COE for ED attending, Dr. Rizvi: Prior to initial evaluation, pt became unresponsive, taken to trauma room for emergent care. JW Pt suffered from cardiac arrest ROSC after 1 round of CPR.  Intubation attempted revealed airway occluded with necrotic eschar actively bleeding. Anesthesia consulted for difficult intubation.  In the interim after induction with RSI PT ventilated well SATS maintained at 95%.  Upon anesthesia arrival PT successfully intubated.  CT scan ordered.  PT admitted to ICU in stable condition

## 2018-12-13 NOTE — AIRWAY PLACEMENT NOTE ADULT - POST AIRWAY PLACEMENT ASSESSMENT:
positive end tidal CO2 noted/CXR pending/skin color improved/breath sounds bilateral/chest excursion noted

## 2018-12-13 NOTE — ED PROVIDER NOTE - MEDICAL DECISION MAKING DETAILS
77 y/o male s/p recent neck surgery presents to the ED regarding SOB, became unresponsive in ED. 79 y/o male s/p recent neck surgery presents to the ED regarding SOB, became unresponsive in ED. Admit to ICU.

## 2018-12-13 NOTE — ED PROVIDER NOTE - PMH
Essential hypertension    Lung cancer  Stage IV with distant mets  Type 2 diabetes mellitus without complication, without long-term current use of insulin

## 2018-12-13 NOTE — CONSULT NOTE ADULT - ASSESSMENT
78 year old male with hx of NSCLC Stage IV mets to bone C1 now here after Cardiac arrest     At this time he has completed initial treatment of chemotherapy with Carbo. alimta and keytruda on 12/7/2018.   in light of the recent events would continue to closely monitor  CT of the chest suggestive of POD   CT of chest also suggestive of severe axial disease involvement including metastatic destruction of thoracic vertebral bodies possible high risk for pathologic fracture.   continue with supportive care  Patient now DNR   Discussed with Dr. Rao, Dr. Lam   would continue with supportive management   agree with plans to refrain from additional escalation of care.   will continue to follow daily   spoke with Son and Daughter

## 2018-12-13 NOTE — PATIENT PROFILE ADULT - NSPROMUTINFOINDIVIDFT_GEN_A_NUR
Problem: PHYSICAL THERAPY ADULT  Goal: Performs mobility at highest level of function for planned discharge setting  See evaluation for individualized goals  Treatment/Interventions: Functional transfer training, LE strengthening/ROM, Therapeutic exercise, Endurance training, Patient/family training, Equipment eval/education, Bed mobility, Gait training  Equipment Recommended: Vicky Alejandro       See flowsheet documentation for full assessment, interventions and recommendations  Outcome: Progressing  Prognosis: Good  Problem List: Decreased strength, Decreased range of motion, Decreased endurance, Impaired balance, Decreased mobility, Pain  Assessment: Pt is 82 y/o female admitted to hospital s/p fall resulting in R femur fx; pt is s/p R IM nailing on 1/2/18  Currently WBAT RLE  Pt denies pain @ rest but pain increases to 8/10 after transfers  Pt PLOF is mod I using RW @ independent living facility  Pt reports she was sitting EOB getting dressed and slid off the bed; facility staff was able to help her onto her feet and she tried to walk with RW but fell again 2* R hip pain  Pt also reports hx of B/L TKR and B/L shoulder arthritis which limits functional mobility  Pt presents with R hip pain, dec RLE ROM, dec BLE strength, dec endurance, poor ability to weight shift, dec balance  Noted dec L DF strength and pt reports she does shuffle her feet when walking; contributing to fall risk  Pt reports using O2 @ home @ night; on RA pt SpO2 is 90-93% and drops to 86-89% after transfers; pt requires vc's for PLB; @ end of session SpO2 93% @ rest on RA  Overall pt requires max A x2 for bed mobility and transfers; unable to assess ambulation @ this time 2* pain and poor standing tolerance  Pt will benefit from short-term skilled PT to address above impairments and maximize I with all functional mobility     Barriers to Discharge: Decreased caregiver support     Recommendation: Short-term skilled PT     PT - OK to Discharge: Yes (to rehab)    See flowsheet documentation for full assessment  none

## 2018-12-13 NOTE — ED ADULT TRIAGE NOTE - CHIEF COMPLAINT QUOTE
complaining of shortness of breath, distended abdomen. recent neck surgery. patient has been bed bound for weeks. per EMS patient was tested for TB in PMD office unsure of when testing was.

## 2018-12-13 NOTE — ED ADULT NURSE NOTE - OBJECTIVE STATEMENT
pt presents to ED from home sent in by MD Rao for syncope and failure to thrive. EMS reports pt lives at home w/ wife who is a poor historian. pt reports neck pain, s/p 10/30 sx on malignant neoplasm in neck. pt on chemo, last chemo last week, scheduled for radiation today. pt short of breath upon EMS arrival spo2 84% on RA as per EMS. upon arrival to ED, pt 100% on NRB, placed on NC at 5L satting 94%. pt awake, alert and oriented x3. neck collar in place upon arrival. pt reports being bed bound at home but wife reports pt ambulates at baseline until this week. denies cp.

## 2018-12-13 NOTE — ED PROVIDER NOTE - CRITICAL CARE PROVIDED
additional history taking/interpretation of diagnostic studies/direct patient care (not related to procedure)/documentation/consult w/ pt's family directly relating to pts condition

## 2018-12-13 NOTE — H&P ADULT - GASTROINTESTINAL DETAILS
soft/normal/no guarding/obese habitus; some dullness to percussion over hypogastrium and LLQ/nontender/no rigidity

## 2018-12-13 NOTE — H&P ADULT - NSHPPHYSICALEXAM_GEN_ALL_CORE
ICU Vital Signs Last 24 Hrs  T(C): 38.2 (13 Dec 2018 11:13), Max: 38.2 (13 Dec 2018 11:13)  T(F): 100.7 (13 Dec 2018 11:13), Max: 100.7 (13 Dec 2018 11:13)  HR: 92 (13 Dec 2018 13:00) (92 - 142)  BP: 102/51 (13 Dec 2018 13:00) (90/42 - 141/92)  BP(mean): --  ABP: --  ABP(mean): --  RR: 15 (13 Dec 2018 13:00) (15 - 27)  SpO2: 93% (13 Dec 2018 13:00) (84% - 100%)

## 2018-12-13 NOTE — ED PROVIDER NOTE - OBJECTIVE STATEMENT
Prior to evaluation in [facility], pt became unresponsive at 11:37. 77 y/o male s/p recent neck surgery presents to the ED regarding SOB. Hx limited secondary to pt's unresponsiveness, critical condition. 77 y/o male with a PMHx of lung cancer with mets to the throat and shoulder s/p neck surgery 2 months ago, DM, HTN presents to the ED c/o worsening generalized weakness, SOB xdays. Pt started radiation 2 weeks ago and chemotherapy 3 days ago. Since pt has had progressively worsening generalized weakness, and SOB. Pt had another radiation treatment scheduled for today, but came to ED due to worsening symptoms. Former smoker (Quit 24 years ago). NKDA. Hx obtained from family at bedside secondary to pt's critical condition. 77 y/o male with a PMHx of lung cancer with mets to the throat and shoulder s/p neck surgery 2 months ago, DM, HTN presents to the ED c/o worsening generalized weakness, SOB xdays. Pt started radiation 2 weeks ago and chemotherapy 3 days ago. Since pt has had progressively worsening generalized weakness, and SOB. Pt had another radiation treatment scheduled for today, but came to ED due to worsening symptoms. Former smoker (Quit 24 years ago). NKDA. Hx obtained from family at bedside secondary to pt's critical condition.  PT suffered cardiac arrest 20 minutes after arrival.

## 2018-12-13 NOTE — H&P ADULT - HISTORY OF PRESENT ILLNESS
Patient is a 78y M PMHx of DMII, HTN, lung Ca stage IV with mets to neck s/p tumor removal of the neck on 10/30, presents to the [facility] ED on 12/13/18 from home with Shortness of breath. S/P surgery, patient spent three weeks in [facility] and was discharged day before thanksgiving. Over this time after surgery the patent has been started on chemotherapy and has since seen an acute decline in his overall functional status, has become progressively weaker, associated with appetite suppression, increased lethargy, and generalized pain, controlled with Morphine and oxycodone. Shortly after arrival to the ED the patient had an episode of cardiac arrest and became apneic. Patient was noticed to be in PEA, CPR was started patient was intubated by Anesthesia and placed on Propofol. ICU was consulted, seen at bedside, patient hemodynamically stable on Mechanical Vent.

## 2018-12-13 NOTE — H&P ADULT - NSHPSOCIALHISTORY_GEN_ALL_CORE
Patient with history of smoking roughly 25 pack years, quit 25 years ago. Patient lives with wife, has seen functional decline since starting radiotherapy approximately 4 weeks ago.

## 2018-12-13 NOTE — SEPSIS NOTE - ASSESSMENT
78y old M:  s/p Cardiac Arrest  Acute Hypoxemic Resp Failure  Inc A-a gradient    Plan:  Admit to ICU  Plan as per HPI

## 2018-12-13 NOTE — ED ADULT NURSE REASSESSMENT NOTE - NS ED NURSE REASSESS COMMENT FT1
pt alert and oriented, speaking w/ author RN and RUSSEL Cross at bedside. at ~1138 pt became unresponsive, no palpable pulses present w/ no spontaneous respirations. code blue called to room 18, MD Salazar and MD Prakash at bedside. CPR initiated, pt placed on defibrillator pads. ROSC achieved at 1140. respiratory called to bedside. pt alert and oriented, speaking w/ author RN and RUSSEL Cross at bedside. at ~1138 pt became unresponsive, no palpable pulses present w/ no spontaneous respirations. code blue called to room 18, MD Salazar and MD Prakash at bedside. CPR initiated, pt placed on defibrillator pads. ROSC achieved at 1140. respiratory called to bedside. 20 mg etomidate and 100 mg succinylcholine administered at 1141 via PIV. intubation attempted by MD Salazar and Dwain, unsuccessful 2/2 pt difficult airway, pt transported to trauma room w/ BVM. IO placed to L tibia. pt intubated in trauma room by anesthesia at 1151- + color change w/ co2 colormetric indicator, 8.0 tube 24'' at the lip. pt placed on propofol drip @ 5 mcg/kg initial.

## 2018-12-13 NOTE — ED PROVIDER NOTE - CARE PLAN
Principal Discharge DX:	Cardiac arrest Principal Discharge DX:	Cardiac arrest  Secondary Diagnosis:	Critical airway, initial encounter

## 2018-12-14 NOTE — CONSULT NOTE ADULT - SUBJECTIVE AND OBJECTIVE BOX
Date of Service 18 @ 12:02    Patient is a 78y old  Male who presents with a chief complaint of SOB (13 Dec 2018 19:54)      HPI: Patient is a 78y M PMHx of DMII, HTN, lung Ca stage IV with mets to neck s/p tumor removal of the neck on 10/30.  He was discharged to rehab for a few week and optimized and discharged home.  He was being followed by oncology with Dr Lam, started on chemo/RT.  He presents to the [place] ED on 18 from home with Shortness of breath, acute decline in his overall functional status, has become progressively weaker, associated with appetite suppression, increased lethargy, and generalized pain, controlled with Morphine and oxycodone.  Shortly after arrival to the ED the patient had an episode of cardiac arrest and became apneic. Patient was noticed to be in PEA, CPR was started patient was intubated by Pt was admitted to ICU on vent, dopamine.  Medicine consult called for comanagement.  Pt seen and examined, case d/w intensivist, Dr Rosales and family.  Pt is sedated on vent.        PAST MEDICAL & SURGICAL HISTORY:  Lung cancer: Stage IV with distant mets  Essential hypertension  Type 2 diabetes mellitus without complication, without long-term current use of insulin  H/O neck surgery: Tumor Resection      Allergies    No Known Allergies    Intolerances        MEDICATIONS  (STANDING):  chlorhexidine 0.12% Liquid 15 milliLiter(s) Oral Mucosa two times a day  dextrose 5%. 1000 milliLiter(s) (50 mL/Hr) IV Continuous <Continuous>  dextrose 50% Injectable 12.5 Gram(s) IV Push once  dextrose 50% Injectable 25 Gram(s) IV Push once  dextrose 50% Injectable 25 Gram(s) IV Push once  DOPamine Infusion 10 MICROgram(s)/kG/Min (30.975 mL/Hr) IV Continuous <Continuous>  enoxaparin Injectable 40 milliGRAM(s) SubCutaneous every 24 hours  influenza   Vaccine 0.5 milliLiter(s) IntraMuscular once  insulin lispro (HumaLOG) corrective regimen sliding scale   SubCutaneous every 6 hours  pantoprazole  Injectable 40 milliGRAM(s) IV Push daily  phenylephrine    Infusion 0.1 MICROgram(s)/kG/Min (3.097 mL/Hr) IV Continuous <Continuous>  piperacillin/tazobactam IVPB. 3.375 Gram(s) IV Intermittent every 8 hours  sodium chloride 0.9%. 1000 milliLiter(s) (75 mL/Hr) IV Continuous <Continuous>    MEDICATIONS  (PRN):  acetaminophen  Suppository .. 650 milliGRAM(s) Rectal every 6 hours PRN Temp greater or equal to 38.5C (101.3F)  dextrose 40% Gel 15 Gram(s) Oral once PRN Blood Glucose LESS THAN 70 milliGRAM(s)/deciliter  glucagon  Injectable 1 milliGRAM(s) IntraMuscular once PRN Glucose LESS THAN 70 milligrams/deciliter  HYDROmorphone  Injectable 1 milliGRAM(s) IV Push every 4 hours PRN Moderate Pain (4 - 6)  LORazepam   Injectable 2 milliGRAM(s) IV Push every 3 hours PRN Agitation      FAMILY HISTORY:  Family history of Parkinson disease (Father): Father      Social History: former smoker, no ETOH use, lives at home with family       Vital Signs Last 24 Hrs  T(C): 35.7 (14 Dec 2018 05:00), Max: 40.2 (14 Dec 2018 00:00)  T(F): 96.3 (14 Dec 2018 05:00), Max: 104.3 (14 Dec 2018 00:00)  HR: 100 (14 Dec 2018 09:00) (58 - 152)  BP: 124/62 (14 Dec 2018 09:00) (46/27 - 186/74)  BP(mean): 75 (14 Dec 2018 09:00) (31 - 101)  RR: 15 (14 Dec 2018 09:00) (8 - 24)  SpO2: 100% (14 Dec 2018 09:00) (92% - 100%)    Daily     Daily Weight in k.8 (14 Dec 2018 06:00)    I&O's Summary    13 Dec 2018 07:01  -  14 Dec 2018 07:00  --------------------------------------------------------  IN: 4467.2 mL / OUT: 1600 mL / NET: 2867.2 mL    14 Dec 2018 07:  -  14 Dec 2018 12:02  --------------------------------------------------------  IN: 212 mL / OUT: 0 mL / NET: 212 mL          Data                          10.2   4.32  )-----------( 286      ( 14 Dec 2018 10:17 )             32.3       12-14    139  |  109<H>  |  38<H>  ----------------------------<  166<H>  4.5   |  24  |  0.89    Ca    8.8      14 Dec 2018 10:17  Mg     2.0     12-13    TPro  6.3  /  Alb  2.2<L>  /  TBili  0.6  /  DBili  0.3<H>  /  AST  18  /  ALT  19  /  AlkPhos  96  12-14      CARDIAC MARKERS ( 13 Dec 2018 12:00 )  0.042 ng/mL / x     / x     / x     / x            ABG - ( 13 Dec 2018 19:34 )  pH, Arterial: 7.33  pH, Blood: x     /  pCO2: 44    /  pO2: 165   / HCO3: 22    / Base Excess: -3.1  /  SaO2: 98                  LIVER FUNCTIONS - ( 14 Dec 2018 10:17 )  Alb: 2.2 g/dL / Pro: 6.3 gm/dL / ALK PHOS: 96 U/L / ALT: 19 U/L / AST: 18 U/L / GGT: x             PT/INR - ( 13 Dec 2018 12:00 )   PT: 12.2 sec;   INR: 1.10 ratio         PTT - ( 13 Dec 2018 12:00 )  PTT:25.7 sec    Urinalysis Basic - ( 13 Dec 2018 13:00 )    Color: Yellow / Appearance: Clear / S.015 / pH: x  Gluc: x / Ketone: Negative  / Bili: Negative / Urobili: Negative mg/dL   Blood: x / Protein: 30 mg/dL / Nitrite: Negative   Leuk Esterase: Negative / RBC: 3-5 /HPF / WBC 0-2   Sq Epi: x / Non Sq Epi: Few / Bacteria: Occasional

## 2018-12-14 NOTE — DIETITIAN INITIAL EVALUATION ADULT. - OTHER INFO
Pt seen for consult low PO intake for 5 days or greater. Pt intubated and sedated, unable to provide detailed diet information, no family at bedside. Pt pmhx noted for DMII, HTN, lung Ca stage IV with mets to neck s/p tumor removal of the neck on 10/30, presents to the [place] ED on 12/13/18 from home with Shortness of breath. Since s/x pt has been declining, Pt noted with poor appetite, weakness lethargy and generalized pain. Pt came to ed and had cardiac arrest, CPR performed pt intubated and admit to ICU. Pt currently on mechanical vent, sedated, DNR and family does not want further CPR. Per MD's note pt with guarded/poor prognosis. Limits set to care. Pt with SDTI on cervical cpine and lateral chest, mae 9, Recommend clarifying GOC for artificial nutrition. Reviewed pt h/x noted with weight gain (pt with 3+ edema in left and right hand, 2+ edema generalized, likely masking weight loss). Unable to assess pt with NFPE. Pt noted with poor PO intake since october (<75% of NN over 1 month period). Will f/u with family, Pt meets criteria for moderate protein-calorie malnutrition in the context of chronic illness. Recommend clarify GOC with family specifically artificial nutrition, reconsult RD as needed, monitor labs. Will continue to monitor pt's nutritional status.

## 2018-12-14 NOTE — DIETITIAN INITIAL EVALUATION ADULT. - ORAL INTAKE PTA
Discussed with RN:  pt's appetite has been progressively worse since october, with continued diminished appetite./poor

## 2018-12-14 NOTE — CONSULT NOTE ADULT - RS GEN PE MLT RESP DETAILS PC
no rhonchi/no rales/no wheezes/breath sounds equal/respirations non-labored/clear to auscultation bilaterally

## 2018-12-14 NOTE — CONSULT NOTE ADULT - ASSESSMENT
Pt 78y M PMHx of DMII, HTN, lung Ca stage IV with mets to neck s/p tumor removal of the neck on 10/30.  He was discharged to rehab for a few week and optimized and discharged home.  He was being followed by oncology with Dr Lam, started on chemo/RT.  He presents to the [place] ED on 12/13/18 from home with Shortness of breath, acute decline in his overall functional status, has become progressively weaker, associated with appetite suppression, increased lethargy, and generalized pain, controlled with Morphine and oxycodone.  Shortly after arrival to the ED the patient had an episode of cardiac arrest and became apneic. Patient was noticed to be in PEA, CPR was started patient was intubated by Pt was admitted to ICU on vent, dopamine.  Medicine consult called for comanagement.     * Acute Respiratory Failure-CT negative for PE ? primary cardiac event.  Continue vent support, wean per ICU.  * Cardiac Arrest-s/p CPR, continue dopamine and vee per ICU, echo, monitor in ICU.    * Metastatic Lung CA-progressing, is on chemo and RT.  Monitor for now, onc input noted.  Family does not want future CPR.      * Type 2 Diabetes- ISS for now   * GERD-continue Protonix  * Proph- Lovenox and Protonix   * Disp-over all poor prognosis, guarded, now DNR  * Comm- d/w family in details, all questions answered, emotional support provided. Pt 78y M PMHx of DMII, HTN, lung Ca stage IV with mets to neck s/p tumor removal of the neck on 10/30.  He was discharged to rehab for a few week and optimized and discharged home.  He was being followed by oncology with Dr Lam, started on chemo/RT.  He presents to the [place] ED on 12/13/18 from home with Shortness of breath, acute decline in his overall functional status, has become progressively weaker, associated with appetite suppression, increased lethargy, and generalized pain, controlled with Morphine and oxycodone.  Shortly after arrival to the ED the patient had an episode of cardiac arrest and became apneic. Patient was noticed to be in PEA, CPR was started patient was intubated by Pt was admitted to ICU on vent, dopamine.  Medicine consult called for comanagement.     * Acute Respiratory Failure-CT negative for PE ? primary cardiac event.  Continue vent support, empiric zosyn, wean per ICU.  * Cardiac Arrest-s/p CPR, continue dopamine and vee per ICU, echo, monitor in ICU.    * Metastatic Lung CA-progressing, is on chemo and RT.  Monitor for now, onc input noted.  Family does not want future CPR.      * Type 2 Diabetes- ISS for now   * GERD-continue Protonix  * Proph- Lovenox and Protonix   * Disp-over all poor prognosis, guarded, now DNR  * Comm- d/w family in details, all questions answered, emotional support provided.

## 2018-12-14 NOTE — PROGRESS NOTE ADULT - ASSESSMENT
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 alimta and keytruda on 12/7/2018.   in light of the recent events would continue to closely monitor for addtional 24- 48 hours  CT of the chest suggestive of POD   continue with supportive care  Patient now DNR   would continue with supportive management   will continue to follow daily   spoke with both sons and Daughter

## 2018-12-14 NOTE — CHART NOTE - NSCHARTNOTEFT_GEN_A_CORE
Upon Nutritional Assessment by the Registered Dietitian your patient was determined to meet criteria / has evidence of the following diagnosis/diagnoses:          [ ]  Mild Protein Calorie Malnutrition        [ ]  Moderate Protein Calorie Malnutrition        [ ] Severe Protein Calorie Malnutrition        [ ] Unspecified Protein Calorie Malnutrition        [ ] Underweight / BMI <19        [ ] Morbid Obesity / BMI > 40      Findings as based on:  •  Comprehensive nutrition assessment and consultation  •  Calorie counts (nutrient intake analysis)  •  Food acceptance and intake status from observations by staff  •  Follow up  •  Patient education  •  Intervention secondary to interdisciplinary rounds  •   concerns      Treatment:    The following diet has been recommended:  -Continue to monitor pt  -Reconsult RD if artificial nutrition is considered    PROVIDER Section:     By signing this assessment you are acknowledging and agree with the diagnosis/diagnoses assigned by the Registered Dietitian    Comments: Upon Nutritional Assessment by the Registered Dietitian your patient was determined to meet criteria / has evidence of the following diagnosis/diagnoses:          [ ]  Mild Protein Calorie Malnutrition        [x]  Moderate Protein Calorie Malnutrition        [ ] Severe Protein Calorie Malnutrition        [ ] Unspecified Protein Calorie Malnutrition        [ ] Underweight / BMI <19        [ ] Morbid Obesity / BMI > 40      Findings as based on:  •  Comprehensive nutrition assessment and consultation  •  Calorie counts (nutrient intake analysis)  •  Food acceptance and intake status from observations by staff  •  Follow up  •  Patient education  •  Intervention secondary to interdisciplinary rounds  •   concerns      Treatment:    The following diet has been recommended:  -Continue to monitor pt  -Reconsult RD if artificial nutrition is considered    PROVIDER Section:     By signing this assessment you are acknowledging and agree with the diagnosis/diagnoses assigned by the Registered Dietitian    Comments:

## 2018-12-14 NOTE — DIETITIAN INITIAL EVALUATION ADULT. - NS AS NUTRI DX NUTRIENT
Malnutrition/Meets criteria for moderate protein-calorie malnutrition in the context of chronic illness

## 2018-12-14 NOTE — DIETITIAN INITIAL EVALUATION ADULT. - PERTINENT LABORATORY DATA
12-14 Na139 mmol/L Glu 166 mg/dL<H> K+ 4.5 mmol/L Cr  0.89 mg/dL BUN 38 mg/dL<H> Phos n/a   Alb 2.2 g/dL<L> PAB n/a

## 2018-12-14 NOTE — PROGRESS NOTE ADULT - ASSESSMENT
IMP:    79 y/o male with HTN, DM, stage 4 NSCLA with recent C-spine surgery for mets admitted with acute resp failure and PEA cardiac arrest on vasoactive meds, Dopa gtt  Doubt infectious etiology--Prob cardiogenic shock    Plan:    Check TTE  Full vent support--LPV--not candidate for weaning  Cont with empiric abx while awaiting for Cx results  Dopa gtt to keep MAP > 60  DC IO today  FS with insulin--keep FS < 190  DVT prophy--SCD and LMWH  Will obtain NSGY/spine surgery if pt becomes a potential OR candidate    ICU care--d/w ICU staff and family at bedside-- All concerns addressed   Above also d/w Onc and Dr cervantes

## 2018-12-14 NOTE — DIETITIAN INITIAL EVALUATION ADULT. - PERTINENT MEDS FT
MEDICATIONS  (STANDING):  chlorhexidine 0.12% Liquid 15 milliLiter(s) Oral Mucosa two times a day  dextrose 5%. 1000 milliLiter(s) (50 mL/Hr) IV Continuous <Continuous>  dextrose 50% Injectable 12.5 Gram(s) IV Push once  dextrose 50% Injectable 25 Gram(s) IV Push once  dextrose 50% Injectable 25 Gram(s) IV Push once  DOPamine Infusion 10 MICROgram(s)/kG/Min (30.975 mL/Hr) IV Continuous <Continuous>  enoxaparin Injectable 40 milliGRAM(s) SubCutaneous every 24 hours  influenza   Vaccine 0.5 milliLiter(s) IntraMuscular once  insulin lispro (HumaLOG) corrective regimen sliding scale   SubCutaneous every 6 hours  pantoprazole  Injectable 40 milliGRAM(s) IV Push daily  phenylephrine    Infusion 0.1 MICROgram(s)/kG/Min (3.097 mL/Hr) IV Continuous <Continuous>  piperacillin/tazobactam IVPB. 3.375 Gram(s) IV Intermittent every 8 hours  sodium chloride 0.9%. 1000 milliLiter(s) (75 mL/Hr) IV Continuous <Continuous>    MEDICATIONS  (PRN):  acetaminophen  Suppository .. 650 milliGRAM(s) Rectal every 6 hours PRN Temp greater or equal to 38.5C (101.3F)  dextrose 40% Gel 15 Gram(s) Oral once PRN Blood Glucose LESS THAN 70 milliGRAM(s)/deciliter  glucagon  Injectable 1 milliGRAM(s) IntraMuscular once PRN Glucose LESS THAN 70 milligrams/deciliter  HYDROmorphone  Injectable 1 milliGRAM(s) IV Push every 4 hours PRN Moderate Pain (4 - 6)  LORazepam   Injectable 2 milliGRAM(s) IV Push every 3 hours PRN Agitation

## 2018-12-14 NOTE — DIETITIAN INITIAL EVALUATION ADULT. - ENERGY NEEDS
Ht: 68in; Wt: 90kg; BMI: 30.1; IBW: 70kg; IBW%:128%    Calorie Needs: 11-14 kcal/k- 1260kcal  Protein needs: 2.0 g/kg

## 2018-12-15 NOTE — CONSULT NOTE ADULT - SUBJECTIVE AND OBJECTIVE BOX
CHIEF COMPLAINT: Not moving extremities    HPI: Patient is 78 year old with history of metastaic Lung CA. He was initially seem in [facility] in October. Work up at that time demonstrated diffuse metastatic disease with large lesion involving the arch and lateral mass of C1. At patient's request he was transferred to [facility] in [facility] and underwent cervical spine surgery on 10/30/18. He was subsequently transferred to rehab and eventually home. He has been undergoing RT and chemo. According to the patient's wife he has been getting progressively weak and SOB. He was taken to [facility] on 12/13 when he went into cardiac arrest. CPR was performed and patient was intubated. He has remained sedated until this morning when he was awake it was noticed that he could not move any of his extremities. He could only shrug his shoulders.    PAST MEDICAL & SURGICAL HISTORY:  Lung cancer: Stage IV with distant mets  Essential hypertension  Type 2 diabetes mellitus without complication, without long-term current use of insulin  H/O neck surgery: Tumor Resection      FAMILY HISTORY:  Family history of Parkinson disease (Father): Father      SOCIAL HISTORY: Non smoker, denies use of alcohol or drug products    REVIEW OF SYSTEMS:    CONSTITUTIONAL: No fever, weight loss, or fatigue  HEENT: Normal extraoccular movements,   NECK: See HPI  RESPIRATORY: No cough, wheezing, chills or hemoptysis; No shortness of breath  CARDIOVASCULAR: No chest pain, palpitations, dizziness, or leg swelling  GASTROINTESTINAL: No abdominal or epigastric pain. No nausea, vomiting, or hematemesis; No diarrhea or constipation. No melena or hematochezia.  GENITOURINARY: No dysuria, frequency, hematuria, or incontinence  NEUROLOGICAL: See HPI  SKIN: No itching, burning, rashes, or lesions   LYMPH NODES: No enlarged glands  ENDOCRINE: No heat or cold intolerance; No hair loss  MUSCULOSKELETAL: See HPI  PSYCHIATRIC: No depression, anxiety, mood swings, or difficulty sleeping  HEME/LYMPH: No easy bruising, or bleeding gums      Vital Signs Last 24 Hrs  T(C): 36.3 (15 Dec 2018 10:00), Max: 38.7 (14 Dec 2018 16:00)  T(F): 97.4 (15 Dec 2018 10:00), Max: 101.6 (14 Dec 2018 16:00)  HR: 96 (15 Dec 2018 11:00) (86 - 132)  BP: 121/50 (15 Dec 2018 11:00) (83/50 - 141/57)  BP(mean): 66 (15 Dec 2018 11:00) (57 - 116)  RR: 14 (15 Dec 2018 11:00) (13 - 22)  SpO2: 99% (15 Dec 2018 11:00) (97% - 100%)  I&O's Detail    14 Dec 2018 07:01  -  15 Dec 2018 07:00  --------------------------------------------------------  IN:    DOPamine Infusion: 657.2 mL    IV PiggyBack: 200 mL    sodium chloride 0.9%.: 1725 mL  Total IN: 2582.2 mL    OUT:    Indwelling Catheter - Urethral: 950 mL  Total OUT: 950 mL    Total NET: 1632.2 mL      15 Dec 2018 07:01  -  15 Dec 2018 13:51  --------------------------------------------------------  IN:    DOPamine Infusion: 58.9 mL    sodium chloride 0.9%.: 225 mL  Total IN: 283.9 mL    OUT:  Total OUT: 0 mL    Total NET: 283.9 mL          LABS:                        8.9    1.07  )-----------( 199      ( 15 Dec 2018 07:13 )             28.7     12-15    139  |  109<H>  |  36<H>  ----------------------------<  148<H>  3.6   |  23  |  0.82    Ca    8.8      15 Dec 2018 07:13  Phos  3.2     12-15  Mg     2.0     12-15    TPro  6.3  /  Alb  2.2<L>  /  TBili  0.6  /  DBili  0.3<H>  /  AST  18  /  ALT  19  /  AlkPhos  96  12-14      PHYSICAL EXAM:  General; Intubated, sedated for MRI.  HEENT: Unremarkable  Neck: (+)Cervical brace with thoracic extension  Extremities:   Neuro exam could not be performed due to patient's sedation      A/P :  Metastatic Lung CA / s/p Cervical spine surgery / Not moving Extremities    - Await completion of MRI of cervical / thoracic spine.  - Also ordered CT scan to evaluate spinal instrumentation  - Continue treatment as per Critical Care / oncology

## 2018-12-15 NOTE — PROGRESS NOTE ADULT - ASSESSMENT
Pt 78y M PMHx of DMII, HTN, lung Ca stage IV with mets to neck s/p tumor removal of the neck on 10/30.  He was discharged to rehab for a few week and optimized and discharged home.  He was being followed by oncology with Dr Lam, started on chemo/RT.  He presents to the [place] ED on 12/13/18 from home with Shortness of breath, acute decline in his overall functional status, has become progressively weaker, associated with appetite suppression, increased lethargy, and generalized pain, controlled with Morphine and oxycodone.  Shortly after arrival to the ED the patient had an episode of cardiac arrest and became apneic. Patient was noticed to be in PEA, CPR was started patient was intubated by Pt was admitted to ICU on vent, dopamine.  Medicine consult called for comanagement.     * Acute Respiratory Failure-CT negative for PE ? primary cardiac event.  Continue vent support, empiric zosyn, wean per ICU.  * PARALYSIS - NOT SEDATED AND OPENS EYES, BUT NOT MOVING ANY EXTREMITIES SPONTANEOUSLY -- CHECK STS BRAIN AND CERVICAL MRI; sPINE EVAL  * Cardiac Arrest-s/p CPR, continue dopamine and vee per ICU, echo, monitor in ICU.    * Metastatic Lung CA-progressing, is on chemo and RT.  Monitor for now, onc input noted.  Family does not want future CPR.      * Type 2 Diabetes- ISS for now   * GERD-continue Protonix  * Proph- Lovenox and Protonix   * Disp-over all poor prognosis, guarded, now DNR  * Comm- d/w Dr. Hernandez and Dr. Saeed

## 2018-12-15 NOTE — BRIEF OPERATIVE NOTE - PRE-OP DX
Post laminectomy syndrome  12/15/2018  Post operative fluid collection with cord compression / quadraplegia  Active  Mannie Hernandez

## 2018-12-15 NOTE — PROGRESS NOTE ADULT - ASSESSMENT
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 alimta and keytruda on 12/7/2018.   in light of the recent events would continue to closely monitor for addtional 24- 48 hours  CT of the chest suggestive of POD   now found to have Cord Compression   neutropenia secondary to chemotherapy induced pancytopenia - Zarxio has been given prior to or  TSH and Cortisol pending  would continue with supportive management   will continue to follow daily   spoke with both sons and Daughter earlier today

## 2018-12-15 NOTE — PROGRESS NOTE ADULT - ASSESSMENT
IMP:    77 y/o male with HTN, DM, stage 4 NSCLA with recent C-spine surgery for mets admitted with acute resp failure and PEA cardiac arrest on vasoactive meds, Dopa gtt  Doubt infectious etiology--Prob cardiogenic shock  R/O C- spine injury     Poor outlook    Plan:    Obtain Spine eval as d/w dr robin who will call dr marrero  Full vent support--LPV--SBT but do not anticipate extubation  Cont with empiric abx while awaiting for Cx results  Dopa gtt to keep MAP > 60  FS with insulin--keep FS < 18  DVT prophy--SCD and LMWH    ICU care--d/w ICU staff and family at bedside-- All concerns addressed

## 2018-12-15 NOTE — BRIEF OPERATIVE NOTE - PROCEDURE
<<-----Click on this checkbox to enter Procedure Cervical laminectomy  12/15/2018  Exploration of fusion, bilateral revision cervical laminectomy C3-4, I&D  Active  VERONICAI

## 2018-12-16 NOTE — DISCHARGE NOTE FOR THE EXPIRED PATIENT - HOSPITAL COURSE
78 year old male admitted on  with sob, patient had history of metastatic lung cancer  recently had cervical resection  resection fusion at another institution, presented with sob, He had  cardiac arrest and was intubated.  post arrest patient was noted to have quadriparesis, mri showed fluid collection  at c3. he went to OR for decompression.  Post procedure patient remained quadriparetic.  family withdrew care and patient  on 10/19 at 1015 pm

## 2018-12-16 NOTE — PROGRESS NOTE ADULT - ASSESSMENT
IMP:    79 y/o male with HTN, DM, stage 4 NSCLA with recent C-spine surgery for mets admitted with acute resp failure and PEA cardiac arrest on vasoactive meds, Dopa gtt which has been off since 12/15    Doubt infectious etiology--Prob cardiogenic shock  C spine spinal chord compression from fluid collection s/p decompression--no motor recovery as of this morning  Leukopenia secondary to ChemoRx    Poor outlook    Plan:    Full vent support--LPV--Decrease FIO2 as tolerated (FIO2 80%)--not candidate for extubation  Add lantus 5 qd to FS with insulin coverage--keep FS < 180  S/P neupogen on 12/15  Steroids as per Spine team  Cont with empiric abx (zosyn) (3) while awaiting for Cx results  Observe off pressors  NPO  IVF  DVT prophy--SCD   As d/w dr diaz, needs GOC conversation with family as repeat imaging reveals progression of Ca every where    ICU care--d/w ICU staff and family at bedside-- All concerns addressed

## 2018-12-16 NOTE — PROGRESS NOTE ADULT - ASSESSMENT
Pt 78y M PMHx of DMII, HTN, lung Ca stage IV with mets to neck s/p tumor removal of the neck on 10/30.  He was discharged to rehab for a few week and optimized and discharged home.  He was being followed by oncology with Dr Lam, started on chemo/RT.  He presents to the [place] ED on 12/13/18 from home with Shortness of breath, acute decline in his overall functional status, has become progressively weaker, associated with appetite suppression, increased lethargy, and generalized pain, controlled with Morphine and oxycodone.  Shortly after arrival to the ED the patient had an episode of cardiac arrest and became apneic. Patient was noticed to be in PEA, CPR was started patient was intubated by Pt was admitted to ICU on vent, dopamine.  Medicine consult called for comanagement. Course complicated by cord compression from post op fluid collection causing paralysis.    *cord compression - due to post op fluid collection  s/p evacuation  As of this am, no motor movement still - will need to await and see if anything regained    * Acute Respiratory Failure-CT negative for PE ? primary cardiac event.  Continue vent support, empiric zosyn, wean per ICU.  * Cardiac Arrest-s/p CPR, continue dopamine and vee per ICU, echo, monitor in ICU.  Wean off dopamine  * Metastatic Lung CA-progressing, is on chemo and RT.  Monitor for now, onc input noted.  Family does not want future CPR.      * Type 2 Diabetes- ISS for now   * GERD-continue Protonix  * Proph- Lovenox and Protonix   * Disp-over all poor prognosis, guarded, now DNR  * Comm- d/w Dr. Hernandez and Pt's family

## 2018-12-17 NOTE — PROGRESS NOTE ADULT - ASSESSMENT
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 alimta and keytruda on 12/7/2018.   care has been shifted to symptom based therapy currently more responsive yet still plegic   discussed with spouse earlier this am   will continue to follow daily   spoke with both son and Daughter

## 2018-12-17 NOTE — PROGRESS NOTE ADULT - ASSESSMENT
Pt 78y M PMHx of DMII, HTN, lung Ca stage IV with mets to neck s/p tumor removal of the neck on 10/30.  He was discharged to rehab for a few week and optimized and discharged home.  He was being followed by oncology with Dr Lam, started on chemo/RT.  He presents to the [place] ED on 12/13/18 from home with Shortness of breath, acute decline in his overall functional status, has become progressively weaker, associated with appetite suppression, increased lethargy, and generalized pain, controlled with Morphine and oxycodone.  Shortly after arrival to the ED the patient had an episode of cardiac arrest and became apneic. Patient was noticed to be in PEA, CPR was started patient was intubated by Pt was admitted to ICU on vent, dopamine.  Medicine consult called for comanagement.     * Acute Respiratory Failure-CT negative for PE ? primary cardiac event.  Continue vent support, empiric zosyn.  Family leaning towards terminal extubation.  * Pancytopenia-due to chemo, supportive care, neupogen  * Quadriplegia- due to post op fluid collection with cord compression, s/p evacuation, continue steroids.    * Cardiac Arrest-s/p CPR, continue supportive care per ICU   * Metastatic Lung CA-progressing, is on chemo and RT.  Monitor for now, onc input noted.  Family does not want future CPR.      * Type 2 Diabetes- ISS for now   * GERD-continue Protonix  * Proph- venodynes and Protonix   * Disp-over all poor prognosis, guarded, now DNR, family leaning towards comfort care  * Comm- d/w wife in details, all questions answered, emotional support provided.

## 2018-12-17 NOTE — PROGRESS NOTE ADULT - ASSESSMENT
Pt 78y M PMHx of DMII, HTN, lung Ca stage IV with mets to neck s/p tumor removal of the neck on 10/30.  He was discharged to rehab for a few week and optimized and discharged home.  He was being followed by oncology with Dr Lam, started on chemo/RT.  He presents to the [place] ED on 12/13/18 from home with Shortness of breath, acute decline in his overall functional status, has become progressively weaker, associated with appetite suppression, increased lethargy, and generalized pain, controlled with Morphine and oxycodone.  Shortly after arrival to the ED the patient had an episode of cardiac arrest and became apneic. Patient was noticed to be in PEA, CPR was started patient was intubated by Pt was admitted to ICU on vent, dopamine.  Medicine consult called for comanagement. Course complicated by cord compression from post op fluid collection causing paralysis.    *cord compression - due to post op fluid collection  s/p evacuation 12/15/18  no motor movement still - will need to await and see if anything regained      * Acute Respiratory Failure-CT negative for PE ? primary cardiac event.  Continue vent support, empiric zosyn, wean per ICU.  * Cardiac Arrest-s/p CPR, continue dopamine and vee per ICU, echo, monitor in ICU.  Wean off dopamine  * Metastatic Lung CA-progressing, is on chemo and RT.  Monitor for now, onc input noted.  Family does not want future CPR.     * Anemia-monitor H&H   * Type 2 Diabetes- ISS for now   * GERD-continue Protonix  * Proph- Lovenox and Protonix

## 2018-12-17 NOTE — PROGRESS NOTE ADULT - ASSESSMENT
IMP:    79 y/o male with HTN, DM, stage 4 NSCLA with recent C-spine surgery for mets admitted with acute resp failure and PEA cardiac arrest on vasoactive meds, Dopa gtt which has been off since 12/15    Doubt infectious etiology--Prob cardiogenic shock  C spine spinal chord compression from fluid collection s/p decompression--no motor recovery  Leukopenia secondary to ChemoRx    Poor outlook    Plan:    Full vent support--LPV--Decrease FIO2 as tolerated   lantus 5 qd to FS with insulin coverage--keep FS < 180  S/P neupogen on 12/15  Steroids as per Spine team  Cont with empiric abx (zosyn) (3) while awaiting for Cx results  Observe off pressors  NPO  IVF  DVT prophy--SCD     D/W the patients wife.  She would like to give it one more day.

## 2018-12-18 NOTE — PROGRESS NOTE ADULT - ASSESSMENT
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 alimta and keytruda on 12/7/2018.   care has been shifted to symptom based therapy currently more responsive yet still plegic   c/w zarxio   will hold on prbc  chemotherapy induced pancytopenia   will continue to follow daily   spoke with spouse   palliative cx pending

## 2018-12-18 NOTE — PROGRESS NOTE ADULT - ASSESSMENT
Pt 78y M PMHx of DMII, HTN, lung Ca stage IV with mets to neck s/p tumor removal of the neck on 10/30.  He was discharged to rehab for a few week and optimized and discharged home.  He was being followed by oncology with Dr Lam, started on chemo/RT.  He presents to the [place] ED on 12/13/18 from home with Shortness of breath, acute decline in his overall functional status, has become progressively weaker, associated with appetite suppression, increased lethargy, and generalized pain, controlled with Morphine and oxycodone.  Shortly after arrival to the ED the patient had an episode of cardiac arrest and became apneic. Patient was noticed to be in PEA, CPR was started patient was intubated by Pt was admitted to ICU on vent, dopamine.  Medicine consult called for comanagement. Course complicated by cord compression from post op fluid collection causing paralysis.    *cord compression - due to post op fluid collection  s/p evacuation 12/15/18  no motor movement still - will need to await and see if anything regained  monitor Hvac      * Acute Respiratory Failure- primary cardiac event.  Continue vent support, empiric zosyn, wean per ICU. Extubation as per Intensivist  * Cardiac Arrest-s/p CPR, continue dopamine and vee per ICU, echo, monitor in ICU.  Wean off dopamine  * Metastatic Lung CA-progressing, is on chemo and RT.  Monitor for now, onc input noted.  Family does not want future CPR.     * Anemia-monitor H&H   * Type 2 Diabetes- ISS for now   * GERD-continue Protonix  * Proph- Lovenox and Protonix

## 2018-12-18 NOTE — PROGRESS NOTE ADULT - ASSESSMENT
IMP:    77 y/o male with HTN, DM, stage 4 NSCLA with recent C-spine surgery for mets admitted with acute resp failure and PEA cardiac arrest on vasoactive meds, Dopa gtt which has been off since 12/15    Doubt infectious etiology--Prob cardiogenic shock  C spine spinal chord compression from fluid collection s/p decompression--no motor recovery  Leukopenia secondary to ChemoRx    Poor outlook    Plan:    Full vent support--LPV- Increasing Fi O2.  Check a CXR now.    Resumed his medications except zosyn as the family is not withdrawing care at this time   lantus 5 qd to FS with insulin coverage--keep FS < 180  S/P neupogen on 12/15  Steroids as per Spine team  NPO  IVF  DVT prophy--SCD   Called a palliative care consult  Will D/W The patients wife if she wishes him to have blood  D/W the patients wife.

## 2018-12-18 NOTE — PROGRESS NOTE ADULT - ASSESSMENT
Pt 78y M PMHx of DMII, HTN, lung Ca stage IV with mets to neck s/p tumor removal of the neck on 10/30.  He was discharged to rehab for a few week and optimized and discharged home.  He was being followed by oncology with Dr Lam, started on chemo/RT.  He presents to the [place] ED on 12/13/18 from home with Shortness of breath, acute decline in his overall functional status, has become progressively weaker, associated with appetite suppression, increased lethargy, and generalized pain, controlled with Morphine and oxycodone.  Shortly after arrival to the ED the patient had an episode of cardiac arrest and became apneic. Patient was noticed to be in PEA, CPR was started patient was intubated by Pt was admitted to ICU on vent, dopamine.  Medicine consult called for comanagement.     * Acute Respiratory Failure-CT negative for PE ? primary cardiac event.  Continue vent support, empiric zosyn.  Family leaning towards terminal extubation in the next 1-2 days.  * Pancytopenia-due to chemo, supportive care, Neupogen  * Quadriplegia- due to post op fluid collection with cord compression, s/p evacuation, continue steroids.    * Cardiac Arrest-s/p CPR, continue supportive care per ICU   * Metastatic Lung CA-progressing, is on chemo and RT.  Monitor for now, onc input noted.  Family does not want future CPR.      * Type 2 Diabetes- ISS for now   * GERD-continue Protonix  * Proph- venodynes and Protonix   * Disp-over all poor prognosis, guarded, now DNR, family leaning towards comfort care in 1-2 days, agree with palliative care eval  * Comm- d/w wife and daughter in details, all questions answered, emotional support provided, Dr Patel, RN Pt 78y M PMHx of DMII, HTN, lung Ca stage IV with mets to neck s/p tumor removal of the neck on 10/30.  He was discharged to rehab for a few week and optimized and discharged home.  He was being followed by oncology with Dr Lam, started on chemo/RT.  He presents to the [place] ED on 12/13/18 from home with Shortness of breath, acute decline in his overall functional status, has become progressively weaker, associated with appetite suppression, increased lethargy, and generalized pain, controlled with Morphine and oxycodone.  Shortly after arrival to the ED the patient had an episode of cardiac arrest and became apneic. Patient was noticed to be in PEA, CPR was started patient was intubated by Pt was admitted to ICU on vent, dopamine.  Medicine consult called for comanagement.     * Acute Respiratory Failure-CT negative for PE ? primary cardiac event.  Continue vent support, empiric zosyn.  Family leaning towards terminal extubation in the next 1-2 days.  * Pancytopenia-due to chemo, supportive care, Neupogen  * Quadriplegia- due to post op fluid collection with cord compression, s/p evacuation, continue steroids.    * Cardiac Arrest-s/p CPR, continue supportive care per ICU   * Metastatic Lung CA-progressing, is on chemo and RT.  Monitor for now, onc input noted.  Family does not want future CPR.      * Type 2 Diabetes- ISS for now   * GERD-continue Protonix  * Proph- lovenox and Protonix   * Disp-over all poor prognosis, guarded, now DNR, family leaning towards comfort care in 1-2 days, agree with palliative care eval  * Comm- d/w wife and daughter in details, all questions answered, emotional support provided, Dr Patel, RN

## 2018-12-19 NOTE — PROGRESS NOTE ADULT - ASSESSMENT
Pt 78y M PMHx of DMII, HTN, lung Ca stage IV with mets to neck s/p tumor removal of the neck on 10/30.  He was discharged to rehab for a few week and optimized and discharged home.  He was being followed by oncology with Dr Lam, started on chemo/RT.  He presents to the [place] ED on 12/13/18 from home with Shortness of breath, acute decline in his overall functional status, has become progressively weaker, associated with appetite suppression, increased lethargy, and generalized pain, controlled with Morphine and oxycodone.  Shortly after arrival to the ED the patient had an episode of cardiac arrest and became apneic. Patient was noticed to be in PEA, CPR was started patient was intubated by Pt was admitted to ICU on vent, dopamine.  Medicine consult called for comanagement. Course complicated by cord compression from post op fluid collection causing paralysis.    *cord compression - due to post op fluid collection  s/p evacuation 12/15/18  no motor movement still - will need to await and see if anything regained  monitor Hvac      * Acute Respiratory Failure- primary cardiac event.  Continue vent support, empiric zosyn, wean per ICU. Terminal Extubation as per Intensivist  * Cardiac Arrest-s/p CPR, continue dopamine and vee per ICU, echo, monitor in ICU.  Wean off dopamine  * Metastatic Lung CA-progressing, is on chemo and RT.  Monitor for now, onc input noted.  Family does not want future CPR.     * Anemia-monitor H&H   * Type 2 Diabetes- ISS for now   * GERD-continue Protonix  * Proph- Lovenox and Protonix

## 2018-12-19 NOTE — PROGRESS NOTE ADULT - GASTROINTESTINAL DETAILS
soft/no distention/nontender/bowel sounds normal
soft/no distention/nontender
soft/nontender/no distention
no distention/soft/nontender
soft/nontender/no distention
bowel sounds normal/soft
no distention/soft
no distention/bowel sounds normal/soft/nontender
no distention/soft/bowel sounds normal/nontender

## 2018-12-19 NOTE — CONSULT NOTE ADULT - ASSESSMENT
Patient is a 78y M PMHx of DMII, HTN, lung Ca stage IV with mets to neck s/p tumor removal of the neck on 10/30.  He was discharged to rehab for a few week and optimized and discharged home.  He was being followed by oncology with Dr Lam, started on chemo/RT.  He presents to the [place] ED on 12/13/18 from home with Shortness of breath, acute decline in his overall functional status, has become progressively weaker, associated with appetite suppression, increased lethargy, and generalized pain.  Shortly after arrival to the ED the patient had an episode of cardiac arrest and became apneic. Patient was noticed to be in PEA, CPR was started patient was intubated by Pt was admitted to ICU on vent.   Palliative medicine Consult to establish GOC.  12/18/18 Seen and examined at bedside with wife in room. Pt remains on vent support with cont Fentanyl infusion maintained. Eyes open intermittently.       Assessment and Plan:    1) Acute Cardiac Resp Failure  -Intubated to vent support  -S/P CPR  -Failing DWT  -Cont supportive care    2) Lung Cancer  -Metastasis to neck  -S/P tumor removal of the cervical neck on 10/30  -Quadriplegia- due to post op fluid collection with cord compression  -s/p evacuation, continue steroids.    -on chemo at adm  -Onc eval noted    3) Quadriplegia  -S/P fluid collection eval with cord compression 12/15  -No spontaneous movement x 4 ext  -Ortho eval noted  -cont supportive care    4) Advanced Directives  -Pt without capacity  -Wife Aidehri surrogate  -DNR on chart  -GOC discussion 12/19/18 at 230p Patient is a 78y M PMHx of DMII, HTN, lung Ca stage IV with mets to neck s/p tumor removal of the neck on 10/30.  He was discharged to rehab for a few week and optimized and discharged home.  He was being followed by oncology with Dr Lam, started on chemo/RT.  He presents to the [place] ED on 12/13/18 from home with Shortness of breath, acute decline in his overall functional status, has become progressively weaker, associated with appetite suppression, increased lethargy, and generalized pain.  Shortly after arrival to the ED the patient had an episode of cardiac arrest and became apneic. Patient was noticed to be in PEA, CPR was started patient was intubated by Pt was admitted to ICU on vent.   Palliative medicine Consult to establish GOC.  12/18/18 Seen and examined at bedside with wife in room. Pt remains on vent support with cont Fentanyl infusion maintained. Eyes open intermittently.       Assessment and Plan:    1) Acute Cardiac Resp Failure  -Intubated to vent support  -S/P CPR  -Failing DWT  -Cont supportive care    2) Lung Cancer  -Metastasis to neck  -S/P tumor removal of the cervical neck on 10/30  -Quadriplegia- due to post op fluid collection with cord compression  -s/p evacuation, continue steroids.    -on chemo at adm  -Onc eval noted    3) Quadriplegia  -S/P fluid collection eval with cord compression 12/15  -No spontaneous movement x 4 ext  -Ortho eval noted  -cont supportive care    4) Pain/Anxiety  -Maintain cont Fentanyl infusion  -Cont Dilaudid 1 mg IVP Q30 minutes as per ICU  -Cont Ativan 2 mg IVP Q1H PRN anxiety    5) Advanced Directives  -Pt without capacity  -Wife Fakhri surrogate  -DNR on chart  -GOC discussion 12/19/18 at 230p

## 2018-12-19 NOTE — PROGRESS NOTE ADULT - MS EXT PE MLT D E PC
no cyanosis/no clubbing/pedal edema
no pedal edema/no cyanosis
no clubbing/no cyanosis/no pedal edema
no cyanosis/no pedal edema
no cyanosis/no pedal edema
no clubbing/no cyanosis/pedal edema
no cyanosis/no clubbing

## 2018-12-19 NOTE — CHART NOTE - NSCHARTNOTEFT_GEN_A_CORE
The patient family had a meeting with palliative care and will terminal wean at this time.      D/C all meds    Continue Fentanyl drip, PRN Dilaudid and Ativan

## 2018-12-19 NOTE — PROGRESS NOTE ADULT - ASSESSMENT
IMP:    79 y/o male with HTN, DM, stage 4 NSCLA with recent C-spine surgery for mets admitted with acute resp failure and PEA cardiac arrest on vasoactive meds, Dopa gtt which has been off since 12/15    Doubt infectious etiology--Prob cardiogenic shock  C spine spinal chord compression from fluid collection s/p decompression--no motor recovery  Leukopenia secondary to ChemoRx    Poor outlook    Plan:    Full vent support  lantus 5 qd to FS with insulin coverage--keep FS < 180  S/P neupogen on 12/15  Steroids as per Spine team  NPO  IVF  DVT prophy--SCD   palliative care consult at 2:30 PM today  Possible withdraw car today    D/W the patients wife.

## 2018-12-19 NOTE — GOALS OF CARE CONVERSATION - PERSONAL ADVANCE DIRECTIVE - CONVERSATION DETAILS
The role of Palliative Medicine was reviewed. The patient's wife and children discussed the events leading to this hospitalization and that they plan to compassionately liberate from the ventilator. The patient's wife is struggling with when that will occur.  We reviewed the process and assured her that we will focus on his comfort. He is currently on cont Fentanyl infusion and this will be adjusted as pt's symptoms require. DNR on chart. Plan discussed with bedside RUSSEL Maher and Dr Patel. 35 minutes spent discussing advanced care planning.

## 2018-12-19 NOTE — PROGRESS NOTE ADULT - RS GEN PE MLT RESP DETAILS PC
breath sounds equal/no wheezes/no rhonchi/no rales
breath sounds equal/good air movement
breath sounds equal/no rales/no rhonchi/no wheezes
good air movement/breath sounds equal
no rales/no rhonchi/no wheezes/breath sounds equal
respirations non-labored/no rhonchi/no wheezes/breath sounds equal/no rales
no rhonchi/respirations non-labored/breath sounds equal/no rales/no wheezes
breath sounds equal/good air movement
no rhonchi/no wheezes/respirations non-labored/breath sounds equal/no rales

## 2018-12-19 NOTE — PROGRESS NOTE ADULT - ASSESSMENT
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 alimta and keytruda on 12/7/2018.   care has been shifted to symptom based therapy currently more responsive yet still plegic   c/w zarxio   discussed with spouse and is likely to plan for palliative liberation from mechanica vent today.   chemotherapy induced pancytopenia   will continue to follow daily

## 2018-12-19 NOTE — CHART NOTE - NSCHARTNOTEFT_GEN_A_CORE
HPI:  Patient is a 78y M PMHx of DMII, HTN, lung Ca stage IV with mets to neck s/p tumor removal of the neck on 10/30, presents to the [facility] ED on 12/13/18 from home with Shortness of breath. S/P surgery, patient spent three weeks in [facility] and was discharged day before thanksgiving. Over this time after surgery the patent has been started on chemotherapy and has since seen an acute decline in his overall functional status, has become progressively weaker, associated with appetite suppression, increased lethargy, and generalized pain, controlled with Morphine and oxycodone. Shortly after arrival to the ED the patient had an episode of cardiac arrest and became apneic. Patient was noticed to be in PEA, CPR was started patient was intubated by Anesthesia and placed on Propofol. ICU was consulted, seen at bedside, patient hemodynamically stable on Mechanical Vent. (13 Dec 2018 13:18)      PERTINENT PMH REVIEWED:  [ X ] YES [ ] NO           Primary Contact:   Graham Grover                Relationship: wife           HCP/Surrogate:  Surrogate               Phone Number: [phone]    HCP [  ] Surrogate [  X ] Guardian [   ]    Mental Status: sedated on ventilator   Concerns of Depression [  ]- unable to identify   Anxiety [   ]- unable to identify   Baseline ADLs (prior to admission):  Independent [ X ] moderately [ ] fully   Dependent   [ ] moderately [ ]fully    Family Meeting attendees: Pts wife, Eugenie Carranza NP, Sy Vyas Palliative LMSW    Anticipated Grief: Patient [  ] Family [ X ]    Goals of Care: Comfort [ X ] Rehabilitation [  ] Curative [  ] Life Prolonging [  ]    ADVANCE DIRECTIVES: Pt. is a DNR. Pt. is currently intubated but plan is for compassionate liberation     Anticipated D/C Plan:      Summary:    Team met with pts family to discuss goals of care, assist with planning and provide supportive counseling. Pt. resides home with wife and was on chemotherapy.    Pts current medical condition and goals of care discussed. Pts family aware that there are no further treatment options for pts Cancer. Pt. is currently intubated and unable to be weaned off. Pts family was clear that they are planning on compassionately weaning pt. from the ventilator, possibly today, however pts wife is struggling. We reviewed the process with pts family. Pts wife was very tearful and have a hard time deciding exactly when to do so. She was expressing guilt which team explored these feelings and offered support.     They plan to compassionately liberate pt. from the ventilator however, they have not picked exactly when. They will let staff know when they are ready. Emotional support provided. Our team will continue.

## 2018-12-19 NOTE — PROGRESS NOTE ADULT - PROVIDER SPECIALTY LIST ADULT
Anesthesia
Critical Care
Heme/Onc
Internal Medicine
Orthopedics
Heme/Onc
Critical Care
Critical Care
Internal Medicine
Critical Care

## 2018-12-19 NOTE — CONSULT NOTE ADULT - SUBJECTIVE AND OBJECTIVE BOX
HPI: Patient is a 78y M PMHx of DMII, HTN, lung Ca stage IV with mets to neck s/p tumor removal of the neck on 10/30.  He was discharged to rehab for a few week and optimized and discharged home.  He was being followed by oncology with Dr Lam, started on chemo/RT.  He presents to the [facility] ED on 12/13/18 from home with Shortness of breath, acute decline in his overall functional status, has become progressively weaker, associated with appetite suppression, increased lethargy, and generalized pain.  Shortly after arrival to the ED the patient had an episode of cardiac arrest and became apneic. Patient was noticed to be in PEA, CPR was started patient was intubated by Pt was admitted to ICU on vent, dopamine.     Palliative medicine Consult to establish GOC.      PAIN: ( )Yes   ( )No  Level:  Location:  Intensity:    /10  Quality:  Aggravating Factors:  Alleviating Factors:  Radiation:  Duration/Timing:  Impact on ADLs:    DYSPNEA: ( ) Yes  ( ) No  Level:    PAST MEDICAL & SURGICAL HISTORY:  Lung cancer: Stage IV with distant mets  Essential hypertension  Type 2 diabetes mellitus without complication, without long-term current use of insulin  H/O neck surgery: Tumor Resection      SOCIAL HX:    Hx opiate tolerance ( )YES  ( )NO    Baseline ADLs  (Prior to Admission)  ( ) Independent   ( )Dependent    FAMILY HISTORY:  Family history of Parkinson disease (Father): Father      Review of Systems:    Anxiety-  Depression-  Physical Discomfort-  Dyspnea-  Constipation-  Diarrhea-  Nausea-  Vomiting-  Anorexia-  Weight Loss-   Cough-  Secretions-  Fatigue-  Weakness-  Delirium-    All other systems reviewed and negative  Unable to obtain/Limited due to:      PHYSICAL EXAM:    Vital Signs Last 24 Hrs  T(C): 37.3 (19 Dec 2018 08:00), Max: 37.9 (19 Dec 2018 04:00)  T(F): 99.2 (19 Dec 2018 08:00), Max: 100.2 (19 Dec 2018 04:00)  HR: 70 (19 Dec 2018 08:00) (65 - 87)  BP: 133/51 (19 Dec 2018 08:00) (101/44 - 187/67)  BP(mean): 69 (19 Dec 2018 08:00) (59 - 90)  RR: 16 (19 Dec 2018 08:00) (0 - 16)  SpO2: 100% (19 Dec 2018 08:00) (98% - 100%)  Daily     Daily     PPSV2:   %  FAST:    General:  Mental Status:  HEENT:  Lungs:  Cardiac:  GI:  :  Ext:  Neuro:      LABS:                        7.1    0.48  )-----------( 47       ( 19 Dec 2018 05:20 )             23.3     12-19    148<H>  |  118<H>  |  53<H>  ----------------------------<  120<H>  4.6   |  23  |  0.65    Ca    8.6      19 Dec 2018 05:20  Phos  3.0     12-19  Mg     2.2     12-19        Albumin: Albumin, Serum: 2.2 g/dL (12-14 @ 10:17)      Allergies    No Known Allergies    Intolerances      MEDICATIONS  (STANDING):  dexamethasone  Injectable 4 milliGRAM(s) IV Push every 6 hours  dextrose 5%. 1000 milliLiter(s) (50 mL/Hr) IV Continuous <Continuous>  dextrose 50% Injectable 12.5 Gram(s) IV Push once  dextrose 50% Injectable 25 Gram(s) IV Push once  dextrose 50% Injectable 25 Gram(s) IV Push once  fentaNYL   Infusion 0.5 MICROgram(s)/kG/Hr (4.13 mL/Hr) IV Continuous <Continuous>  filgrastim-sndz Injectable 480 MICROGram(s) SubCutaneous daily  insulin lispro (HumaLOG) corrective regimen sliding scale   SubCutaneous every 6 hours  pantoprazole  Injectable 40 milliGRAM(s) IV Push daily  sodium chloride 0.45%. 1000 milliLiter(s) (75 mL/Hr) IV Continuous <Continuous>    MEDICATIONS  (PRN):  acetaminophen  Suppository .. 650 milliGRAM(s) Rectal every 6 hours PRN Temp greater or equal to 38.5C (101.3F)  dextrose 40% Gel 15 Gram(s) Oral once PRN Blood Glucose LESS THAN 70 milliGRAM(s)/deciliter  glucagon  Injectable 1 milliGRAM(s) IntraMuscular once PRN Glucose LESS THAN 70 milligrams/deciliter  HYDROmorphone  Injectable 1 milliGRAM(s) IV Push every 30 minutes PRN respiratory distress  LORazepam   Injectable 2 milliGRAM(s) IV Push every 1 hour PRN Anxiety      RADIOLOGY/ADDITIONAL STUDIES: HPI: Patient is a 78y M PMHx of DMII, HTN, lung Ca stage IV with mets to neck s/p tumor removal of the neck on 10/30.  He was discharged to rehab for a few week and optimized and discharged home.  He was being followed by oncology with Dr Lam, started on chemo/RT.  He presents to the [place] ED on 12/13/18 from home with Shortness of breath, acute decline in his overall functional status, has become progressively weaker, associated with appetite suppression, increased lethargy, and generalized pain.  Shortly after arrival to the ED the patient had an episode of cardiac arrest and became apneic. Patient was noticed to be in PEA, CPR was started patient was intubated by Pt was admitted to ICU on vent.   Palliative medicine Consult to establish GOC.  12/18/18 Seen and examined at bedside with wife in room. Pt remains on vent support with cont Fentanyl infusion maintained. Eyes open intermittently.   PAIN: ( )Yes   ( )No  Unable to quantify/Cont Fentanyl infusion    DYSPNEA: ( ) Yes  (X ) No  Vent support    PAST MEDICAL & SURGICAL HISTORY:  Lung cancer: Stage IV with distant mets  Essential hypertension  Type 2 diabetes mellitus without complication, without long-term current use of insulin  H/O neck surgery: Tumor Resection      SOCIAL HX:  Lives with wife  Hx opiate tolerance ( )YES  ( )NO    Baseline ADLs  (Prior to Admission)  (X ) Independent   ( )Dependent    FAMILY HISTORY:  Family history of Parkinson disease (Father): Father      Review of Systems:      Unable to obtain/Limited due to: unresponsive/vented      PHYSICAL EXAM:    Vital Signs Last 24 Hrs  T(C): 37.3 (19 Dec 2018 08:00), Max: 37.9 (19 Dec 2018 04:00)  T(F): 99.2 (19 Dec 2018 08:00), Max: 100.2 (19 Dec 2018 04:00)  HR: 70 (19 Dec 2018 08:00) (65 - 87)  BP: 133/51 (19 Dec 2018 08:00) (101/44 - 187/67)  BP(mean): 69 (19 Dec 2018 08:00) (59 - 90)  RR: 16 (19 Dec 2018 08:00) (0 - 16)  SpO2: 100% (19 Dec 2018 08:00) (98% - 100%)      PPSV2: 10 %    General: Elderly male in bed in ICU on vent support  Mental Status: Eyes open intermittently  HEENT: ET tube to vent  Lungs: clear to auscultation  Cardiac: S1S2+  GI: abd soft NT ND + BS  : abd soft NT + BS  Ext: BUE edema /BLE no edema  Neuro: no spontaneous movement X 4 ext      LABS:                        7.1    0.48  )-----------( 47       ( 19 Dec 2018 05:20 )             23.3     12-19    148<H>  |  118<H>  |  53<H>  ----------------------------<  120<H>  4.6   |  23  |  0.65    Ca    8.6      19 Dec 2018 05:20  Phos  3.0     12-19  Mg     2.2     12-19        Albumin: Albumin, Serum: 2.2 g/dL (12-14 @ 10:17)      Allergies    No Known Allergies    Intolerances      MEDICATIONS  (STANDING):  dexamethasone  Injectable 4 milliGRAM(s) IV Push every 6 hours  dextrose 5%. 1000 milliLiter(s) (50 mL/Hr) IV Continuous <Continuous>  dextrose 50% Injectable 12.5 Gram(s) IV Push once  dextrose 50% Injectable 25 Gram(s) IV Push once  dextrose 50% Injectable 25 Gram(s) IV Push once  fentaNYL   Infusion 0.5 MICROgram(s)/kG/Hr (4.13 mL/Hr) IV Continuous <Continuous>  filgrastim-sndz Injectable 480 MICROGram(s) SubCutaneous daily  insulin lispro (HumaLOG) corrective regimen sliding scale   SubCutaneous every 6 hours  pantoprazole  Injectable 40 milliGRAM(s) IV Push daily  sodium chloride 0.45%. 1000 milliLiter(s) (75 mL/Hr) IV Continuous <Continuous>    MEDICATIONS  (PRN):  acetaminophen  Suppository .. 650 milliGRAM(s) Rectal every 6 hours PRN Temp greater or equal to 38.5C (101.3F)  dextrose 40% Gel 15 Gram(s) Oral once PRN Blood Glucose LESS THAN 70 milliGRAM(s)/deciliter  glucagon  Injectable 1 milliGRAM(s) IntraMuscular once PRN Glucose LESS THAN 70 milligrams/deciliter  HYDROmorphone  Injectable 1 milliGRAM(s) IV Push every 30 minutes PRN respiratory distress  LORazepam   Injectable 2 milliGRAM(s) IV Push every 1 hour PRN Anxiety      RADIOLOGY/ADDITIONAL STUDIES:

## 2018-12-19 NOTE — PROGRESS NOTE ADULT - SUBJECTIVE AND OBJECTIVE BOX
CC:  Resp failure    HPI:    77 y/o male with DMII, HTN, lung Ca stage IV with mets to neck s/p tumor removal of the neck on 10/30 at [facility], presents to the [facility] ED on 18 from home with Shortness of breath. S/P surgery, patient spent three weeks in [facility] and was discharged day before . Over this time after surgery the patent has been started on chemotherapy and has since seen an acute decline in his overall functional status, has become progressively weaker, associated with appetite suppression, increased lethargy, and generalized pain, controlled with Morphine and oxycodone. Shortly after arrival to the ED the patient had an episode of cardiac arrest and became apneic.     Patient was noticed to be in PEA, CPR was started and intubated by Anesthesia (difficult airway) and placed on Propofol. ICU was consulted    :  ICU D # 1.  Pt seen and examined in ICU--vented--sedated--on Dopa gtt 10--Improved hemodynamics--family made pt DNR.  CTA chest--No PE/enlarged R lung mass/multiple bony mets and spine mets    12/15:  ICU D # 2.  Pt seen and examined in ICU--vented--awake and alert--Unable to move extreme, able to shrug shoulders.  Vent changed to SBT. Remains  Neuro findings d/w dr robin (covering Dr cervantes)--she will obtain spine/NSGY eval        PMH:  As above.     PSH:  As above.     FH: Non Contributory other than those listed in HPI    Social History:      MEDICATIONS  (STANDING):  chlorhexidine 0.12% Liquid 15 milliLiter(s) Oral Mucosa two times a day  dextrose 5%. 1000 milliLiter(s) (50 mL/Hr) IV Continuous <Continuous>  dextrose 50% Injectable 12.5 Gram(s) IV Push once  dextrose 50% Injectable 25 Gram(s) IV Push once  dextrose 50% Injectable 25 Gram(s) IV Push once  DOPamine Infusion 10 MICROgram(s)/kG/Min (30.975 mL/Hr) IV Continuous <Continuous>  enoxaparin Injectable 40 milliGRAM(s) SubCutaneous every 24 hours  influenza   Vaccine 0.5 milliLiter(s) IntraMuscular once  insulin lispro (HumaLOG) corrective regimen sliding scale   SubCutaneous every 6 hours  pantoprazole  Injectable 40 milliGRAM(s) IV Push daily  phenylephrine    Infusion 0.1 MICROgram(s)/kG/Min (3.097 mL/Hr) IV Continuous <Continuous>  piperacillin/tazobactam IVPB. 3.375 Gram(s) IV Intermittent every 8 hours  sodium chloride 0.9%. 1000 milliLiter(s) (75 mL/Hr) IV Continuous <Continuous>    MEDICATIONS  (PRN):  acetaminophen  Suppository .. 650 milliGRAM(s) Rectal every 6 hours PRN Temp greater or equal to 38.5C (101.3F)  dextrose 40% Gel 15 Gram(s) Oral once PRN Blood Glucose LESS THAN 70 milliGRAM(s)/deciliter  glucagon  Injectable 1 milliGRAM(s) IntraMuscular once PRN Glucose LESS THAN 70 milligrams/deciliter  HYDROmorphone  Injectable 1 milliGRAM(s) IV Push every 4 hours PRN Moderate Pain (4 - 6)  LORazepam   Injectable 2 milliGRAM(s) IV Push every 3 hours PRN Agitation      Allergies: NKDA    ROS:  SEE BELOW        ICU Vital Signs Last 24 Hrs  T(C): 36.7 (15 Dec 2018 09:00), Max: 38.7 (14 Dec 2018 16:00)  T(F): 98 (15 Dec 2018 09:00), Max: 101.6 (14 Dec 2018 16:00)  HR: 97 (15 Dec 2018 09:00) (93 - 132)  BP: 136/111 (15 Dec 2018 09:00) (83/50 - 141/57)  BP(mean): 116 (15 Dec 2018 09:00) (57 - 116)  ABP: --  ABP(mean): --  RR: 22 (15 Dec 2018 09:00) (13 - 22)  SpO2: 100% (15 Dec 2018 09:00) (97% - 100%)      Mode: CPAP with PS  FiO2: 50  PEEP: 5      I&O's Summary    14 Dec 2018 07:01  -  15 Dec 2018 07:00  --------------------------------------------------------  IN: 2582.2 mL / OUT: 950 mL / NET: 1632.2 mL        Physical Exam:  SEE BELOW                          8.9    1.07  )-----------( 199      ( 15 Dec 2018 07:13 )             28.7       12-15    139  |  109<H>  |  36<H>  ----------------------------<  148<H>  3.6   |  23  |  0.82    Ca    8.8      15 Dec 2018 07:13  Phos  3.2     12-15  Mg     2.0     12-15    TPro  6.3  /  Alb  2.2<L>  /  TBili  0.6  /  DBili  0.3<H>  /  AST  18  /  ALT  19  /  AlkPhos  96  12-14      CARDIAC MARKERS ( 13 Dec 2018 12:00 )  0.042 ng/mL / x     / x     / x     / x            ABG - ( 13 Dec 2018 19:34 )  pH, Arterial: 7.33  pH, Blood: x     /  pCO2: 44    /  pO2: 165   / HCO3: 22    / Base Excess: -3.1  /  SaO2: 98                  Urinalysis Basic - ( 13 Dec 2018 13:00 )    Color: Yellow / Appearance: Clear / S.015 / pH: x  Gluc: x / Ketone: Negative  / Bili: Negative / Urobili: Negative mg/dL   Blood: x / Protein: 30 mg/dL / Nitrite: Negative   Leuk Esterase: Negative / RBC: 3-5 /HPF / WBC 0-2   Sq Epi: x / Non Sq Epi: Few / Bacteria: Occasional        DVT Prophylaxis:                                                            Contraindication:     Advanced Directives:    Discussed with:    Visit Information:  Time spent excluding procedure:  45 cc mins    ** Time is exclusive of billed procedures and/or teaching and/or routine family updates.
INTERVAL HISTORY:  patient intubated.   REVIEW OF SYSTEMS:      Allergies    No Known Allergies    Intolerances        MEDICATIONS  (STANDING):  chlorhexidine 0.12% Liquid 15 milliLiter(s) Oral Mucosa two times a day  dextrose 5%. 1000 milliLiter(s) (50 mL/Hr) IV Continuous <Continuous>  dextrose 50% Injectable 12.5 Gram(s) IV Push once  dextrose 50% Injectable 25 Gram(s) IV Push once  dextrose 50% Injectable 25 Gram(s) IV Push once  DOPamine Infusion 10 MICROgram(s)/kG/Min (30.975 mL/Hr) IV Continuous <Continuous>  enoxaparin Injectable 40 milliGRAM(s) SubCutaneous every 24 hours  influenza   Vaccine 0.5 milliLiter(s) IntraMuscular once  insulin lispro (HumaLOG) corrective regimen sliding scale   SubCutaneous every 6 hours  pantoprazole  Injectable 40 milliGRAM(s) IV Push daily  phenylephrine    Infusion 0.1 MICROgram(s)/kG/Min (3.097 mL/Hr) IV Continuous <Continuous>  piperacillin/tazobactam IVPB. 3.375 Gram(s) IV Intermittent every 8 hours  sodium chloride 0.9%. 1000 milliLiter(s) (75 mL/Hr) IV Continuous <Continuous>    MEDICATIONS  (PRN):  acetaminophen  Suppository .. 650 milliGRAM(s) Rectal every 6 hours PRN Temp greater or equal to 38.5C (101.3F)  dextrose 40% Gel 15 Gram(s) Oral once PRN Blood Glucose LESS THAN 70 milliGRAM(s)/deciliter  glucagon  Injectable 1 milliGRAM(s) IntraMuscular once PRN Glucose LESS THAN 70 milligrams/deciliter  HYDROmorphone  Injectable 1 milliGRAM(s) IV Push every 4 hours PRN Moderate Pain (4 - 6)  LORazepam   Injectable 2 milliGRAM(s) IV Push every 3 hours PRN Agitation      Vital Signs Last 24 Hrs  T(C): 37.2 (14 Dec 2018 20:00), Max: 40.2 (14 Dec 2018 00:00)  T(F): 98.9 (14 Dec 2018 20:00), Max: 104.3 (14 Dec 2018 00:00)  HR: 126 (14 Dec 2018 20:00) (93 - 135)  BP: 132/54 (14 Dec 2018 20:00) (92/45 - 140/88)  BP(mean): 72 (14 Dec 2018 20:00) (57 - 98)  RR: 16 (14 Dec 2018 20:00) (8 - 24)  SpO2: 100% (14 Dec 2018 20:00) (92% - 100%)    PHYSICAL EXAM:    GENERAL: intubated   HEAD:  Atraumatic, Normocephalic  EYES: EOMI, PERRLA, conjunctiva and sclera clear  NECK: patient with collar in place  CHEST/LUNG: on mechanical vent   HEART: Regular rate and rhythm;   EXTREMITIES:   edema:-          LABS:                        10.2   4.32  )-----------( 286      ( 14 Dec 2018 10:17 )             32.3     12-14    139  |  109<H>  |  38<H>  ----------------------------<  166<H>  4.5   |  24  |  0.89    Ca    8.8      14 Dec 2018 10:17  Mg     2.0     12-13    TPro  6.3  /  Alb  2.2<L>  /  TBili  0.6  /  DBili  0.3<H>  /  AST  18  /  ALT  19  /  AlkPhos  96  12-14    PT/INR - ( 13 Dec 2018 12:00 )   PT: 12.2 sec;   INR: 1.10 ratio         PTT - ( 13 Dec 2018 12:00 )  PTT:25.7 sec  Urinalysis Basic - ( 13 Dec 2018 13:00 )    Color: Yellow / Appearance: Clear / S.015 / pH: x  Gluc: x / Ketone: Negative  / Bili: Negative / Urobili: Negative mg/dL   Blood: x / Protein: 30 mg/dL / Nitrite: Negative   Leuk Esterase: Negative / RBC: 3-5 /HPF / WBC 0-2   Sq Epi: x / Non Sq Epi: Few / Bacteria: Occasional
79 y/o male with DMII, HTN, lung Ca stage IV with mets to neck s/p tumor removal of the neck on 10/30 at [facility], presents to the [facility] ED on 12/13/18 from home with Shortness of breath. S/P surgery, patient spent three weeks in [facility] and was discharged day before thanksgiving. Over this time after surgery the patent has been started on chemotherapy and has since seen an acute decline in his overall functional status, has become progressively weaker, associated with appetite suppression, increased lethargy, and generalized pain, controlled with Morphine and oxycodone. Shortly after arrival to the ED the patient had an episode of cardiac arrest and became apneic.     Patient was noticed to be in PEA, CPR was started and intubated by Anesthesia (difficult airway) and placed on Propofol. ICU was consulted    12/14:  ICU D # 1.  Pt seen and examined in ICU--vented--sedated--on Dopa gtt 10--Improved hemodynamics--family made pt DNR.  CTA chest--No PE/enlarged R lung mass/multiple bony mets and spine mets    12/15:  ICU D # 2.  Pt seen and examined in ICU--vented--awake and alert--Unable to move extreme, able to shrug shoulders.  Vent changed to SBT. Remains  Neuro findings d/w dr robin (covering Dr cervantes)--she will obtain spine/NSGY eval    12/16:  ICU D # 3.  Above noted--under went emergent C-spine decompression Sx--still not able to move this morning.  Off Dopa gtt.  FS slightly higher on decadron.  On 80% FIO2 post op     12/17: Patient seen in bed and remains without movement of his extremities.  He is awake and opens his eyes.           PAST MEDICAL & SURGICAL HISTORY:  Lung cancer: Stage IV with distant mets  Essential hypertension  Type 2 diabetes mellitus without complication, without long-term current use of insulin  H/O neck surgery: Tumor Resection      FAMILY HISTORY:  Family history of Parkinson disease (Father): Father      Social Hx:    Allergies    No Known Allergies    Intolerances            ICU Vital Signs Last 24 Hrs  T(C): 37.1 (17 Dec 2018 09:00), Max: 37.7 (16 Dec 2018 16:00)  T(F): 98.7 (17 Dec 2018 09:00), Max: 99.8 (16 Dec 2018 16:00)  HR: 80 (17 Dec 2018 13:00) (72 - 104)  BP: 110/52 (17 Dec 2018 13:00) (86/46 - 130/64)  BP(mean): 66 (17 Dec 2018 13:00) (55 - 73)  ABP: --  ABP(mean): --  RR: 9 (17 Dec 2018 13:00) (0 - 18)  SpO2: 100% (17 Dec 2018 13:00) (97% - 100%)      Mode: AC/ CMV (Assist Control/ Continuous Mandatory Ventilation)  RR (machine): 16  TV (machine): 450  FiO2: 100  PEEP: 5  ITime: 1  PIP: 20      I&O's Summary    16 Dec 2018 07:01  -  17 Dec 2018 07:00  --------------------------------------------------------  IN: 2193.5 mL / OUT: 760 mL / NET: 1433.5 mL    17 Dec 2018 07:01  -  17 Dec 2018 14:08  --------------------------------------------------------  IN: 82.4 mL / OUT: 250 mL / NET: -167.6 mL                              7.1    0.43  )-----------( 100      ( 17 Dec 2018 06:26 )             23.0       12-17    143  |  113<H>  |  42<H>  ----------------------------<  156<H>  4.3   |  23  |  0.83    Ca    8.1<L>      17 Dec 2018 06:26  Phos  3.2     12-17  Mg     2.1     12-17                      MEDICATIONS  (STANDING):  chlorhexidine 0.12% Liquid 15 milliLiter(s) Oral Mucosa two times a day  dexamethasone  Injectable 4 milliGRAM(s) IV Push every 6 hours  dextrose 5%. 1000 milliLiter(s) (50 mL/Hr) IV Continuous <Continuous>  dextrose 50% Injectable 12.5 Gram(s) IV Push once  dextrose 50% Injectable 25 Gram(s) IV Push once  dextrose 50% Injectable 25 Gram(s) IV Push once  fentaNYL    Injectable 50 MICROGram(s) IV Push once  fentaNYL   Infusion 0.5 MICROgram(s)/kG/Hr (4.13 mL/Hr) IV Continuous <Continuous>  filgrastim-sndz Injectable 480 MICROGram(s) SubCutaneous daily  influenza   Vaccine 0.5 milliLiter(s) IntraMuscular once  insulin glargine Injectable (LANTUS) 5 Unit(s) SubCutaneous every morning  insulin lispro (HumaLOG) corrective regimen sliding scale   SubCutaneous every 6 hours  pantoprazole  Injectable 40 milliGRAM(s) IV Push daily  piperacillin/tazobactam IVPB. 3.375 Gram(s) IV Intermittent every 8 hours  sodium chloride 0.9%. 1000 milliLiter(s) (75 mL/Hr) IV Continuous <Continuous>    MEDICATIONS  (PRN):  acetaminophen  Suppository .. 650 milliGRAM(s) Rectal every 6 hours PRN Temp greater or equal to 38.5C (101.3F)  dextrose 40% Gel 15 Gram(s) Oral once PRN Blood Glucose LESS THAN 70 milliGRAM(s)/deciliter  glucagon  Injectable 1 milliGRAM(s) IntraMuscular once PRN Glucose LESS THAN 70 milligrams/deciliter  LORazepam   Injectable 2 milliGRAM(s) IV Push every 3 hours PRN Agitation      DVT Prophylaxis: V    Advanced Directives:  Discussed with:    Visit Information: 30 min    ** Time is exclusive of billed procedures and/or teaching and/or routine family updates.
79 y/o male with DMII, HTN, lung Ca stage IV with mets to neck s/p tumor removal of the neck on 10/30 at [facility], presents to the [facility] ED on 12/13/18 from home with Shortness of breath. S/P surgery, patient spent three weeks in [facility] and was discharged day before thanksgiving. Over this time after surgery the patent has been started on chemotherapy and has since seen an acute decline in his overall functional status, has become progressively weaker, associated with appetite suppression, increased lethargy, and generalized pain, controlled with Morphine and oxycodone. Shortly after arrival to the ED the patient had an episode of cardiac arrest and became apneic.     Patient was noticed to be in PEA, CPR was started and intubated by Anesthesia (difficult airway) and placed on Propofol. ICU was consulted    12/14:  ICU D # 1.  Pt seen and examined in ICU--vented--sedated--on Dopa gtt 10--Improved hemodynamics--family made pt DNR.  CTA chest--No PE/enlarged R lung mass/multiple bony mets and spine mets    12/15:  ICU D # 2.  Pt seen and examined in ICU--vented--awake and alert--Unable to move extreme, able to shrug shoulders.  Vent changed to SBT. Remains  Neuro findings d/w dr robin (covering Dr cervantes)--she will obtain spine/NSGY eval    12/16:  ICU D # 3.  Above noted--under went emergent C-spine decompression Sx--still not able to move this morning.  Off Dopa gtt.  FS slightly higher on decadron.  On 80% FIO2 post op     12/17: Patient seen in bed and remains without movement of his extremities.  He is awake and opens his eyes.      12/18: Family has decided to hold off on the terminal extubation for now.  Decreased O2 sat this morning    12/19: Family to likely withdraw care today         PAST MEDICAL & SURGICAL HISTORY:  Lung cancer: Stage IV with distant mets  Essential hypertension  Type 2 diabetes mellitus without complication, without long-term current use of insulin  H/O neck surgery: Tumor Resection      FAMILY HISTORY:  Family history of Parkinson disease (Father): Father      Social Hx:    Allergies    No Known Allergies    Intolerances            ICU Vital Signs Last 24 Hrs  T(C): 37.3 (19 Dec 2018 08:00), Max: 37.9 (19 Dec 2018 04:00)  T(F): 99.2 (19 Dec 2018 08:00), Max: 100.2 (19 Dec 2018 04:00)  HR: 115 (19 Dec 2018 10:00) (65 - 115)  BP: 105/68 (19 Dec 2018 10:00) (101/44 - 187/67)  BP(mean): 77 (19 Dec 2018 10:00) (59 - 90)  ABP: --  ABP(mean): --  RR: 17 (19 Dec 2018 10:00) (0 - 17)  SpO2: 100% (19 Dec 2018 10:00) (98% - 100%)      Mode: AC/ CMV (Assist Control/ Continuous Mandatory Ventilation)  RR (machine): 16  TV (machine): 500  FiO2: 100  PEEP: 5  ITime: 1  MAP: 9.6  PIP: 22      I&O's Summary    18 Dec 2018 07:01  -  19 Dec 2018 07:00  --------------------------------------------------------  IN: 1740.5 mL / OUT: 1400 mL / NET: 340.5 mL    19 Dec 2018 07:01  -  19 Dec 2018 11:01  --------------------------------------------------------  IN: 315.8 mL / OUT: 0 mL / NET: 315.8 mL                              7.1    0.48  )-----------( 47       ( 19 Dec 2018 05:20 )             23.3       12-19    148<H>  |  118<H>  |  53<H>  ----------------------------<  120<H>  4.6   |  23  |  0.65    Ca    8.6      19 Dec 2018 05:20  Phos  3.0     12-19  Mg     2.2     12-19                      MEDICATIONS  (STANDING):  chlorhexidine 0.12% Liquid 15 milliLiter(s) Oral Mucosa two times a day  dexamethasone  Injectable 4 milliGRAM(s) IV Push every 6 hours  dextrose 5%. 1000 milliLiter(s) (50 mL/Hr) IV Continuous <Continuous>  dextrose 50% Injectable 12.5 Gram(s) IV Push once  dextrose 50% Injectable 25 Gram(s) IV Push once  dextrose 50% Injectable 25 Gram(s) IV Push once  fentaNYL   Infusion 0.5 MICROgram(s)/kG/Hr (4.13 mL/Hr) IV Continuous <Continuous>  filgrastim-sndz Injectable 480 MICROGram(s) SubCutaneous daily  insulin lispro (HumaLOG) corrective regimen sliding scale   SubCutaneous every 6 hours  pantoprazole  Injectable 40 milliGRAM(s) IV Push daily  sodium chloride 0.45%. 1000 milliLiter(s) (75 mL/Hr) IV Continuous <Continuous>    MEDICATIONS  (PRN):  acetaminophen  Suppository .. 650 milliGRAM(s) Rectal every 6 hours PRN Temp greater or equal to 38.5C (101.3F)  dextrose 40% Gel 15 Gram(s) Oral once PRN Blood Glucose LESS THAN 70 milliGRAM(s)/deciliter  glucagon  Injectable 1 milliGRAM(s) IntraMuscular once PRN Glucose LESS THAN 70 milligrams/deciliter  HYDROmorphone  Injectable 1 milliGRAM(s) IV Push every 30 minutes PRN respiratory distress  LORazepam   Injectable 2 milliGRAM(s) IV Push every 1 hour PRN Anxiety      DVT Prophylaxis:    Advanced Directives:  Discussed with:    Visit Information: 30 min    ** Time is exclusive of billed procedures and/or teaching and/or routine family updates.
CC:  Resp failure    HPI:    77 y/o male with DMII, HTN, lung Ca stage IV with mets to neck s/p tumor removal of the neck on 10/30 at [facility], presents to the [facility] ED on 12/13/18 from home with Shortness of breath. S/P surgery, patient spent three weeks in [facility] and was discharged day before thanksgiving. Over this time after surgery the patent has been started on chemotherapy and has since seen an acute decline in his overall functional status, has become progressively weaker, associated with appetite suppression, increased lethargy, and generalized pain, controlled with Morphine and oxycodone. Shortly after arrival to the ED the patient had an episode of cardiac arrest and became apneic.     Patient was noticed to be in PEA, CPR was started and intubated by Anesthesia (difficult airway) and placed on Propofol. ICU was consulted    12/14:  ICU D # 1.  Pt seen and examined in ICU--vented--sedated--on Dopa gtt 10--Improved hemodynamics--family made pt DNR.  CTA chest--No PE/enlarged R lung mass/multiple bony mets and spine mets    12/15:  ICU D # 2.  Pt seen and examined in ICU--vented--awake and alert--Unable to move extreme, able to shrug shoulders.  Vent changed to SBT. Remains  Neuro findings d/w dr robin (covering Dr cervantes)--she will obtain spine/NSGY eval    12/16:  ICU D # 3.  Above noted--under went emergent C-spine decompression Sx--still not able to move this morning.  Off Dopa gtt.  FS slightly higher on decadron.  On 80% FIO2 post op       PMH:  As above.     PSH:  As above.     FH: Non Contributory other than those listed in HPI    Social History:      MEDICATIONS  (STANDING):  chlorhexidine 0.12% Liquid 15 milliLiter(s) Oral Mucosa two times a day  dexamethasone  Injectable 4 milliGRAM(s) IV Push every 6 hours  dextrose 5%. 1000 milliLiter(s) (50 mL/Hr) IV Continuous <Continuous>  dextrose 50% Injectable 12.5 Gram(s) IV Push once  dextrose 50% Injectable 25 Gram(s) IV Push once  dextrose 50% Injectable 25 Gram(s) IV Push once  influenza   Vaccine 0.5 milliLiter(s) IntraMuscular once  insulin glargine Injectable (LANTUS) 5 Unit(s) SubCutaneous every morning  insulin lispro (HumaLOG) corrective regimen sliding scale   SubCutaneous every 6 hours  pantoprazole  Injectable 40 milliGRAM(s) IV Push daily  piperacillin/tazobactam IVPB. 3.375 Gram(s) IV Intermittent every 8 hours  sodium chloride 0.9%. 1000 milliLiter(s) (75 mL/Hr) IV Continuous <Continuous>    MEDICATIONS  (PRN):  acetaminophen  Suppository .. 650 milliGRAM(s) Rectal every 6 hours PRN Temp greater or equal to 38.5C (101.3F)  dextrose 40% Gel 15 Gram(s) Oral once PRN Blood Glucose LESS THAN 70 milliGRAM(s)/deciliter  glucagon  Injectable 1 milliGRAM(s) IntraMuscular once PRN Glucose LESS THAN 70 milligrams/deciliter  HYDROmorphone  Injectable 1 milliGRAM(s) IV Push every 4 hours PRN Moderate Pain (4 - 6)  LORazepam   Injectable 2 milliGRAM(s) IV Push every 3 hours PRN Agitation      Allergies: NKDA    ROS:  SEE BELOW        ICU Vital Signs Last 24 Hrs  T(C): 37.4 (16 Dec 2018 05:00), Max: 37.4 (16 Dec 2018 02:00)  T(F): 99.3 (16 Dec 2018 05:00), Max: 99.4 (16 Dec 2018 02:00)  HR: 87 (16 Dec 2018 09:00) (72 - 103)  BP: 119/58 (16 Dec 2018 09:00) (97/35 - 175/81)  BP(mean): 72 (16 Dec 2018 09:00) (50 - 106)  ABP: --  ABP(mean): --  RR: 11 (16 Dec 2018 09:00) (3 - 22)  SpO2: 76% (16 Dec 2018 09:00) (76% - 100%)      Mode: AC/ CMV (Assist Control/ Continuous Mandatory Ventilation)  RR (machine): 16  TV (machine): 500  FiO2: 80  PEEP: 5  MAP:   PIP: 22      I&O's Summary    15 Dec 2018 07:01  -  16 Dec 2018 07:00  --------------------------------------------------------  IN: 3897.1 mL / OUT: 1040 mL / NET: 2857.1 mL        Physical Exam:  SEE BELOW                          8.1    0.48  )-----------( 143      ( 16 Dec 2018 05:13 )             25.9       12-16    141  |  113<H>  |  33<H>  ----------------------------<  178<H>  4.2   |  20<L>  |  0.64    Ca    8.4<L>      16 Dec 2018 05:13  Phos  3.4     12-16  Mg     1.9     12-16    TPro  6.3  /  Alb  2.2<L>  /  TBili  0.6  /  DBili  0.3<H>  /  AST  18  /  ALT  19  /  AlkPhos  96  12-14                    DVT Prophylaxis:                                                            Contraindication:     Advanced Directives:    Discussed with:    Visit Information:  Time spent excluding procedure:  45 cc mins    ** Time is exclusive of billed procedures and/or teaching and/or routine family updates.
Chief Complaint: Unable to move all extremiites    History: Patient seen earlier today. Now more awake. Underwent MRI cervical and thoracic spine.    OBJECTIVE:     Vital Signs Last 24 Hrs  T(C): 36.5 (15 Dec 2018 15:00), Max: 38.7 (14 Dec 2018 16:00)  T(F): 97.7 (15 Dec 2018 15:00), Max: 101.6 (14 Dec 2018 16:00)  HR: 98 (15 Dec 2018 15:00) (77 - 132)  BP: 109/53 (15 Dec 2018 15:00) (83/50 - 141/57)  BP(mean): 67 (15 Dec 2018 15:00) (50 - 116)  RR: 16 (15 Dec 2018 15:00) (13 - 22)  SpO2: 94% (15 Dec 2018 15:00) (94% - 100%)    Physical Exam:         Awake          HEENT - unremarkable         Neck - (+) brace with thoracic extension         B ELENO - can shrug shoulders bilaterally but no other movement of UE         B LE - no movement BLE.             I&O's Detail    14 Dec 2018 07:01  -  15 Dec 2018 07:00  --------------------------------------------------------  IN:    DOPamine Infusion: 657.2 mL    IV PiggyBack: 200 mL    sodium chloride 0.9%.: 1725 mL  Total IN: 2582.2 mL    OUT:    Indwelling Catheter - Urethral: 950 mL  Total OUT: 950 mL    Total NET: 1632.2 mL      15 Dec 2018 07:01  -  15 Dec 2018 15:33  --------------------------------------------------------  IN:    DOPamine Infusion: 142.5 mL    IV PiggyBack: 100 mL    sodium chloride 0.9%.: 600 mL  Total IN: 842.5 mL    OUT:    Indwelling Catheter - Urethral: 275 mL  Total OUT: 275 mL    Total NET: 567.5 mL          LABS:                        8.9    1.07  )-----------( 199      ( 15 Dec 2018 07:13 )             28.7     12-15    139  |  109<H>  |  36<H>  ----------------------------<  148<H>  3.6   |  23  |  0.82    Ca    8.8      15 Dec 2018 07:13  Phos  3.2     12-15  Mg     2.0     12-15    TPro  6.3  /  Alb  2.2<L>  /  TBili  0.6  /  DBili  0.3<H>  /  AST  18  /  ALT  19  /  AlkPhos  96  12-14          RADIOLOGY & ADDITIONAL STUDIES: MRI Cervical and thoracic spine - s/p fusion Occ-C4. There is a large fluid collection that extends from occiput to C4. The fluid mass causes severe cord compression at C3 which is the level of the patient's laminectomy. CT Scan of cervical spine- demonstrates occ-C4 fusion with instrumentation. There may be some loosening of the right screws at C3 and C4 but left sided instrumentation appears intact.      A/P :  78y Male with metastatic lung CA / s/p PSF Occ-C4 / Post operative fluid collection with cord compression/ paraplegia  -    MRI/CT findings discussed at length with the patient's wife and son. The need to surgical decompression was discussed as well. Surgery would include exploration of fusion/instrumentation and I&D posterior cervical wound occ-C4. Surgery discussed at length. Risks, benefits and alternatives to surgery discussed. Risks include but are not limited to risk of anesthesia, infection, dural tear, nerve/cord injury, excessive bleeding and incomplete relief of symptoms. It was stressed with patient's family that even with surgery his neurologic condition may not improve. Family understands the procedure and risks and wishes to proceed.
INTERVAL HISTORY:    REVIEW OF SYSTEMS:      Allergies    No Known Allergies    Intolerances        MEDICATIONS  (STANDING):  dexamethasone  Injectable 4 milliGRAM(s) IV Push every 6 hours  dextrose 5%. 1000 milliLiter(s) (50 mL/Hr) IV Continuous <Continuous>  dextrose 50% Injectable 12.5 Gram(s) IV Push once  dextrose 50% Injectable 25 Gram(s) IV Push once  dextrose 50% Injectable 25 Gram(s) IV Push once  enoxaparin Injectable 40 milliGRAM(s) SubCutaneous daily  fentaNYL   Infusion 0.5 MICROgram(s)/kG/Hr (4.13 mL/Hr) IV Continuous <Continuous>  filgrastim-sndz Injectable 480 MICROGram(s) SubCutaneous daily  insulin lispro (HumaLOG) corrective regimen sliding scale   SubCutaneous every 6 hours  pantoprazole  Injectable 40 milliGRAM(s) IV Push daily    MEDICATIONS  (PRN):  acetaminophen  Suppository .. 650 milliGRAM(s) Rectal every 6 hours PRN Temp greater or equal to 38.5C (101.3F)  dextrose 40% Gel 15 Gram(s) Oral once PRN Blood Glucose LESS THAN 70 milliGRAM(s)/deciliter  glucagon  Injectable 1 milliGRAM(s) IntraMuscular once PRN Glucose LESS THAN 70 milligrams/deciliter  HYDROmorphone  Injectable 1 milliGRAM(s) IV Push every 30 minutes PRN respiratory distress  LORazepam   Injectable 2 milliGRAM(s) IV Push every 1 hour PRN Anxiety      Vital Signs Last 24 Hrs  T(C): 37.1 (18 Dec 2018 09:00), Max: 37.2 (17 Dec 2018 16:00)  T(F): 98.8 (18 Dec 2018 09:00), Max: 98.9 (17 Dec 2018 16:00)  HR: 72 (18 Dec 2018 11:00) (65 - 80)  BP: 107/54 (18 Dec 2018 10:00) (89/46 - 129/62)  BP(mean): 66 (18 Dec 2018 10:00) (55 - 76)  RR: 0 (18 Dec 2018 11:00) (0 - 19)  SpO2: 100% (18 Dec 2018 11:00) (99% - 100%)    PHYSICAL EXAM:    GENERAL: INTUBATED,resting comfortably  NECK: Supple, No JVD, Normal thyroid  NERVOUS SYSTEM:  does not move upper and lower extremiies  HEART: Regular rate and rhythm;   ABDOMEN: Soft, Nontender.  EXTREMITIES:   edema:- + 3   LYMPH: No lymphadenopathy noted        LABS:                        6.8    0.50  )-----------( 71       ( 18 Dec 2018 08:25 )             21.9     12-18    146<H>  |  117<H>  |  54<H>  ----------------------------<  96  4.1   |  23  |  0.94    Ca    8.2<L>      18 Dec 2018 08:25  Phos  2.3     12-18  Mg     2.1     12-17              RADIOLOGY & ADDITIONAL STUDIES:    PATHOLOGY:
INTERVAL HISTORY:  POD 1   currently responds to verbal commands yet unable to move lower extremities     REVIEW OF SYSTEMS:      Allergies    No Known Allergies    Intolerances        MEDICATIONS  (STANDING):  chlorhexidine 0.12% Liquid 15 milliLiter(s) Oral Mucosa two times a day  dexamethasone  Injectable 4 milliGRAM(s) IV Push every 6 hours  dextrose 5%. 1000 milliLiter(s) (50 mL/Hr) IV Continuous <Continuous>  dextrose 50% Injectable 12.5 Gram(s) IV Push once  dextrose 50% Injectable 25 Gram(s) IV Push once  dextrose 50% Injectable 25 Gram(s) IV Push once  DOPamine Infusion 10 MICROgram(s)/kG/Min (30.975 mL/Hr) IV Continuous <Continuous>  filgrastim-sndz Injectable 480 MICROGram(s) SubCutaneous once  influenza   Vaccine 0.5 milliLiter(s) IntraMuscular once  insulin lispro (HumaLOG) corrective regimen sliding scale   SubCutaneous every 6 hours  pantoprazole  Injectable 40 milliGRAM(s) IV Push daily  phenylephrine    Infusion 0.1 MICROgram(s)/kG/Min (3.097 mL/Hr) IV Continuous <Continuous>  piperacillin/tazobactam IVPB. 3.375 Gram(s) IV Intermittent every 8 hours  sodium chloride 0.9%. 1000 milliLiter(s) (75 mL/Hr) IV Continuous <Continuous>    MEDICATIONS  (PRN):  acetaminophen  Suppository .. 650 milliGRAM(s) Rectal every 6 hours PRN Temp greater or equal to 38.5C (101.3F)  dextrose 40% Gel 15 Gram(s) Oral once PRN Blood Glucose LESS THAN 70 milliGRAM(s)/deciliter  glucagon  Injectable 1 milliGRAM(s) IntraMuscular once PRN Glucose LESS THAN 70 milligrams/deciliter  HYDROmorphone  Injectable 1 milliGRAM(s) IV Push every 4 hours PRN Moderate Pain (4 - 6)  LORazepam   Injectable 2 milliGRAM(s) IV Push every 3 hours PRN Agitation      Vital Signs Last 24 Hrs  T(C): 36.5 (15 Dec 2018 15:00), Max: 37.7 (14 Dec 2018 18:00)  T(F): 97.7 (15 Dec 2018 15:00), Max: 99.9 (15 Dec 2018 05:00)  HR: 102 (15 Dec 2018 15:30) (72 - 132)  BP: 112/49 (15 Dec 2018 15:30) (83/50 - 141/57)  BP(mean): 66 (15 Dec 2018 15:30) (50 - 116)  RR: 17 (15 Dec 2018 15:30) (13 - 22)  SpO2: 95% (15 Dec 2018 15:30) (94% - 100%)    PHYSICAL EXAM:    On clinical exam, patient intubated presently on pressors       LABS:                        8.9    1.07  )-----------( 199      ( 15 Dec 2018 07:13 )             28.7     12-15    139  |  109<H>  |  36<H>  ----------------------------<  148<H>  3.6   |  23  |  0.82    Ca    8.8      15 Dec 2018 07:13  Phos  3.2     12-15  Mg     2.0     12-15    TPro  6.3  /  Alb  2.2<L>  /  TBili  0.6  /  DBili  0.3<H>  /  AST  18  /  ALT  19  /  AlkPhos  96  12-14    Thyroid Stimulating Hormone, Serum (12.15.18 @ 07:13)    Thyroid Stimulating Hormone, Serum: 0.12 uU/mL    Cortisol AM, Serum (12.16.18 @ 05:13)    Cortisol AM, Serum: 12.0: Note reference range change for CORTAM and CORTPM. ug/dL      MRI T and C spine  There is high-grade canal stenosis cord deformity and hazy intermediate   signal abnormality from level of C3-4 to C5-6 with further narrowing of   the canal, effacement of the cerebrospinal fluid. There is compression   deformity of the cervical cord most notable at level of C3-4, and C4 with   hazy intramedullary signal at these levels.       RADIOLOGY & ADDITIONAL STUDIES:    PATHOLOGY:
INTERVAL HISTORY:  intubated, more responsive,  REVIEW OF SYSTEMS:      Allergies    No Known Allergies    Intolerances        MEDICATIONS  (STANDING):  fentaNYL   Infusion 0.5 MICROgram(s)/kG/Hr (4.13 mL/Hr) IV Continuous <Continuous>    MEDICATIONS  (PRN):  acetaminophen  Suppository .. 650 milliGRAM(s) Rectal every 6 hours PRN Temp greater or equal to 38.5C (101.3F)  HYDROmorphone  Injectable 1 milliGRAM(s) IV Push every 30 minutes PRN respiratory distress  LORazepam   Injectable 2 milliGRAM(s) IV Push every 1 hour PRN Anxiety      Vital Signs Last 24 Hrs  T(C): 37.2 (17 Dec 2018 16:00), Max: 37.7 (17 Dec 2018 01:00)  T(F): 98.9 (17 Dec 2018 16:00), Max: 99.8 (17 Dec 2018 01:00)  HR: 73 (17 Dec 2018 19:00) (65 - 102)  BP: 119/47 (17 Dec 2018 19:00) (92/45 - 130/64)  BP(mean): 64 (17 Dec 2018 19:00) (56 - 73)  RR: 16 (17 Dec 2018 19:00) (0 - 18)  SpO2: 100% (17 Dec 2018 19:00) (97% - 100%)    PHYSICAL EXAM:    GENERAL: intubated   HEAD:  Atraumatic, Normocephalic  EYES: EOMI, PERRLA, conjunctiva and sclera clear  CHEST/LUNG: transmitted vent sounds             LABS:                        7.1    0.43  )-----------( 100      ( 17 Dec 2018 06:26 )             23.0     12-17    143  |  113<H>  |  42<H>  ----------------------------<  156<H>  4.3   |  23  |  0.83    Ca    8.1<L>      17 Dec 2018 06:26  Phos  3.2     12-17  Mg     2.1     12-17              RADIOLOGY & ADDITIONAL STUDIES:    PATHOLOGY:
INTERVAL HISTORY:  patient off to OR for cyst drainage  new events, no movement of bilateral upper and lower extremity    REVIEW OF SYSTEMS:      Allergies    No Known Allergies    Intolerances        MEDICATIONS  (STANDING):  chlorhexidine 0.12% Liquid 15 milliLiter(s) Oral Mucosa two times a day  dexamethasone  Injectable 4 milliGRAM(s) IV Push every 6 hours  dextrose 5%. 1000 milliLiter(s) (50 mL/Hr) IV Continuous <Continuous>  dextrose 50% Injectable 12.5 Gram(s) IV Push once  dextrose 50% Injectable 25 Gram(s) IV Push once  dextrose 50% Injectable 25 Gram(s) IV Push once  DOPamine Infusion 10 MICROgram(s)/kG/Min (30.975 mL/Hr) IV Continuous <Continuous>  filgrastim-sndz Injectable 480 MICROGram(s) SubCutaneous once  influenza   Vaccine 0.5 milliLiter(s) IntraMuscular once  insulin lispro (HumaLOG) corrective regimen sliding scale   SubCutaneous every 6 hours  pantoprazole  Injectable 40 milliGRAM(s) IV Push daily  phenylephrine    Infusion 0.1 MICROgram(s)/kG/Min (3.097 mL/Hr) IV Continuous <Continuous>  piperacillin/tazobactam IVPB. 3.375 Gram(s) IV Intermittent every 8 hours  sodium chloride 0.9%. 1000 milliLiter(s) (75 mL/Hr) IV Continuous <Continuous>    MEDICATIONS  (PRN):  acetaminophen  Suppository .. 650 milliGRAM(s) Rectal every 6 hours PRN Temp greater or equal to 38.5C (101.3F)  dextrose 40% Gel 15 Gram(s) Oral once PRN Blood Glucose LESS THAN 70 milliGRAM(s)/deciliter  glucagon  Injectable 1 milliGRAM(s) IntraMuscular once PRN Glucose LESS THAN 70 milligrams/deciliter  HYDROmorphone  Injectable 1 milliGRAM(s) IV Push every 4 hours PRN Moderate Pain (4 - 6)  LORazepam   Injectable 2 milliGRAM(s) IV Push every 3 hours PRN Agitation      Vital Signs Last 24 Hrs  T(C): 36.5 (15 Dec 2018 15:00), Max: 37.7 (14 Dec 2018 18:00)  T(F): 97.7 (15 Dec 2018 15:00), Max: 99.9 (15 Dec 2018 05:00)  HR: 102 (15 Dec 2018 15:30) (72 - 132)  BP: 112/49 (15 Dec 2018 15:30) (83/50 - 141/57)  BP(mean): 66 (15 Dec 2018 15:30) (50 - 116)  RR: 17 (15 Dec 2018 15:30) (13 - 22)  SpO2: 95% (15 Dec 2018 15:30) (94% - 100%)    PHYSICAL EXAM:    On clinical exam, patient intubated presently on pressors       LABS:                        8.9    1.07  )-----------( 199      ( 15 Dec 2018 07:13 )             28.7     12-15    139  |  109<H>  |  36<H>  ----------------------------<  148<H>  3.6   |  23  |  0.82    Ca    8.8      15 Dec 2018 07:13  Phos  3.2     12-15  Mg     2.0     12-15    TPro  6.3  /  Alb  2.2<L>  /  TBili  0.6  /  DBili  0.3<H>  /  AST  18  /  ALT  19  /  AlkPhos  96  12-14      MRI T and C spine  There is high-grade canal stenosis cord deformity and hazy intermediate   signal abnormality from level of C3-4 to C5-6 with further narrowing of   the canal, effacement of the cerebrospinal fluid. There is compression   deformity of the cervical cord most notable at level of C3-4, and C4 with   hazy intramedullary signal at these levels.       RADIOLOGY & ADDITIONAL STUDIES:    PATHOLOGY:
INTERVAL HISTORY:  still on vent with fentanyl ggt     REVIEW OF SYSTEMS:      Allergies    No Known Allergies    Intolerances        MEDICATIONS  (STANDING):  chlorhexidine 0.12% Liquid 15 milliLiter(s) Oral Mucosa two times a day  dexamethasone  Injectable 4 milliGRAM(s) IV Push every 6 hours  dextrose 5%. 1000 milliLiter(s) (50 mL/Hr) IV Continuous <Continuous>  dextrose 50% Injectable 12.5 Gram(s) IV Push once  dextrose 50% Injectable 25 Gram(s) IV Push once  dextrose 50% Injectable 25 Gram(s) IV Push once  fentaNYL   Infusion 0.5 MICROgram(s)/kG/Hr (4.13 mL/Hr) IV Continuous <Continuous>  filgrastim-sndz Injectable 480 MICROGram(s) SubCutaneous daily  insulin lispro (HumaLOG) corrective regimen sliding scale   SubCutaneous every 6 hours  pantoprazole  Injectable 40 milliGRAM(s) IV Push daily  sodium chloride 0.45%. 1000 milliLiter(s) (75 mL/Hr) IV Continuous <Continuous>    MEDICATIONS  (PRN):  acetaminophen  Suppository .. 650 milliGRAM(s) Rectal every 6 hours PRN Temp greater or equal to 38.5C (101.3F)  dextrose 40% Gel 15 Gram(s) Oral once PRN Blood Glucose LESS THAN 70 milliGRAM(s)/deciliter  glucagon  Injectable 1 milliGRAM(s) IntraMuscular once PRN Glucose LESS THAN 70 milligrams/deciliter  HYDROmorphone  Injectable 1 milliGRAM(s) IV Push every 30 minutes PRN respiratory distress  LORazepam   Injectable 2 milliGRAM(s) IV Push every 1 hour PRN Anxiety      Vital Signs Last 24 Hrs  T(C): 37.5 (19 Dec 2018 12:00), Max: 37.9 (19 Dec 2018 04:00)  T(F): 99.5 (19 Dec 2018 12:00), Max: 100.2 (19 Dec 2018 04:00)  HR: 84 (19 Dec 2018 13:00) (65 - 115)  BP: 134/49 (19 Dec 2018 13:00) (101/44 - 187/67)  BP(mean): 69 (19 Dec 2018 13:00) (59 - 90)  RR: 16 (19 Dec 2018 13:00) (0 - 17)  SpO2: 100% (19 Dec 2018 13:00) (97% - 100%)    PHYSICAL EXAM:    intubated, comfortable, ET tube in place   Chest no rhonchi/ wheezing  Cardio: s1S2 no murmurs  edematous extremities     LABS:                        7.1    0.48  )-----------( 47       ( 19 Dec 2018 05:20 )             23.3     12-19    148<H>  |  118<H>  |  53<H>  ----------------------------<  120<H>  4.6   |  23  |  0.65    Ca    8.6      19 Dec 2018 05:20  Phos  3.0     12-19  Mg     2.2     12-19              RADIOLOGY & ADDITIONAL STUDIES:    PATHOLOGY:
POST OPERATIVE DAY #:  1  STATUS POST: Exploration,  I&D Posterior Occipital-cervical wound                     SUBJECTIVE: Patient seen and examined. Intubated. Awake at times.    OBJECTIVE:     Vital Signs Last 24 Hrs  T(C): 37.4 (16 Dec 2018 05:00), Max: 37.4 (16 Dec 2018 02:00)  T(F): 99.3 (16 Dec 2018 05:00), Max: 99.4 (16 Dec 2018 02:00)  HR: 87 (16 Dec 2018 09:00) (72 - 103)  BP: 119/58 (16 Dec 2018 09:00) (97/35 - 175/81)  BP(mean): 72 (16 Dec 2018 09:00) (50 - 106)  RR: 11 (16 Dec 2018 09:00) (3 - 22)  SpO2: 76% (16 Dec 2018 09:00) (76% - 100%)    Intubated, awake, lethargic  HEENT: unremarkable  Neck: supple, (+) Cervical Collar  Back:          Dressing: clean/dry/intact             Drains: output per shift = 80cc  No change in neuro exam. No movement of BUE and BLE.                   I&O's Detail    15 Dec 2018 07:01  -  16 Dec 2018 07:00  --------------------------------------------------------  IN:    DOPamine Infusion: 347.1 mL    IV PiggyBack: 300 mL    sodium chloride 0.9%.: 3250 mL  Total IN: 3897.1 mL    OUT:    Accordian: 80 mL    Indwelling Catheter - Urethral: 960 mL  Total OUT: 1040 mL    Total NET: 2857.1 mL          LABS:                        8.1    0.48  )-----------( 143      ( 16 Dec 2018 05:13 )             25.9     12-16    141  |  113<H>  |  33<H>  ----------------------------<  178<H>  4.2   |  20<L>  |  0.64    Ca    8.4<L>      16 Dec 2018 05:13  Phos  3.4     12-16  Mg     1.9     12-16    TPro  6.3  /  Alb  2.2<L>  /  TBili  0.6  /  DBili  0.3<H>  /  AST  18  /  ALT  19  /  AlkPhos  96  12-14        A/P :  78y Male s/p Exploration, I&D Posterior Occipital-cervical wound for cord compression/quadraplegia             POD # 1  -    Pain control  -    DVT ppx: SCDs    -    Continue hemovac  -    Continue IV Decadron taper  -    Continue present treatment as per Critical care
Patient is a 78y old  Male who presents with a chief complaint of SOB (13 Dec 2018 19:54)      HPI: Patient is a 78y M PMHx of DMII, HTN, lung Ca stage IV with mets to neck s/p tumor removal of the neck on 10/30.  He was discharged to rehab for a few week and optimized and discharged home.  He was being followed by oncology with Dr Lam, started on chemo/RT.  He presents to the [place] ED on 12/13/18 from home with Shortness of breath, acute decline in his overall functional status, has become progressively weaker, associated with appetite suppression, increased lethargy, and generalized pain, controlled with Morphine and oxycodone.  Shortly after arrival to the ED the patient had an episode of cardiac arrest and became apneic. Patient was noticed to be in PEA, CPR was started patient was intubated by Pt was admitted to ICU on vent, dopamine.  Medicine consult called for comanagement.  Pt seen and examined, case d/w intensivist, Dr Rosales and family.  Pt is sedated on vent.      12/16/18: Had post op fluid collection which was causing cord compression -- S/P I&D for large fluid collection. Pt is opening eyes and following commands with eye, but no motor function  still intubated  12/17/18 Patient intubated but awakens to name  12/18/18 Patient intubated, awakens easily,     PAST MEDICAL & SURGICAL HISTORY:  Lung cancer: Stage IV with distant mets  Essential hypertension  Type 2 diabetes mellitus without complication, without long-term current use of insulin  H/O neck surgery: Tumor Resection    MEDICATIONS  (STANDING):  chlorhexidine 0.12% Liquid 15 milliLiter(s) Oral Mucosa two times a day  dexamethasone  Injectable 4 milliGRAM(s) IV Push every 6 hours  dextrose 5%. 1000 milliLiter(s) (50 mL/Hr) IV Continuous <Continuous>  dextrose 50% Injectable 12.5 Gram(s) IV Push once  dextrose 50% Injectable 25 Gram(s) IV Push once  dextrose 50% Injectable 25 Gram(s) IV Push once  fentaNYL    Injectable 50 MICROGram(s) IV Push once  fentaNYL   Infusion 0.5 MICROgram(s)/kG/Hr (4.13 mL/Hr) IV Continuous <Continuous>  filgrastim-sndz Injectable 480 MICROGram(s) SubCutaneous daily  influenza   Vaccine 0.5 milliLiter(s) IntraMuscular once  insulin glargine Injectable (LANTUS) 5 Unit(s) SubCutaneous every morning  insulin lispro (HumaLOG) corrective regimen sliding scale   SubCutaneous every 6 hours  pantoprazole  Injectable 40 milliGRAM(s) IV Push daily  piperacillin/tazobactam IVPB. 3.375 Gram(s) IV Intermittent every 8 hours  sodium chloride 0.9%. 1000 milliLiter(s) (75 mL/Hr) IV Continuous <Continuous>    MEDICATIONS  (PRN):  acetaminophen  Suppository .. 650 milliGRAM(s) Rectal every 6 hours PRN Temp greater or equal to 38.5C (101.3F)  dextrose 40% Gel 15 Gram(s) Oral once PRN Blood Glucose LESS THAN 70 milliGRAM(s)/deciliter  glucagon  Injectable 1 milliGRAM(s) IntraMuscular once PRN Glucose LESS THAN 70 milligrams/deciliter  LORazepam   Injectable 2 milliGRAM(s) IV Push every 3 hours PRN Agitation      PE:    Vital Signs Last 24 Hrs  T(C): 37.1 (18 Dec 2018 05:00), Max: 37.2 (17 Dec 2018 16:00)  T(F): 98.8 (18 Dec 2018 05:00), Max: 98.9 (17 Dec 2018 16:00)  HR: 67 (18 Dec 2018 08:00) (65 - 83)  BP: 89/46 (18 Dec 2018 08:00) (89/46 - 129/62)  BP(mean): 55 (18 Dec 2018 08:00) (55 - 76)  RR: 0 (18 Dec 2018 08:00) (0 - 19)  SpO2: 100% (18 Dec 2018 08:00) (99% - 100%)    GEN: OPENS EYES TO NAME, INTUBATED  (+) DANIELLE  MSK:   NOT MOVING EXTREMITIES SPONTANEOUSLY  NECK-COLLAR IN PLACE, DRESSING INTACT,  (+) HVAC WITH MINIMAL DRAINAGE BUT DISCONNECTED FROM TUBING THIS AM-[id number]-HPAG MONITOR  NEURO:  OPENS EYES ONLY-NO EVIDENCE AROM UE/LEs      LABS                          6.8    0.50  )-----------( x        ( 18 Dec 2018 08:25 )             21.9       12-18    146<H>  |  117<H>  |  54<H>  ----------------------------<  96  4.1   |  23  |  0.94    Ca    8.2<L>      18 Dec 2018 08:25  Phos  3.2     12-17  Mg     2.1     12-17
Patient is a 78y old  Male who presents with a chief complaint of SOB (13 Dec 2018 19:54)      HPI: Patient is a 78y M PMHx of DMII, HTN, lung Ca stage IV with mets to neck s/p tumor removal of the neck on 10/30.  He was discharged to rehab for a few week and optimized and discharged home.  He was being followed by oncology with Dr Lam, started on chemo/RT.  He presents to the [place] ED on 12/13/18 from home with Shortness of breath, acute decline in his overall functional status, has become progressively weaker, associated with appetite suppression, increased lethargy, and generalized pain, controlled with Morphine and oxycodone.  Shortly after arrival to the ED the patient had an episode of cardiac arrest and became apneic. Patient was noticed to be in PEA, CPR was started patient was intubated by Pt was admitted to ICU on vent, dopamine.  Medicine consult called for comanagement.  Pt seen and examined, case d/w intensivist, Dr Rosales and family.  Pt is sedated on vent.      12/16/18: Had post op fluid collection which was causing cord compression -- S/P I&D for large fluid collection. Pt is opening eyes and following commands with eye, but no motor function  still intubated  12/17/18 Patient intubated but awakens to name  12/18/18 Patient intubated, awakens easily,   12/19/18 Patient intubated-opens eyes to name, no movement  PAST MEDICAL & SURGICAL HISTORY:  Lung cancer: Stage IV with distant mets  Essential hypertension  Type 2 diabetes mellitus without complication, without long-term current use of insulin  H/O neck surgery: Tumor Resection      MEDICATIONS  (STANDING):  chlorhexidine 0.12% Liquid 15 milliLiter(s) Oral Mucosa two times a day  dexamethasone  Injectable 4 milliGRAM(s) IV Push every 6 hours  dextrose 5%. 1000 milliLiter(s) (50 mL/Hr) IV Continuous <Continuous>  dextrose 50% Injectable 12.5 Gram(s) IV Push once  dextrose 50% Injectable 25 Gram(s) IV Push once  dextrose 50% Injectable 25 Gram(s) IV Push once  fentaNYL    Injectable 50 MICROGram(s) IV Push once  fentaNYL   Infusion 0.5 MICROgram(s)/kG/Hr (4.13 mL/Hr) IV Continuous <Continuous>  filgrastim-sndz Injectable 480 MICROGram(s) SubCutaneous daily  influenza   Vaccine 0.5 milliLiter(s) IntraMuscular once  insulin glargine Injectable (LANTUS) 5 Unit(s) SubCutaneous every morning  insulin lispro (HumaLOG) corrective regimen sliding scale   SubCutaneous every 6 hours  pantoprazole  Injectable 40 milliGRAM(s) IV Push daily  piperacillin/tazobactam IVPB. 3.375 Gram(s) IV Intermittent every 8 hours  sodium chloride 0.9%. 1000 milliLiter(s) (75 mL/Hr) IV Continuous <Continuous>    MEDICATIONS  (PRN):  acetaminophen  Suppository .. 650 milliGRAM(s) Rectal every 6 hours PRN Temp greater or equal to 38.5C (101.3F)  dextrose 40% Gel 15 Gram(s) Oral once PRN Blood Glucose LESS THAN 70 milliGRAM(s)/deciliter  glucagon  Injectable 1 milliGRAM(s) IntraMuscular once PRN Glucose LESS THAN 70 milligrams/deciliter  LORazepam   Injectable 2 milliGRAM(s) IV Push every 3 hours PRN Agitation      PE:    Vital Signs Last 24 Hrs  T(C): 37.3 (19 Dec 2018 08:00), Max: 37.9 (19 Dec 2018 04:00)  T(F): 99.2 (19 Dec 2018 08:00), Max: 100.2 (19 Dec 2018 04:00)  HR: 70 (19 Dec 2018 08:00) (65 - 87)  BP: 133/51 (19 Dec 2018 08:00) (101/44 - 187/67)  BP(mean): 69 (19 Dec 2018 08:00) (59 - 90)  RR: 16 (19 Dec 2018 08:00) (0 - 16)  SpO2: 100% (19 Dec 2018 08:00) (98% - 100%)    GEN: OPENS EYES TO NAME, INTUBATED  (+) DANIELLE  NECK-COLLAR IN PLACE, DRESSING INTACT,  (+) HVAC WITH MINIMAL DRAINAGE-WILL MONITOR  NEURO:  OPENS EYES ONLY-NO EVIDENCE AROM UE/LEs, NOT MOVING EXTREMITIES SPONTANEOUSLY      LABS                          7.1    0.48  )-----------( 47       ( 19 Dec 2018 05:20 )             23.3     12-19    148<H>  |  118<H>  |  53<H>  ----------------------------<  120<H>  4.6   |  23  |  0.65    Ca    8.6      19 Dec 2018 05:20  Phos  3.0     12-19  Mg     2.2     12-19
Patient is a 78y old  Male who presents with a chief complaint of SOB (13 Dec 2018 19:54)      HPI: Patient is a 78y M PMHx of DMII, HTN, lung Ca stage IV with mets to neck s/p tumor removal of the neck on 10/30.  He was discharged to rehab for a few week and optimized and discharged home.  He was being followed by oncology with Dr Lam, started on chemo/RT.  He presents to the [place] ED on 12/13/18 from home with Shortness of breath, acute decline in his overall functional status, has become progressively weaker, associated with appetite suppression, increased lethargy, and generalized pain, controlled with Morphine and oxycodone.  Shortly after arrival to the ED the patient had an episode of cardiac arrest and became apneic. Patient was noticed to be in PEA, CPR was started patient was intubated by Pt was admitted to ICU on vent, dopamine.  Medicine consult called for comanagement.  Pt seen and examined, case d/w intensivist, Dr Rosales and family.  Pt is sedated on vent.      12/16/18: Had post op fluid collection which was causing cord compression -- S/P I&D for large fluid collection. Pt is opening eyes and following commands with eye, but no motor function  still intubated  12/17/18 Patient intubated but awakens to name    PAST MEDICAL & SURGICAL HISTORY:  Lung cancer: Stage IV with distant mets  Essential hypertension  Type 2 diabetes mellitus without complication, without long-term current use of insulin  H/O neck surgery: Tumor Resection    MEDICATIONS  (STANDING):  chlorhexidine 0.12% Liquid 15 milliLiter(s) Oral Mucosa two times a day  dexamethasone  Injectable 4 milliGRAM(s) IV Push every 6 hours  dextrose 5%. 1000 milliLiter(s) (50 mL/Hr) IV Continuous <Continuous>  dextrose 50% Injectable 12.5 Gram(s) IV Push once  dextrose 50% Injectable 25 Gram(s) IV Push once  dextrose 50% Injectable 25 Gram(s) IV Push once  fentaNYL    Injectable 50 MICROGram(s) IV Push once  fentaNYL   Infusion 0.5 MICROgram(s)/kG/Hr (4.13 mL/Hr) IV Continuous <Continuous>  filgrastim-sndz Injectable 480 MICROGram(s) SubCutaneous daily  influenza   Vaccine 0.5 milliLiter(s) IntraMuscular once  insulin glargine Injectable (LANTUS) 5 Unit(s) SubCutaneous every morning  insulin lispro (HumaLOG) corrective regimen sliding scale   SubCutaneous every 6 hours  pantoprazole  Injectable 40 milliGRAM(s) IV Push daily  piperacillin/tazobactam IVPB. 3.375 Gram(s) IV Intermittent every 8 hours  sodium chloride 0.9%. 1000 milliLiter(s) (75 mL/Hr) IV Continuous <Continuous>    MEDICATIONS  (PRN):  acetaminophen  Suppository .. 650 milliGRAM(s) Rectal every 6 hours PRN Temp greater or equal to 38.5C (101.3F)  dextrose 40% Gel 15 Gram(s) Oral once PRN Blood Glucose LESS THAN 70 milliGRAM(s)/deciliter  glucagon  Injectable 1 milliGRAM(s) IntraMuscular once PRN Glucose LESS THAN 70 milligrams/deciliter  LORazepam   Injectable 2 milliGRAM(s) IV Push every 3 hours PRN Agitation      PE:    Vital Signs Last 24 Hrs  T(C): 37.3 (17 Dec 2018 06:00), Max: 37.7 (16 Dec 2018 16:00)  T(F): 99.1 (17 Dec 2018 06:00), Max: 99.8 (16 Dec 2018 16:00)  HR: 87 (17 Dec 2018 06:00) (76 - 104)  BP: 96/49 (17 Dec 2018 06:00) (86/46 - 130/64)  BP(mean): 57 (17 Dec 2018 06:00) (55 - 73)  RR: 13 (17 Dec 2018 06:00) (0 - 20)  SpO2: 100% (17 Dec 2018 06:00) (76% - 100%)    GEN: OPENS EYES TO NAME, INTUBATED  (+) DANIELLE  MSK:   NOT MOVING EXTREMITIES SPONTANEOUSLY  NECK-COLLAR IN PLACE, DRESSING INTACT,  (+) HVAC  NEURO:  OPENS EYES ONLY-NO EVIDENCE AROM UE/LEs      LABS                                  7.1    0.43  )-----------( 100      ( 17 Dec 2018 06:26 )             23.0     12-17    143  |  113<H>  |  42<H>  ----------------------------<  156<H>  4.3   |  23  |  0.83    Ca    8.1<L>      17 Dec 2018 06:26  Phos  3.2     12-17  Mg     2.1     12-17
Patient is a 78y old  Male who presents with a chief complaint of SOB (13 Dec 2018 19:54)      HPI: Patient is a 78y M PMHx of DMII, HTN, lung Ca stage IV with mets to neck s/p tumor removal of the neck on 10/30.  He was discharged to rehab for a few week and optimized and discharged home.  He was being followed by oncology with Dr Lam, started on chemo/RT.  He presents to the [facility] ED on 12/13/18 from home with Shortness of breath, acute decline in his overall functional status, has become progressively weaker, associated with appetite suppression, increased lethargy, and generalized pain, controlled with Morphine and oxycodone.  Shortly after arrival to the ED the patient had an episode of cardiac arrest and became apneic. Patient was noticed to be in PEA, CPR was started patient was intubated by Pt was admitted to ICU on vent, dopamine.  Medicine consult called for comanagement.  Pt seen and examined, case d/w intensivist, Dr Rosales and family.  Pt is sedated on vent.      12/15/18: pt on vent, opens eyes, not sedated and not moving any extremities  12/16/18: Yest events noted -- had post op fluid collection which was causing cord compression -- went to OR last night with Alongi, This am pt is opening eyes and following commands with eye, but no motor function  still intubated    ros: unable to obtain due to mental status      Vital Signs Last 24 Hrs  T(C): 37.3 (16 Dec 2018 10:00), Max: 37.4 (16 Dec 2018 02:00)  T(F): 99.1 (16 Dec 2018 10:00), Max: 99.4 (16 Dec 2018 02:00)  HR: 84 (16 Dec 2018 10:00) (72 - 103)  BP: 101/47 (16 Dec 2018 10:00) (97/35 - 175/81)  BP(mean): 59 (16 Dec 2018 10:00) (50 - 106)  RR: 0 (16 Dec 2018 10:00) (0 - 22)  SpO2: 100% (16 Dec 2018 10:00) (76% - 100%)    GEN: OPENS EYES mood stable  HEENT:   NC/AT, EOMI, INTUBATED    NECK:   supple    CV:  +S1, +S2, regular, no murmurs or rubs    RESP:   lungs clear to auscultation bilaterally, no wheezing, rales, rhonchi, good air entry bilaterally    GI:  abdomen soft, non-tender, non-distended, normal BS,  no abdominal masses, no palpable masses    RECTAL:  not examined    :  POS DANIELLE    MSK:   NOT MOVING EXTREMITIES SPONTANEOUSLY    EXT:   no clubbing, no cyanosis, no edema, no calf pain, swelling or erythema    VASCULAR:  pulses equal and symmetric in the upper and lower extremities    NEURO:  OPENS EYES ONLY    SKIN:  no ulcers, lesions or rashes      LABS                              8.1    0.48  )-----------( 143      ( 16 Dec 2018 05:13 )             25.9     12-16    141  |  113<H>  |  33<H>  ----------------------------<  178<H>  4.2   |  20<L>  |  0.64    Ca    8.4<L>      16 Dec 2018 05:13  Phos  3.4     12-16  Mg     1.9     12-16    MEDICATIONS  (STANDING):  chlorhexidine 0.12% Liquid 15 milliLiter(s) Oral Mucosa two times a day  dexamethasone  Injectable 4 milliGRAM(s) IV Push every 6 hours  dextrose 5%. 1000 milliLiter(s) (50 mL/Hr) IV Continuous <Continuous>  dextrose 50% Injectable 12.5 Gram(s) IV Push once  dextrose 50% Injectable 25 Gram(s) IV Push once  dextrose 50% Injectable 25 Gram(s) IV Push once  influenza   Vaccine 0.5 milliLiter(s) IntraMuscular once  insulin glargine Injectable (LANTUS) 5 Unit(s) SubCutaneous every morning  insulin lispro (HumaLOG) corrective regimen sliding scale   SubCutaneous every 6 hours  pantoprazole  Injectable 40 milliGRAM(s) IV Push daily  piperacillin/tazobactam IVPB. 3.375 Gram(s) IV Intermittent every 8 hours  sodium chloride 0.9%. 1000 milliLiter(s) (75 mL/Hr) IV Continuous <Continuous>    MEDICATIONS  (PRN):  acetaminophen  Suppository .. 650 milliGRAM(s) Rectal every 6 hours PRN Temp greater or equal to 38.5C (101.3F)  dextrose 40% Gel 15 Gram(s) Oral once PRN Blood Glucose LESS THAN 70 milliGRAM(s)/deciliter  glucagon  Injectable 1 milliGRAM(s) IntraMuscular once PRN Glucose LESS THAN 70 milligrams/deciliter  HYDROmorphone  Injectable 1 milliGRAM(s) IV Push every 4 hours PRN Moderate Pain (4 - 6)  LORazepam   Injectable 2 milliGRAM(s) IV Push every 3 hours PRN Agitation
Patient is a 78y old  Male who presents with a chief complaint of SOB (13 Dec 2018 19:54)      HPI: Patient is a 78y M PMHx of DMII, HTN, lung Ca stage IV with mets to neck s/p tumor removal of the neck on 10/30.  He was discharged to rehab for a few week and optimized and discharged home.  He was being followed by oncology with Dr Lam, started on chemo/RT.  He presents to the [facility] ED on 18 from home with Shortness of breath, acute decline in his overall functional status, has become progressively weaker, associated with appetite suppression, increased lethargy, and generalized pain, controlled with Morphine and oxycodone.  Shortly after arrival to the ED the patient had an episode of cardiac arrest and became apneic. Patient was noticed to be in PEA, CPR was started patient was intubated by Pt was admitted to ICU on vent, dopamine.  Medicine consult called for comanagement.  Pt seen and examined, case d/w intensivist, Dr Rosales and family.  Pt is sedated on vent.      12/15/18: pt on vent, opens eyes, not sedated and not moving any extremities      ros: unable to obtain due to mental status      Vital Signs Last 24 Hrs  T(C): 36.7 (15 Dec 2018 09:00), Max: 38.7 (14 Dec 2018 16:00)  T(F): 98 (15 Dec 2018 09:00), Max: 101.6 (14 Dec 2018 16:00)  HR: 97 (15 Dec 2018 09:00) (93 - 132)  BP: 136/111 (15 Dec 2018 09:00) (83/50 - 141/57)  BP(mean): 116 (15 Dec 2018 09:00) (57 - 116)  RR: 22 (15 Dec 2018 09:00) (13 - 22)  SpO2: 100% (15 Dec 2018 09:00) (97% - 100%)      Vital Signs Last 24 Hrs  T(C): 36.7 (15 Dec 2018 09:00), Max: 38.7 (14 Dec 2018 16:00)  T(F): 98 (15 Dec 2018 09:00), Max: 101.6 (14 Dec 2018 16:00)  HR: 97 (15 Dec 2018 09:00) (93 - 132)  BP: 136/111 (15 Dec 2018 09:00) (83/50 - 141/57)  BP(mean): 116 (15 Dec 2018 09:00) (57 - 116)  RR: 22 (15 Dec 2018 09:00) (13 - 22)  SpO2: 100% (15 Dec 2018 09:00) (97% - 100%)    GEN: OPENS EYES mood stable  HEENT:   NC/AT, EOMI, INTUBATED    NECK:   supple    CV:  +S1, +S2, regular, no murmurs or rubs    RESP:   lungs clear to auscultation bilaterally, no wheezing, rales, rhonchi, good air entry bilaterally    GI:  abdomen soft, non-tender, non-distended, normal BS,  no abdominal masses, no palpable masses    RECTAL:  not examined    :  POS DANIELLE    MSK:   NOT MOVING EXTREMITIES SPONTANEOUSLY    EXT:   no clubbing, no cyanosis, no edema, no calf pain, swelling or erythema    VASCULAR:  pulses equal and symmetric in the upper and lower extremities    NEURO:  OPENS EYES ONLY    SKIN:  no ulcers, lesions or rashes      LABS                            8.9    1.07  )-----------( 199      ( 15 Dec 2018 07:13 )             28.7     12-15    139  |  109<H>  |  36<H>  ----------------------------<  148<H>  3.6   |  23  |  0.82    Ca    8.8      15 Dec 2018 07:13  Phos  3.2     12-15  Mg     2.0     12-15    TPro  6.3  /  Alb  2.2<L>  /  TBili  0.6  /  DBili  0.3<H>  /  AST  18  /  ALT  19  /  AlkPhos  96  12-14    CARDIAC MARKERS ( 13 Dec 2018 12:00 )  0.042 ng/mL / x     / x     / x     / x          LIVER FUNCTIONS - ( 14 Dec 2018 10:17 )  Alb: 2.2 g/dL / Pro: 6.3 gm/dL / ALK PHOS: 96 U/L / ALT: 19 U/L / AST: 18 U/L / GGT: x           PT/INR - ( 13 Dec 2018 12:00 )   PT: 12.2 sec;   INR: 1.10 ratio         PTT - ( 13 Dec 2018 12:00 )  PTT:25.7 sec  Urinalysis Basic - ( 13 Dec 2018 13:00 )    Color: Yellow / Appearance: Clear / S.015 / pH: x  Gluc: x / Ketone: Negative  / Bili: Negative / Urobili: Negative mg/dL   Blood: x / Protein: 30 mg/dL / Nitrite: Negative   Leuk Esterase: Negative / RBC: 3-5 /HPF / WBC 0-2   Sq Epi: x / Non Sq Epi: Few / Bacteria: Occasional      ABG - ( 13 Dec 2018 19:34 )  pH, Arterial: 7.33  pH, Blood: x     /  pCO2: 44    /  pO2: 165   / HCO3: 22    / Base Excess: -3.1  /  SaO2: 98
Post operative day 1 s/p incision and drainage of cervical wound.  Pt weaned off pressors overnight.  Still no extremity movements.  Critically ill.  Plans as per Dr. Hernandez/ICU team
Pt is sedated on vent, wife at bedside.    Date of Service: 12-19-18 @ 08:09    Vital Signs Last 24 Hrs  T(C): 37.9 (19 Dec 2018 04:00), Max: 37.9 (19 Dec 2018 04:00)  T(F): 100.2 (19 Dec 2018 04:00), Max: 100.2 (19 Dec 2018 04:00)  HR: 80 (19 Dec 2018 06:00) (65 - 87)  BP: 126/57 (19 Dec 2018 06:00) (101/44 - 187/67)  BP(mean): 75 (19 Dec 2018 06:00) (59 - 90)  RR: 11 (19 Dec 2018 06:00) (0 - 16)  SpO2: 100% (19 Dec 2018 06:00) (98% - 100%)    Daily     Daily     I&O's Detail    18 Dec 2018 07:01  -  19 Dec 2018 07:00  --------------------------------------------------------  IN:    fentaNYL  Infusion: 586.6 mL    sodium chloride 0.45%.: 1050 mL  Total IN: 1636.6 mL    OUT:    Indwelling Catheter - Urethral: 1400 mL  Total OUT: 1400 mL    Total NET: 236.6 mL          CAPILLARY BLOOD GLUCOSE      POCT Blood Glucose.: 125 mg/dL (19 Dec 2018 05:32)  POCT Blood Glucose.: 141 mg/dL (18 Dec 2018 23:47)  POCT Blood Glucose.: 138 mg/dL (18 Dec 2018 17:05)  POCT Blood Glucose.: 101 mg/dL (18 Dec 2018 11:23)                                      7.1    0.48  )-----------( 47       ( 19 Dec 2018 05:20 )             23.3       12-19    148<H>  |  118<H>  |  53<H>  ----------------------------<  120<H>  4.6   |  23  |  0.65    Ca    8.6      19 Dec 2018 05:20  Phos  3.0     12-19  Mg     2.2     12-19                        MEDICATIONS  (STANDING):  dexamethasone  Injectable 4 milliGRAM(s) IV Push every 6 hours  dextrose 5%. 1000 milliLiter(s) (50 mL/Hr) IV Continuous <Continuous>  dextrose 50% Injectable 12.5 Gram(s) IV Push once  dextrose 50% Injectable 25 Gram(s) IV Push once  dextrose 50% Injectable 25 Gram(s) IV Push once  enoxaparin Injectable 40 milliGRAM(s) SubCutaneous daily  fentaNYL   Infusion 0.5 MICROgram(s)/kG/Hr (4.13 mL/Hr) IV Continuous <Continuous>  filgrastim-sndz Injectable 480 MICROGram(s) SubCutaneous daily  insulin lispro (HumaLOG) corrective regimen sliding scale   SubCutaneous every 6 hours  pantoprazole  Injectable 40 milliGRAM(s) IV Push daily  sodium chloride 0.45%. 1000 milliLiter(s) (75 mL/Hr) IV Continuous <Continuous>    MEDICATIONS  (PRN):  acetaminophen  Suppository .. 650 milliGRAM(s) Rectal every 6 hours PRN Temp greater or equal to 38.5C (101.3F)  dextrose 40% Gel 15 Gram(s) Oral once PRN Blood Glucose LESS THAN 70 milliGRAM(s)/deciliter  glucagon  Injectable 1 milliGRAM(s) IntraMuscular once PRN Glucose LESS THAN 70 milligrams/deciliter  HYDROmorphone  Injectable 1 milliGRAM(s) IV Push every 30 minutes PRN respiratory distress  LORazepam   Injectable 2 milliGRAM(s) IV Push every 1 hour PRN Anxiety
Weekend events noted.  S/p post op fluids collection removal in OR with no improvement in quadriplegia.  Pt sedated on vent, wife at bedside.      Date of Service: 12-17-18 @ 08:51    Vital Signs Last 24 Hrs  T(C): 37.3 (17 Dec 2018 06:00), Max: 37.7 (16 Dec 2018 16:00)  T(F): 99.1 (17 Dec 2018 06:00), Max: 99.8 (16 Dec 2018 16:00)  HR: 87 (17 Dec 2018 06:00) (76 - 104)  BP: 96/49 (17 Dec 2018 06:00) (86/46 - 130/64)  BP(mean): 57 (17 Dec 2018 06:00) (55 - 73)  RR: 13 (17 Dec 2018 06:00) (0 - 20)  SpO2: 100% (17 Dec 2018 06:00) (76% - 100%)    Daily     Daily     I&O's Detail    16 Dec 2018 07:01  -  17 Dec 2018 07:00  --------------------------------------------------------  IN:    fentaNYL  Infusion: 168.5 mL    IV PiggyBack: 300 mL    sodium chloride 0.9%.: 1725 mL  Total IN: 2193.5 mL    OUT:    Accordian: 35 mL    Indwelling Catheter - Urethral: 725 mL  Total OUT: 760 mL    Total NET: 1433.5 mL          CAPILLARY BLOOD GLUCOSE      POCT Blood Glucose.: 172 mg/dL (17 Dec 2018 05:19)  POCT Blood Glucose.: 189 mg/dL (16 Dec 2018 23:45)  POCT Blood Glucose.: 160 mg/dL (16 Dec 2018 17:20)  POCT Blood Glucose.: 159 mg/dL (16 Dec 2018 12:13)                                      7.1    0.43  )-----------( 100      ( 17 Dec 2018 06:26 )             23.0       12-17    143  |  113<H>  |  42<H>  ----------------------------<  156<H>  4.3   |  23  |  0.83    Ca    8.1<L>      17 Dec 2018 06:26  Phos  3.2     12-17  Mg     2.1     12-17                        MEDICATIONS  (STANDING):  chlorhexidine 0.12% Liquid 15 milliLiter(s) Oral Mucosa two times a day  dexamethasone  Injectable 4 milliGRAM(s) IV Push every 6 hours  dextrose 5%. 1000 milliLiter(s) (50 mL/Hr) IV Continuous <Continuous>  dextrose 50% Injectable 12.5 Gram(s) IV Push once  dextrose 50% Injectable 25 Gram(s) IV Push once  dextrose 50% Injectable 25 Gram(s) IV Push once  fentaNYL    Injectable 50 MICROGram(s) IV Push once  fentaNYL   Infusion 0.5 MICROgram(s)/kG/Hr (4.13 mL/Hr) IV Continuous <Continuous>  filgrastim-sndz Injectable 480 MICROGram(s) SubCutaneous daily  influenza   Vaccine 0.5 milliLiter(s) IntraMuscular once  insulin glargine Injectable (LANTUS) 5 Unit(s) SubCutaneous every morning  insulin lispro (HumaLOG) corrective regimen sliding scale   SubCutaneous every 6 hours  pantoprazole  Injectable 40 milliGRAM(s) IV Push daily  piperacillin/tazobactam IVPB. 3.375 Gram(s) IV Intermittent every 8 hours  sodium chloride 0.9%. 1000 milliLiter(s) (75 mL/Hr) IV Continuous <Continuous>    MEDICATIONS  (PRN):  acetaminophen  Suppository .. 650 milliGRAM(s) Rectal every 6 hours PRN Temp greater or equal to 38.5C (101.3F)  dextrose 40% Gel 15 Gram(s) Oral once PRN Blood Glucose LESS THAN 70 milliGRAM(s)/deciliter  glucagon  Injectable 1 milliGRAM(s) IntraMuscular once PRN Glucose LESS THAN 70 milligrams/deciliter  LORazepam   Injectable 2 milliGRAM(s) IV Push every 3 hours PRN Agitation
CC:  Resp failure/cardiac arrest    HPI:    79 y/o male with DMII, HTN, lung Ca stage IV with mets to neck s/p tumor removal of the neck on 10/30 at [facility], presents to the [place] ED on 18 from home with Shortness of breath. S/P surgery, patient spent three weeks in [facility] and was discharged day before . Over this time after surgery the patent has been started on chemotherapy and has since seen an acute decline in his overall functional status, has become progressively weaker, associated with appetite suppression, increased lethargy, and generalized pain, controlled with Morphine and oxycodone. Shortly after arrival to the ED the patient had an episode of cardiac arrest and became apneic. Patient was noticed to be in PEA, CPR was started patient was intubated by Anesthesia and placed on Propofol. ICU was consulted    :  ICU D # 1.  Pt seen and examined in ICU--vented--sedated--on Dopa gtt 10--Improved hemodynamics--family made pt DNR.  CTA chest--No PE/enlarged R lung mass/multiple bony mets and spine mets      PMH:  As above.     PSH:  As above.     FH: Non Contributory other than those listed in HPI    Social History:      MEDICATIONS  (STANDING):  chlorhexidine 0.12% Liquid 15 milliLiter(s) Oral Mucosa two times a day  dextrose 5%. 1000 milliLiter(s) (50 mL/Hr) IV Continuous <Continuous>  dextrose 50% Injectable 12.5 Gram(s) IV Push once  dextrose 50% Injectable 25 Gram(s) IV Push once  dextrose 50% Injectable 25 Gram(s) IV Push once  DOPamine Infusion 10 MICROgram(s)/kG/Min (30.975 mL/Hr) IV Continuous <Continuous>  enoxaparin Injectable 40 milliGRAM(s) SubCutaneous every 24 hours  influenza   Vaccine 0.5 milliLiter(s) IntraMuscular once  insulin lispro (HumaLOG) corrective regimen sliding scale   SubCutaneous every 6 hours  pantoprazole  Injectable 40 milliGRAM(s) IV Push daily  phenylephrine    Infusion 0.1 MICROgram(s)/kG/Min (3.097 mL/Hr) IV Continuous <Continuous>  piperacillin/tazobactam IVPB. 3.375 Gram(s) IV Intermittent every 8 hours  sodium chloride 0.9%. 1000 milliLiter(s) (75 mL/Hr) IV Continuous <Continuous>    MEDICATIONS  (PRN):  acetaminophen  Suppository .. 650 milliGRAM(s) Rectal every 6 hours PRN Temp greater or equal to 38.5C (101.3F)  dextrose 40% Gel 15 Gram(s) Oral once PRN Blood Glucose LESS THAN 70 milliGRAM(s)/deciliter  glucagon  Injectable 1 milliGRAM(s) IntraMuscular once PRN Glucose LESS THAN 70 milligrams/deciliter  HYDROmorphone  Injectable 1 milliGRAM(s) IV Push every 4 hours PRN Moderate Pain (4 - 6)  LORazepam   Injectable 2 milliGRAM(s) IV Push every 3 hours PRN Agitation      Allergies: NKDA    ROS:  SEE BELOW        ICU Vital Signs Last 24 Hrs  T(C): 35.7 (14 Dec 2018 05:00), Max: 40.2 (14 Dec 2018 00:00)  T(F): 96.3 (14 Dec 2018 05:00), Max: 104.3 (14 Dec 2018 00:00)  HR: 100 (14 Dec 2018 09:00) (58 - 152)  BP: 124/62 (14 Dec 2018 09:00) (46/27 - 186/74)  BP(mean): 75 (14 Dec 2018 09:00) (31 - 101)  ABP: --  ABP(mean): --  RR: 15 (14 Dec 2018 09:00) (8 - 24)  SpO2: 100% (14 Dec 2018 09:00) (92% - 100%)      Mode: AC/ CMV (Assist Control/ Continuous Mandatory Ventilation)  RR (machine): 16  TV (machine): 500  FiO2: 50  PEEP: 7.5  ITime: 1  PIP: 23      I&O's Summary    13 Dec 2018 07:  -  14 Dec 2018 07:00  --------------------------------------------------------  IN: 4467.2 mL / OUT: 1600 mL / NET: 2867.2 mL    14 Dec 2018 07:01  -  14 Dec 2018 12:00  --------------------------------------------------------  IN: 212 mL / OUT: 0 mL / NET: 212 mL        Physical Exam:  SEE BELOW                          10.2   4.32  )-----------( 286      ( 14 Dec 2018 10:17 )             32.3       12-14    139  |  109<H>  |  38<H>  ----------------------------<  166<H>  4.5   |  24  |  0.89    Ca    8.8      14 Dec 2018 10:17  Mg     2.0     12-13    TPro  6.3  /  Alb  2.2<L>  /  TBili  0.6  /  DBili  0.3<H>  /  AST  18  /  ALT  19  /  AlkPhos  96  12-14      CARDIAC MARKERS ( 13 Dec 2018 12:00 )  0.042 ng/mL / x     / x     / x     / x            ABG - ( 13 Dec 2018 19:34 )  pH, Arterial: 7.33  pH, Blood: x     /  pCO2: 44    /  pO2: 165   / HCO3: 22    / Base Excess: -3.1  /  SaO2: 98                  Urinalysis Basic - ( 13 Dec 2018 13:00 )    Color: Yellow / Appearance: Clear / S.015 / pH: x  Gluc: x / Ketone: Negative  / Bili: Negative / Urobili: Negative mg/dL   Blood: x / Protein: 30 mg/dL / Nitrite: Negative   Leuk Esterase: Negative / RBC: 3-5 /HPF / WBC 0-2   Sq Epi: x / Non Sq Epi: Few / Bacteria: Occasional        DVT Prophylaxis:                                                            Contraindication:     Advanced Directives:    Discussed with:    Visit Information:  Time spent excluding procedure:  45 cc mins    ** Time is exclusive of billed procedures and/or teaching and/or routine family updates.
Pt is sedated on vent, was desaturating, O2 increased to 100% on vent.    Date of Service: 12-18-18 @ 12:11    Vital Signs Last 24 Hrs  T(C): 37.1 (18 Dec 2018 09:00), Max: 37.2 (17 Dec 2018 16:00)  T(F): 98.8 (18 Dec 2018 09:00), Max: 98.9 (17 Dec 2018 16:00)  HR: 72 (18 Dec 2018 11:00) (65 - 80)  BP: 107/54 (18 Dec 2018 10:00) (89/46 - 129/62)  BP(mean): 66 (18 Dec 2018 10:00) (55 - 76)  RR: 0 (18 Dec 2018 11:00) (0 - 19)  SpO2: 100% (18 Dec 2018 11:00) (99% - 100%)    Daily     Daily     I&O's Detail    17 Dec 2018 07:01  -  18 Dec 2018 07:00  --------------------------------------------------------  IN:    fentaNYL  Infusion: 555.8 mL    IV PiggyBack: 50 mL    sodium chloride 0.9%: 750 mL  Total IN: 1355.8 mL    OUT:    Accordian: 5 mL    Indwelling Catheter - Urethral: 1180 mL  Total OUT: 1185 mL    Total NET: 170.8 mL          CAPILLARY BLOOD GLUCOSE      POCT Blood Glucose.: 101 mg/dL (18 Dec 2018 11:23)  POCT Blood Glucose.: 165 mg/dL (17 Dec 2018 12:45)                                      6.8    0.50  )-----------( 71       ( 18 Dec 2018 08:25 )             21.9       12-18    146<H>  |  117<H>  |  54<H>  ----------------------------<  96  4.1   |  23  |  0.94    Ca    8.2<L>      18 Dec 2018 08:25  Phos  2.3     12-18  Mg     2.1     12-17                        MEDICATIONS  (STANDING):  dexamethasone  Injectable 4 milliGRAM(s) IV Push every 6 hours  dextrose 5%. 1000 milliLiter(s) (50 mL/Hr) IV Continuous <Continuous>  dextrose 50% Injectable 12.5 Gram(s) IV Push once  dextrose 50% Injectable 25 Gram(s) IV Push once  dextrose 50% Injectable 25 Gram(s) IV Push once  enoxaparin Injectable 40 milliGRAM(s) SubCutaneous daily  fentaNYL   Infusion 0.5 MICROgram(s)/kG/Hr (4.13 mL/Hr) IV Continuous <Continuous>  filgrastim-sndz Injectable 480 MICROGram(s) SubCutaneous daily  insulin lispro (HumaLOG) corrective regimen sliding scale   SubCutaneous every 6 hours  pantoprazole  Injectable 40 milliGRAM(s) IV Push daily    MEDICATIONS  (PRN):  acetaminophen  Suppository .. 650 milliGRAM(s) Rectal every 6 hours PRN Temp greater or equal to 38.5C (101.3F)  dextrose 40% Gel 15 Gram(s) Oral once PRN Blood Glucose LESS THAN 70 milliGRAM(s)/deciliter  glucagon  Injectable 1 milliGRAM(s) IntraMuscular once PRN Glucose LESS THAN 70 milligrams/deciliter  HYDROmorphone  Injectable 1 milliGRAM(s) IV Push every 30 minutes PRN respiratory distress  LORazepam   Injectable 2 milliGRAM(s) IV Push every 1 hour PRN Anxiety
77 y/o male with DMII, HTN, lung Ca stage IV with mets to neck s/p tumor removal of the neck on 10/30 at [facility], presents to the [place] ED on 12/13/18 from home with Shortness of breath. S/P surgery, patient spent three weeks in [facility] and was discharged day before thanksgiving. Over this time after surgery the patent has been started on chemotherapy and has since seen an acute decline in his overall functional status, has become progressively weaker, associated with appetite suppression, increased lethargy, and generalized pain, controlled with Morphine and oxycodone. Shortly after arrival to the ED the patient had an episode of cardiac arrest and became apneic.     Patient was noticed to be in PEA, CPR was started and intubated by Anesthesia (difficult airway) and placed on Propofol. ICU was consulted    12/14:  ICU D # 1.  Pt seen and examined in ICU--vented--sedated--on Dopa gtt 10--Improved hemodynamics--family made pt DNR.  CTA chest--No PE/enlarged R lung mass/multiple bony mets and spine mets    12/15:  ICU D # 2.  Pt seen and examined in ICU--vented--awake and alert--Unable to move extreme, able to shrug shoulders.  Vent changed to SBT. Remains  Neuro findings d/w dr robin (covering Dr cervantes)--she will obtain spine/NSGY eval    12/16:  ICU D # 3.  Above noted--under went emergent C-spine decompression Sx--still not able to move this morning.  Off Dopa gtt.  FS slightly higher on decadron.  On 80% FIO2 post op     12/17: Patient seen in bed and remains without movement of his extremities.  He is awake and opens his eyes.      12/18: Family has decided to hold off on the terminal extubation for now.  Decreased O2 sat this morning         PAST MEDICAL & SURGICAL HISTORY:  Lung cancer: Stage IV with distant mets  Essential hypertension  Type 2 diabetes mellitus without complication, without long-term current use of insulin  H/O neck surgery: Tumor Resection      FAMILY HISTORY:  Family history of Parkinson disease (Father): Father      Social Hx:    Allergies    No Known Allergies    Intolerances            ICU Vital Signs Last 24 Hrs  T(C): 37.1 (18 Dec 2018 05:00), Max: 37.2 (17 Dec 2018 16:00)  T(F): 98.8 (18 Dec 2018 05:00), Max: 98.9 (17 Dec 2018 16:00)  HR: 67 (18 Dec 2018 08:00) (65 - 83)  BP: 89/46 (18 Dec 2018 08:00) (89/46 - 129/62)  BP(mean): 55 (18 Dec 2018 08:00) (55 - 76)  ABP: --  ABP(mean): --  RR: 0 (18 Dec 2018 08:00) (0 - 19)  SpO2: 100% (18 Dec 2018 08:00) (99% - 100%)      Mode: AC/ CMV (Assist Control/ Continuous Mandatory Ventilation)  RR (machine): 16  TV (machine): 500  FiO2: 40  PEEP: 5  ITime: 1  PIP: 23      I&O's Summary    17 Dec 2018 07:01  -  18 Dec 2018 07:00  --------------------------------------------------------  IN: 1355.8 mL / OUT: 1185 mL / NET: 170.8 mL                              6.8    0.50  )-----------( x        ( 18 Dec 2018 08:25 )             21.9       12-18    146<H>  |  117<H>  |  54<H>  ----------------------------<  96  4.1   |  23  |  0.94    Ca    8.2<L>      18 Dec 2018 08:25  Phos  2.3     12-18  Mg     2.1     12-17                      MEDICATIONS  (STANDING):  dexamethasone  Injectable 4 milliGRAM(s) IV Push every 6 hours  dextrose 5%. 1000 milliLiter(s) (50 mL/Hr) IV Continuous <Continuous>  dextrose 50% Injectable 12.5 Gram(s) IV Push once  dextrose 50% Injectable 25 Gram(s) IV Push once  dextrose 50% Injectable 25 Gram(s) IV Push once  enoxaparin Injectable 40 milliGRAM(s) SubCutaneous daily  fentaNYL   Infusion 0.5 MICROgram(s)/kG/Hr (4.13 mL/Hr) IV Continuous <Continuous>  insulin lispro (HumaLOG) corrective regimen sliding scale   SubCutaneous every 6 hours  pantoprazole  Injectable 40 milliGRAM(s) IV Push daily    MEDICATIONS  (PRN):  acetaminophen  Suppository .. 650 milliGRAM(s) Rectal every 6 hours PRN Temp greater or equal to 38.5C (101.3F)  dextrose 40% Gel 15 Gram(s) Oral once PRN Blood Glucose LESS THAN 70 milliGRAM(s)/deciliter  glucagon  Injectable 1 milliGRAM(s) IntraMuscular once PRN Glucose LESS THAN 70 milligrams/deciliter  HYDROmorphone  Injectable 1 milliGRAM(s) IV Push every 30 minutes PRN respiratory distress  LORazepam   Injectable 2 milliGRAM(s) IV Push every 1 hour PRN Anxiety      DVT Prophylaxis: Lovenox    Advanced Directives:  Discussed with:    Visit Information: 45 min    ** Time is exclusive of billed procedures and/or teaching and/or routine family updates.

## 2018-12-19 NOTE — PROGRESS NOTE ADULT - NS NEC GEN PE MLT EXAM PC
Tele tech notified RN at about 1835 that pt HR consistently 145 for the last couple seconds.  ISABEL Rao read tele on monitor at nurses station and stated it was reading SVT and HR still 145.  Writer and ISABEL Rao went to check on patient and at the time was asymptomatic, all other vitals WNL.  STAT EKG released and hospitalist Dr. Lee notified.  New order received. At about 1850,  Patient converted back into NSR.  EKG the same as prior EKG performed on 4/3/18.  RN called Dr. Lee with updates, no new orders at this time, will continue to monitor.   
detailed exam

## 2018-12-19 NOTE — PROGRESS NOTE ADULT - CARDIOVASCULAR DETAILS
irregular rate and rhythm
positive S2/positive S1
irregular rate and rhythm
positive S1/positive S2
positive S2/positive S1
positive S2/positive S1
positive S1/positive S2
positive S1/positive S2

## 2018-12-19 NOTE — PROGRESS NOTE ADULT - ASSESSMENT
Pt 78y M PMHx of DMII, HTN, lung Ca stage IV with mets to neck s/p tumor removal of the neck on 10/30.  He was discharged to rehab for a few week and optimized and discharged home.  He was being followed by oncology with Dr Lam, started on chemo/RT.  He presents to the [facility] ED on 12/13/18 from home with Shortness of breath, acute decline in his overall functional status, has become progressively weaker, associated with appetite suppression, increased lethargy, and generalized pain, controlled with Morphine and oxycodone.  Shortly after arrival to the ED the patient had an episode of cardiac arrest and became apneic. Patient was noticed to be in PEA, CPR was started patient was intubated by Pt was admitted to ICU on vent, dopamine.  Medicine consult called for comanagement.     * Acute Respiratory Failure-CT negative for PE ? primary cardiac event.  Continue vent support, empiric zosyn.  Family leaning towards terminal extubation soon.  * Pancytopenia-due to chemo, supportive care, Neupogen  * Quadriplegia- due to post op fluid collection with cord compression, s/p evacuation, continue steroids.    * Cardiac Arrest-s/p CPR, continue supportive care per ICU   * Metastatic Lung CA-progressing, is on chemo and RT.  Monitor for now, onc input noted.  Family does not want future CPR.      * Type 2 Diabetes- ISS for now   * GERD-continue Protonix  * Proph- lovenox and Protonix   * Disp-over all poor prognosis, guarded, now DNR, family leaning towards comfort care, await palliative care eval  * Comm- d/w wife in details, all questions answered, emotional support provided, RN

## 2018-12-19 NOTE — PROGRESS NOTE ADULT - COMMENTS
Unobtainable
Pt is sedated on vent and not able to provide
Pt is sedated on vent and not able to provide
Unobtainable
Pt is sedated on vent and not able to provide
Unobtainable

## 2023-01-04 NOTE — ED PROVIDER NOTE - NS_ ATTENDINGSCRIBEDETAILS _ED_A_ED_FT
There are no Wet Read(s) to document. The scribe's documentation has been prepared under my direction and personally reviewed by me in its entirety.  I confirm that the note above accurately reflects all my work, treatment, procedures, and decision making except where otherwise noted or amended by me.  Viral Rizvi M.D.

## 2023-10-15 NOTE — PROGRESS NOTE ADULT - ASSESSMENT
94 Pt is a 77 y/o female ex-smoker (smoked 1.5 ppd for many years, quit 30 years ago), HTN, type 2 diabetes, hyperlipidemia who was recently diagnosed with RLL pleural based mass along with C1 expansile mass.  Due to increasing pain he was admitted for pain control and further evaluation of his presumed metastatic disease.     * C1 Expansile Mass-concern for metastatic lung CA vs cancer.  Spine f/u noted, will need surgery which is postponed until after gallium scan, continue steroids, ESR, CRP low therefore less likely infectious, f/u echo and cults, ID eval noted  No neurological deficits at this time, RT input noted, no need for urgent RT at this time.  Cards preop evaluation noted, family made arrangements for transfer to [facility], pt agreed as well.  Await echo, continue pain control, bowel regimen, heme-onc following.    * HTN-stable, continue Norvasc, losartan   * Scant Rectal Bleeding-? hemorrhoids, monitor for now if returns than consider GI eval.    * Type 2 Diabetes-uncontrolled due to steroids, continue to hold metformin, continue current dose of Lantus, continue ISS and titrate further as needed.  * Hyperlipidemia-continue atorvastatin  * GERD-continue Protonix  * Proph- heparin   * Disp-OOB, ambulate, transfer to [facility] after gallium scan  * Comm- d/w pt, RN, son yesterday    Time spent > 30 min

## 2024-03-11 NOTE — CONSULT NOTE ADULT - CONSULT REQUESTED BY NAME
GFR Estimate   Date Value Ref Range Status   01/13/2024 >90 >60 mL/min/1.73m2 Final   02/13/2020 >90 >60 mL/min/[1.73_m2] Final     Comment:     Non  GFR Calc  Starting 12/18/2018, serum creatinine based estimated GFR (eGFR) will be   calculated using the Chronic Kidney Disease Epidemiology Collaboration   (CKD-EPI) equation.       GFR Estimate If Black   Date Value Ref Range Status   02/13/2020 >90 >60 mL/min/[1.73_m2] Final     Comment:      GFR Calc  Starting 12/18/2018, serum creatinine based estimated GFR (eGFR) will be   calculated using the Chronic Kidney Disease Epidemiology Collaboration   (CKD-EPI) equation.          Pewar

## 2025-07-03 NOTE — PROGRESS NOTE ADULT - SUBJECTIVE AND OBJECTIVE BOX
English Pt is still c/o neck pain, uneventful night, s/p injection for gallium scan.  Pt's family decided and made arrangement on transfer to [facility] for further care.      Date of Service: 10-11-18 @ 08:53    Vital Signs Last 24 Hrs  T(C): 36.6 (11 Oct 2018 04:50), Max: 36.8 (10 Oct 2018 21:09)  T(F): 97.8 (11 Oct 2018 04:50), Max: 98.3 (10 Oct 2018 21:09)  HR: 64 (11 Oct 2018 05:37) (54 - 64)  BP: 137/56 (11 Oct 2018 04:50) (135/88 - 137/56)  BP(mean): --  RR: 18 (11 Oct 2018 04:50) (18 - 18)  SpO2: 95% (11 Oct 2018 04:50) (94% - 95%)    Daily     Daily     I&O's Detail    10 Oct 2018 07:01  -  11 Oct 2018 07:00  --------------------------------------------------------  IN:  Total IN: 0 mL    OUT:    Voided: 1075 mL  Total OUT: 1075 mL    Total NET: -1075 mL          CAPILLARY BLOOD GLUCOSE      POCT Blood Glucose.: 307 mg/dL (11 Oct 2018 07:57)  POCT Blood Glucose.: 267 mg/dL (10 Oct 2018 21:37)  POCT Blood Glucose.: 242 mg/dL (10 Oct 2018 16:43)  POCT Blood Glucose.: 398 mg/dL (10 Oct 2018 12:18)                                      11.1   8.82  )-----------( 262      ( 11 Oct 2018 06:13 )             32.7       10-10    141  |  106  |  23  ----------------------------<  316<H>  3.9   |  29  |  0.91    Ca    8.7      10 Oct 2018 12:09                        MEDICATIONS  (STANDING):  amLODIPine   Tablet 5 milliGRAM(s) Oral daily  atorvastatin 20 milliGRAM(s) Oral at bedtime  dexamethasone  Injectable 4 milliGRAM(s) IV Push every 8 hours  dextrose 5%. 1000 milliLiter(s) (50 mL/Hr) IV Continuous <Continuous>  dextrose 50% Injectable 12.5 Gram(s) IV Push once  dextrose 50% Injectable 25 Gram(s) IV Push once  dextrose 50% Injectable 25 Gram(s) IV Push once  docusate sodium 100 milliGRAM(s) Oral two times a day  heparin  Injectable 5000 Unit(s) SubCutaneous every 8 hours  insulin glargine Injectable (LANTUS) 15 Unit(s) SubCutaneous at bedtime  insulin lispro (HumaLOG) corrective regimen sliding scale   SubCutaneous at bedtime  insulin lispro (HumaLOG) corrective regimen sliding scale   SubCutaneous three times a day before meals  losartan 100 milliGRAM(s) Oral daily  metoprolol succinate ER 25 milliGRAM(s) Oral daily  pantoprazole    Tablet 40 milliGRAM(s) Oral before breakfast  polyethylene glycol 3350 17 Gram(s) Oral daily  sodium chloride 0.9%. 1000 milliLiter(s) (75 mL/Hr) IV Continuous <Continuous>    MEDICATIONS  (PRN):  acetaminophen   Tablet .. 650 milliGRAM(s) Oral every 6 hours PRN Temp greater or equal to 38C (100.4F), Mild Pain (1 - 3)  dextrose 40% Gel 15 Gram(s) Oral once PRN Blood Glucose LESS THAN 70 milliGRAM(s)/deciliter  glucagon  Injectable 1 milliGRAM(s) IntraMuscular once PRN Glucose LESS THAN 70 milligrams/deciliter  HYDROmorphone  Injectable 1 milliGRAM(s) IV Push every 4 hours PRN Severe Pain (7 - 10)  oxyCODONE    IR 10 milliGRAM(s) Oral every 4 hours PRN Moderate Pain (4 - 6)  oxyCODONE    IR 5 milliGRAM(s) Oral every 4 hours PRN Mild Pain (1 - 3) 03-Jul-2025 19:34